# Patient Record
Sex: FEMALE | Race: WHITE | NOT HISPANIC OR LATINO | Employment: FULL TIME | URBAN - METROPOLITAN AREA
[De-identification: names, ages, dates, MRNs, and addresses within clinical notes are randomized per-mention and may not be internally consistent; named-entity substitution may affect disease eponyms.]

---

## 2023-09-09 PROBLEM — R79.89 ABNORMAL TSH: Status: ACTIVE | Noted: 2023-09-09

## 2023-09-09 PROBLEM — Z91.199 NON-COMPLIANCE WITH TREATMENT: Status: ACTIVE | Noted: 2023-09-09

## 2023-09-09 PROBLEM — I44.2 COMPLETE AV BLOCK (MULTI): Status: ACTIVE | Noted: 2023-09-09

## 2023-09-09 PROBLEM — H35.342 LAMELLAR MACULAR HOLE OF LEFT EYE: Status: ACTIVE | Noted: 2023-09-09

## 2023-09-09 PROBLEM — Q14.1 CONGENITAL HYPERTROPHY OF RETINAL PIGMENT EPITHELIUM: Status: ACTIVE | Noted: 2023-09-09

## 2023-09-09 PROBLEM — M79.89 SWELLING OF BOTH LOWER EXTREMITIES: Status: ACTIVE | Noted: 2023-09-09

## 2023-09-09 PROBLEM — I83.90 VARICOSE VEIN OF LEG: Status: ACTIVE | Noted: 2023-09-09

## 2023-09-09 PROBLEM — E66.812 CLASS 2 OBESITY WITH BODY MASS INDEX (BMI) OF 38.0 TO 38.9 IN ADULT: Status: ACTIVE | Noted: 2023-09-09

## 2023-09-09 PROBLEM — E66.9 CLASS 2 OBESITY WITH BODY MASS INDEX (BMI) OF 38.0 TO 38.9 IN ADULT: Status: ACTIVE | Noted: 2023-09-09

## 2023-09-09 PROBLEM — I50.30 DIASTOLIC HEART FAILURE, STAGE C (MULTI): Status: ACTIVE | Noted: 2023-09-09

## 2023-09-09 PROBLEM — R53.83 FATIGUE: Status: ACTIVE | Noted: 2023-09-09

## 2023-09-09 PROBLEM — H25.813 COMBINED FORMS OF AGE-RELATED CATARACT OF BOTH EYES: Status: ACTIVE | Noted: 2023-09-09

## 2023-09-09 PROBLEM — Z95.0 PACEMAKER: Status: ACTIVE | Noted: 2023-09-09

## 2023-09-09 PROBLEM — E78.5 HLD (HYPERLIPIDEMIA): Status: ACTIVE | Noted: 2023-09-09

## 2023-09-09 PROBLEM — I10 HTN (HYPERTENSION), BENIGN: Status: ACTIVE | Noted: 2023-09-09

## 2023-09-09 PROBLEM — H35.61 RETINAL HEMORRHAGE NOTED ON EXAMINATION OF RIGHT EYE: Status: ACTIVE | Noted: 2023-09-09

## 2023-09-09 PROBLEM — D64.9 ANEMIA: Status: ACTIVE | Noted: 2023-09-09

## 2023-09-09 PROBLEM — I42.2 HYPERTROPHIC CARDIOMYOPATHY (MULTI): Status: ACTIVE | Noted: 2023-09-09

## 2023-09-09 RX ORDER — LYSINE HCL 500 MG
1 TABLET ORAL DAILY
COMMUNITY

## 2023-09-09 RX ORDER — FUROSEMIDE 20 MG/1
1 TABLET ORAL DAILY
COMMUNITY

## 2023-09-09 RX ORDER — SIMVASTATIN 20 MG/1
1 TABLET, FILM COATED ORAL DAILY
COMMUNITY
Start: 2022-10-06

## 2023-09-09 RX ORDER — CLONIDINE HYDROCHLORIDE 0.1 MG/1
0.1 TABLET ORAL 3 TIMES DAILY PRN
COMMUNITY
Start: 2022-09-03 | End: 2024-02-20 | Stop reason: ALTCHOICE

## 2023-09-09 RX ORDER — CARVEDILOL 25 MG/1
2 TABLET ORAL 2 TIMES DAILY
COMMUNITY

## 2023-09-09 RX ORDER — LISINOPRIL 20 MG/1
10 TABLET ORAL 2 TIMES DAILY
COMMUNITY
Start: 2022-09-07 | End: 2024-04-18 | Stop reason: ALTCHOICE

## 2023-10-04 RX ORDER — AMLODIPINE BESYLATE 5 MG/1
5 TABLET ORAL DAILY
COMMUNITY

## 2023-10-10 ENCOUNTER — PREP FOR PROCEDURE (OUTPATIENT)
Dept: OPHTHALMOLOGY | Facility: CLINIC | Age: 68
End: 2023-10-10
Payer: COMMERCIAL

## 2023-10-10 RX ORDER — CYCLOPENTOLATE HYDROCHLORIDE 10 MG/ML
1 SOLUTION/ DROPS OPHTHALMIC
Status: CANCELLED | OUTPATIENT
Start: 2023-10-10 | End: 2023-10-10

## 2023-10-10 RX ORDER — PHENYLEPHRINE HYDROCHLORIDE 25 MG/ML
1 SOLUTION/ DROPS OPHTHALMIC
Status: CANCELLED | OUTPATIENT
Start: 2023-10-10 | End: 2023-10-10

## 2023-10-10 RX ORDER — MOXIFLOXACIN 5 MG/ML
1 SOLUTION/ DROPS OPHTHALMIC ONCE
Status: CANCELLED | OUTPATIENT
Start: 2023-10-10 | End: 2023-10-10

## 2023-10-10 RX ORDER — TROPICAMIDE 10 MG/ML
1 SOLUTION/ DROPS OPHTHALMIC
Status: CANCELLED | OUTPATIENT
Start: 2023-10-10 | End: 2023-10-10

## 2023-10-10 RX ORDER — TETRACAINE HYDROCHLORIDE 5 MG/ML
1 SOLUTION OPHTHALMIC ONCE
Status: CANCELLED | OUTPATIENT
Start: 2023-10-10 | End: 2023-10-10

## 2023-10-11 ENCOUNTER — ANESTHESIA EVENT (OUTPATIENT)
Dept: OPERATING ROOM | Facility: CLINIC | Age: 68
End: 2023-10-11
Payer: COMMERCIAL

## 2023-10-11 ENCOUNTER — HOSPITAL ENCOUNTER (OUTPATIENT)
Facility: CLINIC | Age: 68
Setting detail: OUTPATIENT SURGERY
Discharge: HOME | End: 2023-10-11
Attending: OPHTHALMOLOGY | Admitting: OPHTHALMOLOGY
Payer: COMMERCIAL

## 2023-10-11 ENCOUNTER — ANESTHESIA (OUTPATIENT)
Dept: OPERATING ROOM | Facility: CLINIC | Age: 68
End: 2023-10-11
Payer: COMMERCIAL

## 2023-10-11 VITALS
TEMPERATURE: 97.9 F | SYSTOLIC BLOOD PRESSURE: 156 MMHG | RESPIRATION RATE: 16 BRPM | DIASTOLIC BLOOD PRESSURE: 71 MMHG | BODY MASS INDEX: 40.55 KG/M2 | WEIGHT: 243.39 LBS | HEART RATE: 60 BPM | HEIGHT: 65 IN | OXYGEN SATURATION: 98 %

## 2023-10-11 DIAGNOSIS — Z98.41 STATUS POST RIGHT CATARACT EXTRACTION: Primary | ICD-10-CM

## 2023-10-11 PROCEDURE — 2500000005 HC RX 250 GENERAL PHARMACY W/O HCPCS: Performed by: OPHTHALMOLOGY

## 2023-10-11 PROCEDURE — 3700000001 HC GENERAL ANESTHESIA TIME - INITIAL BASE CHARGE: Performed by: OPHTHALMOLOGY

## 2023-10-11 PROCEDURE — 3700000002 HC GENERAL ANESTHESIA TIME - EACH INCREMENTAL 1 MINUTE: Performed by: OPHTHALMOLOGY

## 2023-10-11 PROCEDURE — 2500000004 HC RX 250 GENERAL PHARMACY W/ HCPCS (ALT 636 FOR OP/ED)

## 2023-10-11 PROCEDURE — A66984 PR REMV CATARACT EXTRACAP,INSERT LENS: Performed by: ANESTHESIOLOGY

## 2023-10-11 PROCEDURE — 66984 XCAPSL CTRC RMVL W/O ECP: CPT | Performed by: OPHTHALMOLOGY

## 2023-10-11 PROCEDURE — 3600000008 HC OR TIME - EACH INCREMENTAL 1 MINUTE - PROCEDURE LEVEL THREE: Performed by: OPHTHALMOLOGY

## 2023-10-11 PROCEDURE — 2500000001 HC RX 250 WO HCPCS SELF ADMINISTERED DRUGS (ALT 637 FOR MEDICARE OP): Performed by: OPHTHALMOLOGY

## 2023-10-11 PROCEDURE — 7100000009 HC PHASE TWO TIME - INITIAL BASE CHARGE: Performed by: OPHTHALMOLOGY

## 2023-10-11 PROCEDURE — 2500000001 HC RX 250 WO HCPCS SELF ADMINISTERED DRUGS (ALT 637 FOR MEDICARE OP)

## 2023-10-11 PROCEDURE — 2500000004 HC RX 250 GENERAL PHARMACY W/ HCPCS (ALT 636 FOR OP/ED): Performed by: OPHTHALMOLOGY

## 2023-10-11 PROCEDURE — 2500000001 HC RX 250 WO HCPCS SELF ADMINISTERED DRUGS (ALT 637 FOR MEDICARE OP): Performed by: STUDENT IN AN ORGANIZED HEALTH CARE EDUCATION/TRAINING PROGRAM

## 2023-10-11 PROCEDURE — A66984 PR REMV CATARACT EXTRACAP,INSERT LENS

## 2023-10-11 PROCEDURE — 2760000001 HC OR 276 NO HCPCS: Performed by: OPHTHALMOLOGY

## 2023-10-11 PROCEDURE — 2720000007 HC OR 272 NO HCPCS: Performed by: OPHTHALMOLOGY

## 2023-10-11 PROCEDURE — 3600000003 HC OR TIME - INITIAL BASE CHARGE - PROCEDURE LEVEL THREE: Performed by: OPHTHALMOLOGY

## 2023-10-11 PROCEDURE — 2580000001 HC RX 258 IV SOLUTIONS

## 2023-10-11 PROCEDURE — 7100000010 HC PHASE TWO TIME - EACH INCREMENTAL 1 MINUTE: Performed by: OPHTHALMOLOGY

## 2023-10-11 RX ORDER — MOXIFLOXACIN 5 MG/ML
1 SOLUTION/ DROPS OPHTHALMIC ONCE
Status: COMPLETED | OUTPATIENT
Start: 2023-10-11 | End: 2023-10-11

## 2023-10-11 RX ORDER — TROPICAMIDE 10 MG/ML
1 SOLUTION/ DROPS OPHTHALMIC
Status: COMPLETED | OUTPATIENT
Start: 2023-10-11 | End: 2023-10-11

## 2023-10-11 RX ORDER — PREDNISOLONE ACETATE 10 MG/ML
1 SUSPENSION/ DROPS OPHTHALMIC 4 TIMES DAILY
Qty: 10 ML | Refills: 0 | Status: ON HOLD | OUTPATIENT
Start: 2023-10-11 | End: 2023-11-08 | Stop reason: SDUPTHER

## 2023-10-11 RX ORDER — TRIAMCINOLONE ACETONIDE 40 MG/ML
INJECTION, SUSPENSION INTRA-ARTICULAR; INTRAMUSCULAR AS NEEDED
Status: DISCONTINUED | OUTPATIENT
Start: 2023-10-11 | End: 2023-10-11 | Stop reason: HOSPADM

## 2023-10-11 RX ORDER — TETRACAINE HYDROCHLORIDE 5 MG/ML
1 SOLUTION OPHTHALMIC ONCE
Status: COMPLETED | OUTPATIENT
Start: 2023-10-11 | End: 2023-10-11

## 2023-10-11 RX ORDER — ACETAMINOPHEN 325 MG/1
TABLET ORAL AS NEEDED
Status: DISCONTINUED | OUTPATIENT
Start: 2023-10-11 | End: 2023-10-11

## 2023-10-11 RX ORDER — MOXIFLOXACIN 5 MG/ML
SOLUTION/ DROPS OPHTHALMIC AS NEEDED
Status: DISCONTINUED | OUTPATIENT
Start: 2023-10-11 | End: 2023-10-11 | Stop reason: HOSPADM

## 2023-10-11 RX ORDER — CYCLOPENTOLATE HYDROCHLORIDE 10 MG/ML
1 SOLUTION/ DROPS OPHTHALMIC
Status: COMPLETED | OUTPATIENT
Start: 2023-10-11 | End: 2023-10-11

## 2023-10-11 RX ORDER — MIDAZOLAM HYDROCHLORIDE 1 MG/ML
INJECTION, SOLUTION INTRAMUSCULAR; INTRAVENOUS AS NEEDED
Status: DISCONTINUED | OUTPATIENT
Start: 2023-10-11 | End: 2023-10-11

## 2023-10-11 RX ORDER — PHENYLEPHRINE HYDROCHLORIDE 25 MG/ML
1 SOLUTION/ DROPS OPHTHALMIC
Status: COMPLETED | OUTPATIENT
Start: 2023-10-11 | End: 2023-10-11

## 2023-10-11 RX ORDER — LIDOCAINE HYDROCHLORIDE 10 MG/ML
INJECTION INFILTRATION; PERINEURAL AS NEEDED
Status: DISCONTINUED | OUTPATIENT
Start: 2023-10-11 | End: 2023-10-11 | Stop reason: HOSPADM

## 2023-10-11 RX ADMIN — PHENYLEPHRINE HYDROCHLORIDE 1 DROP: 25 SOLUTION/ DROPS OPHTHALMIC at 13:10

## 2023-10-11 RX ADMIN — TROPICAMIDE 1 DROP: 10 SOLUTION/ DROPS OPHTHALMIC at 13:00

## 2023-10-11 RX ADMIN — PHENYLEPHRINE HYDROCHLORIDE 1 DROP: 25 SOLUTION/ DROPS OPHTHALMIC at 13:00

## 2023-10-11 RX ADMIN — TROPICAMIDE 1 DROP: 10 SOLUTION/ DROPS OPHTHALMIC at 13:05

## 2023-10-11 RX ADMIN — CYCLOPENTOLATE HYDROCHLORIDE 1 DROP: 10 SOLUTION/ DROPS OPHTHALMIC at 13:05

## 2023-10-11 RX ADMIN — MIDAZOLAM 2 MG: 1 INJECTION INTRAMUSCULAR; INTRAVENOUS at 13:54

## 2023-10-11 RX ADMIN — PHENYLEPHRINE HYDROCHLORIDE 1 DROP: 25 SOLUTION/ DROPS OPHTHALMIC at 13:05

## 2023-10-11 RX ADMIN — SODIUM CHLORIDE, SODIUM LACTATE, POTASSIUM CHLORIDE, AND CALCIUM CHLORIDE: .6; .31; .03; .02 INJECTION, SOLUTION INTRAVENOUS at 13:57

## 2023-10-11 RX ADMIN — TROPICAMIDE 1 DROP: 10 SOLUTION/ DROPS OPHTHALMIC at 13:10

## 2023-10-11 RX ADMIN — TETRACAINE HYDROCHLORIDE 1 DROP: 5 SOLUTION/ DROPS OPHTHALMIC at 13:00

## 2023-10-11 RX ADMIN — MOXIFLOXACIN 1 DROP: 5 SOLUTION/ DROPS OPHTHALMIC at 13:00

## 2023-10-11 RX ADMIN — ACETAMINOPHEN 650 MG: 325 TABLET ORAL at 13:48

## 2023-10-11 RX ADMIN — CYCLOPENTOLATE HYDROCHLORIDE 1 DROP: 10 SOLUTION/ DROPS OPHTHALMIC at 13:00

## 2023-10-11 RX ADMIN — CYCLOPENTOLATE HYDROCHLORIDE 1 DROP: 10 SOLUTION/ DROPS OPHTHALMIC at 13:10

## 2023-10-11 ASSESSMENT — PAIN - FUNCTIONAL ASSESSMENT
PAIN_FUNCTIONAL_ASSESSMENT: 0-10

## 2023-10-11 ASSESSMENT — PAIN SCALES - GENERAL
PAINLEVEL_OUTOF10: 0 - NO PAIN
PAIN_LEVEL: 0

## 2023-10-11 NOTE — BRIEF OP NOTE
Date: 10/11/2023  OR Location: [facility] OR    Name: Jacquelyn Buenrostro, : 1955, Age: 67 y.o., MRN: [id number], Sex: female    Diagnosis  Pre-op Diagnosis     * Combined forms of age-related cataract, right eye [H25.811] Post-op Diagnosis     * Combined forms of age-related cataract, right eye [H25.811]     Procedures  PHACO OD  58884 - MT XCAPSL CTRC RMVL INSJ IO LENS PROSTH W/O ECP      Surgeons      * Haily Amaro - Primary    Resident/Fellow/Other Assistant:  No surgical staff documented.    Procedure Summary  Anesthesia: Monitor Anesthesia Care  ASA: III  Anesthesia Staff: Anesthesiologist: Wilfredo Kwon MD  C-AA: ANGELA Youngblood  Estimated Blood Loss: 0mL  Intra-op Medications:   Medication Name Total Dose   balanced salts (BSS) intraocular solution 500 mL   lidocaine (Xylocaine) 10 mg/mL (1 %) injection 1 mL   moxifloxacin (Vigamox) 0.5 % ophthalmic solution 1 drop   chondroitin sulf-sod hyaluron (Viscoat) intraocular injection 1 mL   triamcinolone acetonide (Kenalog-40) injection 5 mg              Anesthesia Record               Intraprocedure I/O Totals          Intake    .00 mL    Total Intake 200 mL          Specimen: No specimens collected     Staff:   Circulator: Belkis Mantilla RN; Kimberlee Gaspar RN  Scrub Person: Emerson Cordova          Findings: Cataract right eye (OD)     Complications:  None; patient tolerated the procedure well.     Disposition: PACU - hemodynamically stable.  Condition: stable  Specimens Collected: No specimens collected  Attending Attestation: I performed the procedure.    Haily Amaro  Phone Number: [phone]

## 2023-10-11 NOTE — OP NOTE
PHACO OD (R) Operative Note     Date: 10/11/2023  OR Location: [facility] OR    Name: Jacquelyn Buenrostro, : 1955, Age: 67 y.o., MRN: [id number], Sex: female    Diagnosis  Pre-op Diagnosis     * Combined forms of age-related cataract, right eye [H25.811] Post-op Diagnosis     * Combined forms of age-related cataract, right eye [H25.811]     Procedures  PHACO OD  72413 - [place] XCAPSL CTRC RMVL INSJ IO LENS PROSTH W/O ECP      Surgeons      * Haily Amaro - Primary    Resident/Fellow/Other Assistant:  No surgical staff documented.    Procedure Summary  Anesthesia: Monitor Anesthesia Care  ASA: III  Anesthesia Staff: Anesthesiologist: Wilfredo Kwon MD  C-AA: ANGELA Youngblood  Estimated Blood Loss: 0mL  Intra-op Medications:   Medication Name Total Dose   balanced salts (BSS) intraocular solution 500 mL   lidocaine (Xylocaine) 10 mg/mL (1 %) injection 1 mL   moxifloxacin (Vigamox) 0.5 % ophthalmic solution 1 drop   chondroitin sulf-sod hyaluron (Viscoat) intraocular injection 1 mL   triamcinolone acetonide (Kenalog-40) injection 5 mg              Anesthesia Record               Intraprocedure I/O Totals          Intake    .00 mL    Total Intake 200 mL          Specimen: No specimens collected     Staff:   Circulator: Belkis Mantilla RN; Kimberlee Gaspar RN  Scrub Person: Emerson Corodva         Drains and/or Catheters: * None in log *    Tourniquet Times:         Implants:  Implants       Type Name Action Serial No.       17.5 DIOPTER, TECNIS EYHANCE 1-PIECE IOL W/ SIMPLICITY DELIVERY SYSTEM, BICONVEX, UV-BLOCKING HYDROPHOBIC ACRYLIC, MODEL DIB00 Implanted [id number]              Findings: Cataract OD    Indications: Jacquelyn Buenrostro is an 67 y.o. female who is having surgery for Combined forms of age-related cataract, right eye [H25.811].     The patient was seen in the preoperative area. The risks, benefits, complications, treatment options, non-operative alternatives, expected recovery and outcomes were  discussed with the patient. The possibilities of reaction to medication, pulmonary aspiration, injury to surrounding structures, bleeding, recurrent infection, the need for additional procedures, failure to diagnose a condition, and creating a complication requiring transfusion or operation were discussed with the patient. The patient concurred with the proposed plan, giving informed consent.  The site of surgery was properly noted/marked if necessary per policy. The patient has been actively warmed in preoperative area. Preoperative antibiotics have been ordered and given within 1 hours of incision. Venous thrombosis prophylaxis are not indicated.    Procedure Details: The patient was placed in the supine position on the operating room table where appropriate blood pressure and cardiac monitoring were initiated. The patient was prepped and draped in the usual sterile fashion for intraocular surgery. This included instillation of Betadine 5% onto the ocular surface followed by irrigation with balance salt solution a minute or two later. A lid speculum was placed and the operating microscope was positioned. One paracentesis stab incision was made to the left of the planned cataract incision with a 15-degree supersharp blade. 1 ml of preservative free lidocaine was injected into the AC. Viscoat was used to replace the aqueous humor. A temporal clear corneal wound was fashioned beginning at the limbus with a 2.2 mm keratome, extending 2 mm into clear cornea before entering the anterior chamber. A continuous tear circular capsulorhexis of approximately 5 mm in diameter was performed. Hydrodissection was performed using a Cross canula. The endothelium was coated with viscoat again. Using the Ozil handpiece on the [employer] O[employer]ia Lens Removal System, the nucleus was emulsified and aspirated using a divide-and-conquer technique. Residual cortex was removed from the eye with the irrigation/aspiration bimanually. ProVisc was used  to inflate the capsular bag. The lens implant was inspected and found to be free of visible defects. The lens used was model DCB00, power 17.5 diopter intraocular lens. The lens was inserted into the capsular bag. The lens was centered with a Rogel hook. Residual viscoelastic was removed from the eye with the irrigation/aspiration instrument. Preservative free moxifloxacin was injected  intracameral. The wounds were checked and found to be watertight. 0.3ml of Diluted (1:3) triamcinolone acetonide was injected subonj inferiorly. The lid speculum was removed and the eye was dressed with Maxitrol ointment, eye pad, tape, and shield. The patient tolerated the procedure well, and there were no complications.   Complications:  None; patient tolerated the procedure well.    Disposition: PACU - hemodynamically stable.  Condition: stable         Additional Details: None    Attending Attestation:     Haily Amaro  Phone Number: [phone]

## 2023-10-11 NOTE — DISCHARGE INSTRUCTIONS
Start the following eye drops in the operative eye:     Prednisolone acetate (pink or white cap) - 4 times a day       No heavy lifting over 10-15 lbs, no bending over, do not get eye wet, no eye rubbing. Wear eye shield until your next appointment; only remove temporarily to apply drops. Please call immediately if you develop any redness, pain, decreased vision, flashes, floaters.   Office phone (after hours): [phone]       Hospital : [phone] (call this number if unable to reach someone on the phone above) then ask for ophthalmologist on call.     Patient to resume home medications.

## 2023-10-11 NOTE — H&P
History Of Present Illness  Jacquelyn Buenrostro is a 67 y.o. female presenting with visually significant cataract of the right eye.     Past Medical History  Past Medical History:   Diagnosis Date    Arthritis     Encounter for follow-up examination after completed treatment for conditions other than malignant neoplasm 09/07/2022    Hospital discharge follow-up    Hyperlipidemia     Hypertension     Morbid (severe) obesity due to excess calories (CMS/[facility]) 08/30/2022    Morbid obesity with BMI of 40.0-44.9, adult    Other conditions influencing health status 08/30/2022    History of cough    Pacemaker     Personal history of other specified conditions 08/30/2022    History of paroxysmal nocturnal dyspnea    Unspecified open wound, unspecified ankle, initial encounter 08/30/2022    Wound of ankle       Surgical History  Past Surgical History:   Procedure Laterality Date    CARDIAC CATHETERIZATION      INSERT / REPLACE / REMOVE PACEMAKER      OTHER SURGICAL HISTORY  09/07/2022    Pacemaker insertion        Social History  She reports that she has never smoked. She has never been exposed to tobacco smoke. She has never used smokeless tobacco. She reports that she does not drink alcohol and does not use drugs.    Family History  Family History   Problem Relation Name Age of Onset    Cancer Mother      Cancer Other Multiple Family Member         Allergies  Aspirin    Review of Systems   All other systems reviewed and are negative.       Physical Exam  Vitals reviewed.   Constitutional:       Appearance: Normal appearance.   HENT:      Head: Normocephalic and atraumatic.      Mouth/Throat:      Pharynx: Oropharynx is clear.   Eyes:      Conjunctiva/sclera: Conjunctivae normal.   Abdominal:      General: Abdomen is flat.   Musculoskeletal:         General: Normal range of motion.      Cervical back: Normal range of motion.   Skin:     General: Skin is warm.   Neurological:      General: No focal deficit present.      Mental  "Status: She is alert and oriented to person, place, and time.   Psychiatric:         Mood and Affect: Mood normal.         Behavior: Behavior normal.          Last Recorded Vitals  Blood pressure 178/90, pulse 86, temperature 36 °C (96.8 °F), temperature source Temporal, resp. rate 16, height 1.651 m (5' 5\"), weight 110 kg (243 lb 6.2 oz), SpO2 97 %.    Relevant Results             Assessment/Plan   Active Problems:  There are no active Hospital Problems.      #Visually significant cataract of the right eye   -Plan for cataract extraction and intraocular lens (IOL) implantation of right eye             Dennis Ahmadi MD    "

## 2023-10-11 NOTE — ANESTHESIA PREPROCEDURE EVALUATION
Patient: Jacquelyn Buenrostro    Procedure Information       Anesthesia Start Date/Time: 10/11/23 1349    Procedure: PHACO OD (Right: Eye)    Location: [facility] 02 / [facility]    Surgeons: Haily Amaro MD            Relevant Problems   Cardiovascular   (+) Complete AV block (CMS/HCC)   (+) Diastolic heart failure, stage C (CMS/HCC)   (+) HLD (hyperlipidemia)   (+) HTN (hypertension), benign   (+) Pacemaker      Endocrine   (+) Class 2 obesity with body mass index (BMI) of 38.0 to 38.9 in adult      Hematology   (+) Anemia       Clinical information reviewed:   Tobacco  Allergies  Meds   Med Hx  Surg Hx  OB Status  Fam Hx  Soc   Hx        NPO Detail:  NPO/Void Status  Date of Last Liquid: 10/10/23  Time of Last Liquid: 2100  Date of Last Solid: 10/10/23  Time of Last Solid: 2100  Time of Last Void: 1100         Physical Exam    Airway  Mallampati: III     Cardiovascular   Rhythm: regular  Comments: Hx of pacemaker, AV block   Dental    Pulmonary - normal exam     Abdominal        Anesthesia Plan    ASA 3     MAC     Anesthetic plan and risks discussed with patient.

## 2023-10-11 NOTE — ANESTHESIA POSTPROCEDURE EVALUATION
Patient: Jacquelyn Buenrostro    Procedure Summary       Date: 10/11/23 Room / Location: [facility] OR 02 / [facility]    Anesthesia Start: 1349 Anesthesia Stop: 1417    Procedure: PHACO OD (Right: Eye) Diagnosis:       Combined forms of age-related cataract, right eye      (Combined forms of age-related cataract, right eye [H25.811])    Surgeons: Haily Amaro MD Responsible Provider: Wilfredo Kwno MD    Anesthesia Type: MAC ASA Status: 3            Anesthesia Type: MAC    Vitals Value Taken Time   /72 10/11/23 1417   Temp 36.6 °C (97.9 °F) 10/11/23 1417   Pulse 60 10/11/23 1417   Resp 16 10/11/23 1417   SpO2 97 % 10/11/23 1417       Anesthesia Post Evaluation    Patient location during evaluation: bedside  Patient participation: complete - patient participated  Level of consciousness: awake and alert  Pain score: 0  Pain management: adequate  Airway patency: patent  Cardiovascular status: acceptable  Respiratory status: acceptable  Hydration status: acceptable    No notable events documented.

## 2023-10-12 ENCOUNTER — OFFICE VISIT (OUTPATIENT)
Dept: OPHTHALMOLOGY | Facility: CLINIC | Age: 68
End: 2023-10-12
Payer: COMMERCIAL

## 2023-10-12 DIAGNOSIS — Z96.1 PSEUDOPHAKIA, RIGHT EYE: Primary | ICD-10-CM

## 2023-10-12 PROCEDURE — 99024 POSTOP FOLLOW-UP VISIT: CPT | Performed by: OPHTHALMOLOGY

## 2023-10-12 ASSESSMENT — ENCOUNTER SYMPTOMS
ALLERGIC/IMMUNOLOGIC NEGATIVE: 0
CARDIOVASCULAR NEGATIVE: 0
PSYCHIATRIC NEGATIVE: 0
RESPIRATORY NEGATIVE: 0
HEMATOLOGIC/LYMPHATIC NEGATIVE: 0
ENDOCRINE NEGATIVE: 0
NEUROLOGICAL NEGATIVE: 0
GASTROINTESTINAL NEGATIVE: 0
EYES NEGATIVE: 1
MUSCULOSKELETAL NEGATIVE: 0
CONSTITUTIONAL NEGATIVE: 0

## 2023-10-12 ASSESSMENT — PAIN SCALES - GENERAL: PAINLEVEL_OUTOF10: 0 - NO PAIN

## 2023-10-12 ASSESSMENT — EXTERNAL EXAM - LEFT EYE: OS_EXAM: NORMAL

## 2023-10-12 ASSESSMENT — TONOMETRY
OD_IOP_MMHG: 21
IOP_METHOD: GOLDMANN APPLANATION

## 2023-10-12 ASSESSMENT — EXTERNAL EXAM - RIGHT EYE: OD_EXAM: NORMAL

## 2023-10-12 ASSESSMENT — SLIT LAMP EXAM - LIDS
COMMENTS: NORMAL
COMMENTS: NORMAL

## 2023-10-12 ASSESSMENT — VISUAL ACUITY
METHOD: SNELLEN - LINEAR
OD_SC: 20/30

## 2023-10-12 NOTE — PATIENT INSTRUCTIONS
Postop instructions   Prednisolone acetate drops:  4 times/day for 1 week  3 times/day for 1 week  2 times/day for 1 week  Once/day for 1 week      Sleep with shield at night for 7 days  No eye rubbing  May shower and wash her face but no water inside surgery eye  No lifting any weight above 10lbs

## 2023-10-13 ENCOUNTER — OFFICE VISIT (OUTPATIENT)
Dept: CARDIOLOGY | Facility: CLINIC | Age: 68
End: 2023-10-13
Payer: COMMERCIAL

## 2023-10-13 VITALS
BODY MASS INDEX: 40.32 KG/M2 | HEIGHT: 65 IN | HEART RATE: 85 BPM | DIASTOLIC BLOOD PRESSURE: 78 MMHG | WEIGHT: 242 LBS | SYSTOLIC BLOOD PRESSURE: 123 MMHG

## 2023-10-13 DIAGNOSIS — I50.30 DIASTOLIC HEART FAILURE, STAGE C (MULTI): Primary | ICD-10-CM

## 2023-10-13 PROCEDURE — 99213 OFFICE O/P EST LOW 20 MIN: CPT | Performed by: NURSE PRACTITIONER

## 2023-10-13 PROCEDURE — 3078F DIAST BP <80 MM HG: CPT | Performed by: NURSE PRACTITIONER

## 2023-10-13 PROCEDURE — 3074F SYST BP LT 130 MM HG: CPT | Performed by: NURSE PRACTITIONER

## 2023-10-13 PROCEDURE — 1036F TOBACCO NON-USER: CPT | Performed by: NURSE PRACTITIONER

## 2023-10-13 PROCEDURE — 1160F RVW MEDS BY RX/DR IN RCRD: CPT | Performed by: NURSE PRACTITIONER

## 2023-10-13 PROCEDURE — 1126F AMNT PAIN NOTED NONE PRSNT: CPT | Performed by: NURSE PRACTITIONER

## 2023-10-13 PROCEDURE — 1159F MED LIST DOCD IN RCRD: CPT | Performed by: NURSE PRACTITIONER

## 2023-10-13 ASSESSMENT — ENCOUNTER SYMPTOMS
DIZZINESS: 0
OCCASIONAL FEELINGS OF UNSTEADINESS: 0
SYNCOPE: 0
DECREASED APPETITE: 0
VOMITING: 0
WEIGHT GAIN: 0
LOSS OF SENSATION IN FEET: 0
DYSPNEA ON EXERTION: 0
PALPITATIONS: 0
IRREGULAR HEARTBEAT: 0
NEAR-SYNCOPE: 0
WEIGHT LOSS: 0
NAUSEA: 0
LIGHT-HEADEDNESS: 0
DIARRHEA: 0
PND: 0
SHORTNESS OF BREATH: 0
SLEEP DISTURBANCES DUE TO BREATHING: 0
DEPRESSION: 1
ORTHOPNEA: 0

## 2023-10-13 NOTE — PROGRESS NOTES
"Chief Complaint:  66yo patient of Dr. Geiger here for 3 month follow up for heart failure.     HPI  Most recent follow up 23, referred for genetic counseling and PET scan. Patient declined during visit. Start Amlodipine 5mg every day.     Interval Hx: FUV with Dr. Mccann, no changes. Recommended PET scan. Recent right eye cataract surgery.     PMHx: HTN, HFpEF, High Grade AV Block s/p PPM (22)  PSHx: Denies.   Social Hx: Never smoker. Denies EtOH/illicit drug use.   Family Hx: Mother- CHF,  @ 92.     Denies chest pain. Denies palpitations. Denies SOB on exertion. Able to climb 14 stairs w/o difficulty. Denies orthopnea/PND. Denies lightheadedness, dizziness, and syncope. Denies edema. Medication adherent. Eating/drinking well. Denies N/V/D. Home BP monitoring 140-150/80-90s. HR 80s. Only takes Clonidine once daily.     Review of Systems   Constitutional: Negative for decreased appetite, weight gain and weight loss.   Cardiovascular:  Negative for chest pain, dyspnea on exertion, irregular heartbeat, leg swelling, near-syncope, orthopnea, palpitations, paroxysmal nocturnal dyspnea and syncope.   Respiratory:  Negative for shortness of breath and sleep disturbances due to breathing.    Gastrointestinal:  Negative for diarrhea, nausea and vomiting.   Neurological:  Negative for dizziness and light-headedness.     Visit Vitals  /78 (BP Location: Right arm, Patient Position: Sitting)   Pulse 85   Ht 1.651 m (5' 5\")   Wt 110 kg (242 lb)   BMI 40.27 kg/m²   OB Status Postmenopausal   Smoking Status Never   BSA 2.25 m²     Objective   Vitals reviewed.   Constitutional:       Appearance: Healthy appearance.   Neck:      Vascular: No hepatojugular reflux or JVD. JVD normal.   Pulmonary:      Effort: Pulmonary effort is normal.      Breath sounds: Normal breath sounds.   Cardiovascular:      Normal rate. Regular rhythm.      Murmurs: There is no murmur.      No gallop.  No click. No rub.      Comments: " Bilateral LE w/varicosities   Edema:     Peripheral edema absent.   Abdominal:      General: There is no distension.   Skin:     General: Skin is warm and dry.   Neurological:      Mental Status: Alert and oriented to person, place and time.       Lab Review:   Lab Results   Component Value Date     01/10/2023    K 4.1 01/10/2023     01/10/2023    CO2 25 01/10/2023    BUN 24 (H) 01/10/2023    CREATININE 1.14 (H) 01/10/2023    GLUCOSE 145 (H) 01/10/2023    CALCIUM 9.2 01/10/2023       Current Outpatient Medications:     amLODIPine (Norvasc) 5 mg tablet, Take 1 tablet (5 mg) by mouth once daily., Disp: , Rfl:     carvedilol (Coreg) 25 mg tablet, Take 2 tablets (50 mg) by mouth 2 times a day., Disp: , Rfl:     cloNIDine (Catapres) 0.1 mg tablet, Take 1 tablet (0.1 mg) by mouth 3 times a day as needed (systolic blood pressure greater than 170)., Disp: , Rfl:     furosemide (Lasix) 20 mg tablet, Take 1 tablet (20 mg) by mouth once daily., Disp: , Rfl:     lisinopril 20 mg tablet, Take 0.5 tablets (10 mg) by mouth 2 times a day., Disp: , Rfl:     multivitamin-min-FA-ginkgo (One Daily Women 50 Plus) 400-120 mcg-mg tablet, Take 1 tablet by mouth once daily., Disp: , Rfl:     prednisoLONE acetate (Pred-Forte) 1 % ophthalmic suspension, Administer 1 drop into the right eye 4 times a day., Disp: 10 mL, Rfl: 0    simvastatin (Zocor) 20 mg tablet, Take 1 tablet (20 mg) by mouth once daily., Disp: , Rfl:     vit C/E/zinc/lutein/zeaxanthin (OCUVITE EYE HEALTH ORAL), Take 1 tablet by mouth once daily., Disp: , Rfl:     vitamins A,C,E-zinc-copper 4,296 mcg-226 mg-90 mg capsule, Take 1 capsule by mouth once daily., Disp: , Rfl:     Assessment/Plan   Chronic Diastolic Heart Failure  Asymptomatic from a cardiovascular standpoint. Appears euvolemic and normotensive on exam. Most recent TTE 6/22/23 LVEF 65-70% w/impaired relaxation pattern of LV diastolic filling. Mild MR. cMRI 2/14/23 LVEF 43% w/diffuse hyperenhancement of  basal anterior septum. Focal confluent hyperenhancement of the mid anterior ventricular junction 1.3cm. Findings c/w HCM. Asymmetric septal LVH (1.9cm). BNP 8/30/22 502. NYHA Class I Stage C HFpEF. Continue Furosemide 20mg QD. Agreeable to referral for PET scan to evaluated for sarcoidosis.     HTN  Well controlled today. Continue Lisinopril 10mg BID, Amlodipine 5mg every day, Carvedilol 50mg BID, and Clonidine 0.1mg TID prn.

## 2023-10-13 NOTE — PATIENT INSTRUCTIONS
Call [phone] to schedule PET scan. We will call you with any abnormal results.   Continue all current medications as prescribed.   Call [phone] to schedule 3 month follow up with Dr. Geiger.

## 2023-10-19 ENCOUNTER — OFFICE VISIT (OUTPATIENT)
Dept: OPHTHALMOLOGY | Facility: CLINIC | Age: 68
End: 2023-10-19
Payer: COMMERCIAL

## 2023-10-19 DIAGNOSIS — H25.813 COMBINED FORMS OF AGE-RELATED CATARACT OF BOTH EYES: Primary | ICD-10-CM

## 2023-10-19 PROCEDURE — 99024 POSTOP FOLLOW-UP VISIT: CPT | Performed by: OPHTHALMOLOGY

## 2023-10-19 ASSESSMENT — TONOMETRY
OD_IOP_MMHG: 18
IOP_METHOD: GOLDMANN APPLANATION

## 2023-10-19 ASSESSMENT — ENCOUNTER SYMPTOMS
PSYCHIATRIC NEGATIVE: 0
HEMATOLOGIC/LYMPHATIC NEGATIVE: 0
CONSTITUTIONAL NEGATIVE: 0
MUSCULOSKELETAL NEGATIVE: 0
NEUROLOGICAL NEGATIVE: 0
RESPIRATORY NEGATIVE: 0
EYES NEGATIVE: 0
GASTROINTESTINAL NEGATIVE: 0
CARDIOVASCULAR NEGATIVE: 0
ALLERGIC/IMMUNOLOGIC NEGATIVE: 0
ENDOCRINE NEGATIVE: 0

## 2023-10-19 ASSESSMENT — EXTERNAL EXAM - LEFT EYE: OS_EXAM: NORMAL

## 2023-10-19 ASSESSMENT — VISUAL ACUITY
OD_SC: 20/20
METHOD: SNELLEN - LINEAR

## 2023-10-19 ASSESSMENT — SLIT LAMP EXAM - LIDS
COMMENTS: NORMAL
COMMENTS: NORMAL

## 2023-10-19 ASSESSMENT — EXTERNAL EXAM - RIGHT EYE: OD_EXAM: NORMAL

## 2023-10-19 NOTE — PROGRESS NOTES
POW 1 s/p phaco OD  PO gtts taper as instructed  ER precautions   PO instructions  F/u 1 month for autoref and mrx

## 2023-10-24 ENCOUNTER — CLINICAL SUPPORT (OUTPATIENT)
Dept: CARDIOLOGY | Facility: CLINIC | Age: 68
End: 2023-10-24
Payer: COMMERCIAL

## 2023-10-24 ENCOUNTER — OFFICE VISIT (OUTPATIENT)
Dept: CARDIOLOGY | Facility: CLINIC | Age: 68
End: 2023-10-24
Payer: COMMERCIAL

## 2023-10-24 VITALS
HEIGHT: 65 IN | SYSTOLIC BLOOD PRESSURE: 140 MMHG | DIASTOLIC BLOOD PRESSURE: 82 MMHG | WEIGHT: 241 LBS | HEART RATE: 71 BPM | BODY MASS INDEX: 40.15 KG/M2

## 2023-10-24 DIAGNOSIS — Z95.0 PACEMAKER: ICD-10-CM

## 2023-10-24 DIAGNOSIS — I44.2 ATRIOVENTRICULAR BLOCK, COMPLETE (MULTI): ICD-10-CM

## 2023-10-24 DIAGNOSIS — Z95.0 CARDIAC PACEMAKER IN SITU: ICD-10-CM

## 2023-10-24 DIAGNOSIS — I44.2 COMPLETE AV BLOCK (MULTI): Primary | ICD-10-CM

## 2023-10-24 PROCEDURE — 3077F SYST BP >= 140 MM HG: CPT | Performed by: INTERNAL MEDICINE

## 2023-10-24 PROCEDURE — 93280 PM DEVICE PROGR EVAL DUAL: CPT | Performed by: INTERNAL MEDICINE

## 2023-10-24 PROCEDURE — 99214 OFFICE O/P EST MOD 30 MIN: CPT | Performed by: INTERNAL MEDICINE

## 2023-10-24 PROCEDURE — 1126F AMNT PAIN NOTED NONE PRSNT: CPT | Performed by: INTERNAL MEDICINE

## 2023-10-24 PROCEDURE — 1160F RVW MEDS BY RX/DR IN RCRD: CPT | Performed by: INTERNAL MEDICINE

## 2023-10-24 PROCEDURE — 1036F TOBACCO NON-USER: CPT | Performed by: INTERNAL MEDICINE

## 2023-10-24 PROCEDURE — 1159F MED LIST DOCD IN RCRD: CPT | Performed by: INTERNAL MEDICINE

## 2023-10-24 PROCEDURE — 3079F DIAST BP 80-89 MM HG: CPT | Performed by: INTERNAL MEDICINE

## 2023-10-24 PROCEDURE — 93290 INTERROG DEV EVAL ICPMS IP: CPT | Performed by: INTERNAL MEDICINE

## 2023-10-24 ASSESSMENT — PATIENT HEALTH QUESTIONNAIRE - PHQ9
2. FEELING DOWN, DEPRESSED OR HOPELESS: NOT AT ALL
1. LITTLE INTEREST OR PLEASURE IN DOING THINGS: NOT AT ALL
SUM OF ALL RESPONSES TO PHQ9 QUESTIONS 1 AND 2: 0

## 2023-10-24 NOTE — PROGRESS NOTES
Subjective:  The patient is a 67-year-old white female who presents today for pacemaker follow-up.  She was found in August 2022 to have complete heart block of uncertain etiology.  She had normal LV function at that time.  She underwent Medtronic DDDR pacemaker placement on 9/1/2022, and has had normal pacemaker function since then.    Her left ventricular ejection fraction has been widely variable, as low as 35-37% but as high as 65%.  She underwent cardiac catheterization in January 2023, showing minimal coronary disease.  She is scheduled for a cardiac PET scan to look for evidence of cardiac sarcoidosis, I believe.    The patient is retired and lives at home with her  of 42 years.  She is being treated for hypertension with hypertensive heart disease and for chronic obesity.    Current Outpatient Medications   Medication Sig    amLODIPine (Norvasc) 5 mg tablet Take 1 tablet (5 mg) by mouth once daily.    carvedilol (Coreg) 25 mg tablet Take 2 tablets (50 mg) by mouth 2 times a day.    cloNIDine (Catapres) 0.1 mg tablet Take 1 tablet (0.1 mg) by mouth 3 times a day as needed (systolic blood pressure greater than 170).    furosemide (Lasix) 20 mg tablet Take 1 tablet (20 mg) by mouth once daily.    lisinopril 20 mg tablet Take 0.5 tablets (10 mg) by mouth 2 times a day.    multivitamin-min-FA-ginkgo (One Daily Women 50 Plus) 400-120 mcg-mg tablet Take 1 tablet by mouth once daily.    prednisoLONE acetate (Pred-Forte) 1 % ophthalmic suspension Administer 1 drop into the right eye 4 times a day.    simvastatin (Zocor) 20 mg tablet Take 1 tablet (20 mg) by mouth once daily.    vit C/E/zinc/lutein/zeaxanthin (OCUVITE EYE HEALTH ORAL) Take 1 tablet by mouth once daily.    vitamins A,C,E-zinc-copper 4,296 mcg-226 mg-90 mg capsule Take 1 capsule by mouth once daily.     Allergies:  Aspirin     Patient Active Problem List   Diagnosis    Abnormal TSH    Anemia    Complete AV block (CMS/HCC)    Fatigue    HLD  "(hyperlipidemia)    HTN (hypertension), benign    Non-compliance with treatment    Pacemaker    Varicose vein of leg    Swelling of both lower extremities    Class 2 obesity with body mass index (BMI) of 38.0 to 38.9 in adult    Combined forms of age-related cataract of both eyes    Congenital hypertrophy of retinal pigment epithelium    Diastolic heart failure, stage C (CMS/HCC)    Hypertrophic cardiomyopathy (CMS/HCC)    Lamellar macular hole of left eye    Retinal hemorrhage noted on examination of right eye     Past Surgical History:   Procedure Laterality Date    CARDIAC CATHETERIZATION      CATARACT EXTRACTION W/  INTRAOCULAR LENS IMPLANT Right 10/11/2023    Dr Amaro    INSERT / REPLACE / REMOVE PACEMAKER      OTHER SURGICAL HISTORY  09/07/2022    Pacemaker insertion     Objective:  Vitals:    10/24/23 1601   BP: 140/82   Pulse: 71   Height:     1.651 m (5' 5\")  Weight: 109 kg (241 lb)     Exam:  Gen: Pleasant woman in no distress, with a slightly unusual affect.  HEENT: No scleral icterus.  Neck: No jugular venous distention or thyromegaly.  Left subclavian pacemaker pocket: Normal in appearance.  Lungs: Clear to auscultation, with no wheezes or rales.  Heart: Regular rhythm without murmurs or gallops.  Abdomen: Benign, with no organomegaly or masses.  Extremities: Intact distal pulses; trace bilateral ankle edema.  Neuro: No focal neurologic abnormalities.  Skin: No cutaneous lesions.    Device Check:  A Medtronic pacemaker check showed normal device function, with 10.6 years of remaining battery longevity.  She has roughly 91% atrial pacing and 99% ventricular pacing.    Impressions:  1.  Longstanding hypertension, under fair control.  2.  Hypertensive heart disease with prior diastolic heart failure.  3.  Complete heart block, of uncertain etiology.  4.  Status post Medtronic DDDR pacemaker placement in September 2022, with normal device function.  5.  Intermittent left ventricular dysfunction, of uncertain " etiology.  Chronic right ventricular pacing may worsen a cardiomyopathy.  If this is ever felt to be the primary cause of left ventricular dysfunction, an upgrade to a biventricular pacing system would be reasonable.  The patient is currently undergoing further evaluation for possible cardiac sarcoidosis.  6.  Chronic obesity.    Recommendations:  1.  The patient's device will be followed remotely using her cell phone apryl every 3 months.  2.  She will see me on an annual basis for pacemaker checks.      Brian Quijano MD

## 2023-11-02 ENCOUNTER — APPOINTMENT (OUTPATIENT)
Dept: RADIOLOGY | Facility: CLINIC | Age: 68
End: 2023-11-02
Payer: COMMERCIAL

## 2023-11-07 ENCOUNTER — PREP FOR PROCEDURE (OUTPATIENT)
Dept: OPERATING ROOM | Facility: CLINIC | Age: 68
End: 2023-11-07
Payer: COMMERCIAL

## 2023-11-07 RX ORDER — MOXIFLOXACIN 5 MG/ML
1 SOLUTION/ DROPS OPHTHALMIC ONCE
Status: CANCELLED | OUTPATIENT
Start: 2023-11-07 | End: 2023-11-07

## 2023-11-07 RX ORDER — PHENYLEPHRINE HYDROCHLORIDE 25 MG/ML
1 SOLUTION/ DROPS OPHTHALMIC
Status: CANCELLED | OUTPATIENT
Start: 2023-11-07 | End: 2023-11-07

## 2023-11-07 RX ORDER — TETRACAINE HYDROCHLORIDE 5 MG/ML
1 SOLUTION OPHTHALMIC ONCE
Status: CANCELLED | OUTPATIENT
Start: 2023-11-07 | End: 2023-11-07

## 2023-11-07 RX ORDER — CYCLOPENTOLATE HYDROCHLORIDE 10 MG/ML
1 SOLUTION/ DROPS OPHTHALMIC
Status: CANCELLED | OUTPATIENT
Start: 2023-11-07 | End: 2023-11-07

## 2023-11-07 RX ORDER — TROPICAMIDE 10 MG/ML
1 SOLUTION/ DROPS OPHTHALMIC
Status: CANCELLED | OUTPATIENT
Start: 2023-11-07 | End: 2023-11-07

## 2023-11-08 ENCOUNTER — HOSPITAL ENCOUNTER (OUTPATIENT)
Facility: CLINIC | Age: 68
Setting detail: OUTPATIENT SURGERY
Discharge: HOME | End: 2023-11-08
Attending: OPHTHALMOLOGY | Admitting: OPHTHALMOLOGY
Payer: COMMERCIAL

## 2023-11-08 ENCOUNTER — ANESTHESIA EVENT (OUTPATIENT)
Dept: OPERATING ROOM | Facility: CLINIC | Age: 68
End: 2023-11-08
Payer: COMMERCIAL

## 2023-11-08 ENCOUNTER — ANESTHESIA (OUTPATIENT)
Dept: OPERATING ROOM | Facility: CLINIC | Age: 68
End: 2023-11-08
Payer: COMMERCIAL

## 2023-11-08 VITALS
HEIGHT: 65 IN | WEIGHT: 243.61 LBS | TEMPERATURE: 97 F | DIASTOLIC BLOOD PRESSURE: 56 MMHG | BODY MASS INDEX: 40.59 KG/M2 | SYSTOLIC BLOOD PRESSURE: 127 MMHG | OXYGEN SATURATION: 99 % | RESPIRATION RATE: 18 BRPM | HEART RATE: 60 BPM

## 2023-11-08 DIAGNOSIS — Z98.41 STATUS POST RIGHT CATARACT EXTRACTION: ICD-10-CM

## 2023-11-08 DIAGNOSIS — H25.812 COMBINED FORMS OF AGE-RELATED CATARACT, LEFT EYE: Primary | ICD-10-CM

## 2023-11-08 PROCEDURE — A66984 PR REMV CATARACT EXTRACAP,INSERT LENS: Performed by: ANESTHESIOLOGY

## 2023-11-08 PROCEDURE — 3600000008 HC OR TIME - EACH INCREMENTAL 1 MINUTE - PROCEDURE LEVEL THREE: Performed by: OPHTHALMOLOGY

## 2023-11-08 PROCEDURE — 2760000001 HC OR 276 NO HCPCS: Performed by: OPHTHALMOLOGY

## 2023-11-08 PROCEDURE — 2500000001 HC RX 250 WO HCPCS SELF ADMINISTERED DRUGS (ALT 637 FOR MEDICARE OP): Performed by: STUDENT IN AN ORGANIZED HEALTH CARE EDUCATION/TRAINING PROGRAM

## 2023-11-08 PROCEDURE — 7100000010 HC PHASE TWO TIME - EACH INCREMENTAL 1 MINUTE: Performed by: OPHTHALMOLOGY

## 2023-11-08 PROCEDURE — 3600000003 HC OR TIME - INITIAL BASE CHARGE - PROCEDURE LEVEL THREE: Performed by: OPHTHALMOLOGY

## 2023-11-08 PROCEDURE — 2500000004 HC RX 250 GENERAL PHARMACY W/ HCPCS (ALT 636 FOR OP/ED): Performed by: OPHTHALMOLOGY

## 2023-11-08 PROCEDURE — 2500000005 HC RX 250 GENERAL PHARMACY W/O HCPCS: Performed by: OPHTHALMOLOGY

## 2023-11-08 PROCEDURE — 2720000007 HC OR 272 NO HCPCS: Performed by: OPHTHALMOLOGY

## 2023-11-08 PROCEDURE — 66984 XCAPSL CTRC RMVL W/O ECP: CPT | Performed by: OPHTHALMOLOGY

## 2023-11-08 PROCEDURE — 3700000001 HC GENERAL ANESTHESIA TIME - INITIAL BASE CHARGE: Performed by: OPHTHALMOLOGY

## 2023-11-08 PROCEDURE — 3700000002 HC GENERAL ANESTHESIA TIME - EACH INCREMENTAL 1 MINUTE: Performed by: OPHTHALMOLOGY

## 2023-11-08 PROCEDURE — 2500000001 HC RX 250 WO HCPCS SELF ADMINISTERED DRUGS (ALT 637 FOR MEDICARE OP): Performed by: OPHTHALMOLOGY

## 2023-11-08 PROCEDURE — 94760 N-INVAS EAR/PLS OXIMETRY 1: CPT

## 2023-11-08 PROCEDURE — A66984 PR REMV CATARACT EXTRACAP,INSERT LENS

## 2023-11-08 PROCEDURE — 2500000004 HC RX 250 GENERAL PHARMACY W/ HCPCS (ALT 636 FOR OP/ED)

## 2023-11-08 PROCEDURE — 7100000009 HC PHASE TWO TIME - INITIAL BASE CHARGE: Performed by: OPHTHALMOLOGY

## 2023-11-08 RX ORDER — CYCLOPENTOLATE HYDROCHLORIDE 10 MG/ML
1 SOLUTION/ DROPS OPHTHALMIC
Status: COMPLETED | OUTPATIENT
Start: 2023-11-08 | End: 2023-11-08

## 2023-11-08 RX ORDER — TRIAMCINOLONE ACETONIDE 40 MG/ML
INJECTION, SUSPENSION INTRA-ARTICULAR; INTRAMUSCULAR AS NEEDED
Status: DISCONTINUED | OUTPATIENT
Start: 2023-11-08 | End: 2023-11-08 | Stop reason: HOSPADM

## 2023-11-08 RX ORDER — LIDOCAINE HYDROCHLORIDE 10 MG/ML
INJECTION INFILTRATION; PERINEURAL AS NEEDED
Status: DISCONTINUED | OUTPATIENT
Start: 2023-11-08 | End: 2023-11-08 | Stop reason: HOSPADM

## 2023-11-08 RX ORDER — MIDAZOLAM HYDROCHLORIDE 1 MG/ML
INJECTION, SOLUTION INTRAMUSCULAR; INTRAVENOUS AS NEEDED
Status: DISCONTINUED | OUTPATIENT
Start: 2023-11-08 | End: 2023-11-08

## 2023-11-08 RX ORDER — PHENYLEPHRINE HYDROCHLORIDE 25 MG/ML
1 SOLUTION/ DROPS OPHTHALMIC
Status: COMPLETED | OUTPATIENT
Start: 2023-11-08 | End: 2023-11-08

## 2023-11-08 RX ORDER — MOXIFLOXACIN 5 MG/ML
1 SOLUTION/ DROPS OPHTHALMIC ONCE
Status: COMPLETED | OUTPATIENT
Start: 2023-11-08 | End: 2023-11-08

## 2023-11-08 RX ORDER — ACETAMINOPHEN 325 MG/1
TABLET ORAL AS NEEDED
Status: DISCONTINUED | OUTPATIENT
Start: 2023-11-08 | End: 2023-11-08

## 2023-11-08 RX ORDER — TROPICAMIDE 10 MG/ML
1 SOLUTION/ DROPS OPHTHALMIC
Status: COMPLETED | OUTPATIENT
Start: 2023-11-08 | End: 2023-11-08

## 2023-11-08 RX ORDER — PREDNISOLONE ACETATE 10 MG/ML
1 SUSPENSION/ DROPS OPHTHALMIC 4 TIMES DAILY
Qty: 5 ML | Refills: 1 | Status: SHIPPED | OUTPATIENT
Start: 2023-11-08 | End: 2024-01-30 | Stop reason: WASHOUT

## 2023-11-08 RX ORDER — TETRACAINE HYDROCHLORIDE 5 MG/ML
1 SOLUTION OPHTHALMIC ONCE
Status: COMPLETED | OUTPATIENT
Start: 2023-11-08 | End: 2023-11-08

## 2023-11-08 RX ORDER — MOXIFLOXACIN 5 MG/ML
SOLUTION/ DROPS OPHTHALMIC AS NEEDED
Status: DISCONTINUED | OUTPATIENT
Start: 2023-11-08 | End: 2023-11-08 | Stop reason: HOSPADM

## 2023-11-08 RX ADMIN — MOXIFLOXACIN 1 DROP: 5 SOLUTION/ DROPS OPHTHALMIC at 12:30

## 2023-11-08 RX ADMIN — TROPICAMIDE 1 DROP: 10 SOLUTION/ DROPS OPHTHALMIC at 12:30

## 2023-11-08 RX ADMIN — PHENYLEPHRINE HYDROCHLORIDE 1 DROP: 25 SOLUTION/ DROPS OPHTHALMIC at 12:35

## 2023-11-08 RX ADMIN — PHENYLEPHRINE HYDROCHLORIDE 1 DROP: 25 SOLUTION/ DROPS OPHTHALMIC at 12:30

## 2023-11-08 RX ADMIN — TETRACAINE HYDROCHLORIDE 1 DROP: 5 SOLUTION/ DROPS OPHTHALMIC at 12:30

## 2023-11-08 RX ADMIN — CYCLOPENTOLATE HYDROCHLORIDE 1 DROP: 10 SOLUTION/ DROPS OPHTHALMIC at 12:35

## 2023-11-08 RX ADMIN — TROPICAMIDE 1 DROP: 10 SOLUTION/ DROPS OPHTHALMIC at 12:40

## 2023-11-08 RX ADMIN — SODIUM CHLORIDE, SODIUM LACTATE, POTASSIUM CHLORIDE, AND CALCIUM CHLORIDE: .6; .31; .03; .02 INJECTION, SOLUTION INTRAVENOUS at 13:22

## 2023-11-08 RX ADMIN — CYCLOPENTOLATE HYDROCHLORIDE 1 DROP: 10 SOLUTION/ DROPS OPHTHALMIC at 12:40

## 2023-11-08 RX ADMIN — CYCLOPENTOLATE HYDROCHLORIDE 1 DROP: 10 SOLUTION/ DROPS OPHTHALMIC at 12:30

## 2023-11-08 RX ADMIN — TROPICAMIDE 1 DROP: 10 SOLUTION/ DROPS OPHTHALMIC at 12:35

## 2023-11-08 RX ADMIN — ACETAMINOPHEN 650 MG: 325 TABLET ORAL at 13:10

## 2023-11-08 RX ADMIN — MIDAZOLAM 2 MG: 1 INJECTION INTRAMUSCULAR; INTRAVENOUS at 13:22

## 2023-11-08 RX ADMIN — PHENYLEPHRINE HYDROCHLORIDE 1 DROP: 25 SOLUTION/ DROPS OPHTHALMIC at 12:40

## 2023-11-08 SDOH — HEALTH STABILITY: MENTAL HEALTH: CURRENT SMOKER: 0

## 2023-11-08 ASSESSMENT — PAIN - FUNCTIONAL ASSESSMENT
PAIN_FUNCTIONAL_ASSESSMENT: 0-10

## 2023-11-08 ASSESSMENT — PAIN SCALES - GENERAL
PAINLEVEL_OUTOF10: 0 - NO PAIN

## 2023-11-08 NOTE — BRIEF OP NOTE
Date: 2023  OR Location: [facility] [place] [facility]    Name: Jacquelyn Buenrostro, : 1955, Age: 67 y.o., MRN: [id number], Sex: female    Diagnosis  Pre-op Diagnosis     * Combined forms of age-related cataract, left eye [H25.812] Post-op Diagnosis     * Combined forms of age-related cataract, left eye [H25.812]     Procedures  PHACO OS  41704 - VT REMOVAL LENS MATERIAL EXTRACAPSULAR      Surgeons      * Haily Amaro - Primary    Resident/Fellow/Other Assistant:  Surgeon(s) and Role:    Procedure Summary  Anesthesia: Monitor Anesthesia Care  ASA: ASA status not filed in the log.  Anesthesia Staff: Anesthesiologist: Chicho Marquez MD  C-AA: ANGELA Baker  Estimated Blood Loss: 0mL  Intra-op Medications:   Medication Name Total Dose   balanced salts (BSS) intraocular solution 500 mL   lidocaine (Xylocaine) 10 mg/mL (1 %) injection 2 mL   moxifloxacin (Vigamox) 0.5 % ophthalmic solution 1 drop   chondroitin sulf-sod hyaluron (Viscoat) intraocular injection 0.5 mL   triamcinolone acetonide (Kenalog-40) injection 40 mg              Anesthesia Record               Intraprocedure I/O Totals          Intake    .00 mL    Total Intake 100 mL          Specimen: No specimens collected     Staff:   Circulator: Bayron Clifton RN; Kimberlee Gaspar RN  Scrub Person: Gillian Odell; Annabella Benedict          Findings: Cataract left eye (OS)     Complications:  None; patient tolerated the procedure well.     Disposition: PACU - hemodynamically stable.  Condition: stable  Specimens Collected: No specimens collected  Attending Attestation: I performed the procedure.    Haily Amaro  Phone Number: [phone]

## 2023-11-08 NOTE — ANESTHESIA POSTPROCEDURE EVALUATION
Patient: Jacquelyn Buenrostro    Procedure Summary       Date: 11/08/23 Room / Location: [facility] OR 02 / [facility] [place]    Anesthesia Start: 1322 Anesthesia Stop: 1352    Procedure: PHACO OS (Left: Eye) Diagnosis:       Combined forms of age-related cataract, left eye      (Combined forms of age-related cataract, left eye [H25.812])    Surgeons: Haily Amaro MD Responsible Provider: Chihco Marquez MD    Anesthesia Type: MAC ASA Status: 2            Anesthesia Type: MAC    Vitals Value Taken Time   /54 11/08/23 1402   Temp 36.5 °C (97.7 °F) 11/08/23 1351   Pulse 61 11/08/23 1402   Resp 18 11/08/23 1402   SpO2 98 % 11/08/23 1402       Anesthesia Post Evaluation    Patient location during evaluation: PACU  Patient participation: complete - patient participated  Level of consciousness: awake and alert  Pain management: satisfactory to patient  There was medical reason for not using a multimodal analgesia pain management approach.Airway patency: patent  Cardiovascular status: blood pressure returned to baseline  Respiratory status: spontaneous ventilation  Hydration status: acceptable        No notable events documented.

## 2023-11-08 NOTE — OP NOTE
PHACO OS (L) Operative Note     Date: 2023  OR Location: [facility] OR    Name: Jacquelyn Buenrostro, : 1955, Age: 67 y.o., MRN: [id number], Sex: female    Diagnosis  Pre-op Diagnosis     * Combined forms of age-related cataract, left eye [H25.812] Post-op Diagnosis     * Combined forms of age-related cataract, left eye [H25.812]     Procedures  PHACO OS  25870 - LA REMOVAL LENS MATERIAL EXTRACAPSULAR      Surgeons      * Haily Amaro - Primary    Resident/Fellow/Other Assistant:  Surgeon(s) and Role:    Procedure Summary  Anesthesia: Monitor Anesthesia Care  ASA: ASA status not filed in the log.  Anesthesia Staff: Anesthesiologist: Chicho Marquez MD  C-AA: ANGELA Baker  Estimated Blood Loss: 0mL  Intra-op Medications:   Medication Name Total Dose   balanced salts (BSS) intraocular solution 500 mL   lidocaine (Xylocaine) 10 mg/mL (1 %) injection 2 mL   moxifloxacin (Vigamox) 0.5 % ophthalmic solution 1 drop   chondroitin sulf-sod hyaluron (Viscoat) intraocular injection 0.5 mL   triamcinolone acetonide (Kenalog-40) injection 40 mg              Anesthesia Record               Intraprocedure I/O Totals       None           Specimen: No specimens collected     Staff:   Circulator: Bayron Clifton RN; Kimberlee Gaspar RN  Scrub Person: Gillian Odell; Annabella Benedict         Drains and/or Catheters: * None in log *    Tourniquet Times:         Implants:  Implants       Type Name Action Serial No.       17.0 DIOPTER, TECNIS EYHANCE 1-PIECE IOL W/ SIMPLICITY DELIVERY SYSTEM, BICONVEX, UV-BLOCKING HYDROPHOBIC ACRYLIC, MODEL DIB00 Implanted [id number]              Findings: Cataract OS    Indications: Jacquelyn Buenrostro is an 67 y.o. female who is having surgery for Combined forms of age-related cataract, left eye [H25.812].     The patient was seen in the preoperative area. The risks, benefits, complications, treatment options, non-operative alternatives, expected recovery and outcomes were discussed with the  patient. The possibilities of reaction to medication, pulmonary aspiration, injury to surrounding structures, bleeding, recurrent infection, the need for additional procedures, failure to diagnose a condition, and creating a complication requiring transfusion or operation were discussed with the patient. The patient concurred with the proposed plan, giving informed consent.  The site of surgery was properly noted/marked if necessary per policy. The patient has been actively warmed in preoperative area. Preoperative antibiotics have been ordered and given within 1 hours of incision. Venous thrombosis prophylaxis are not indicated.    Procedure Details: The patient was placed in the supine position on the operating room table where appropriate blood pressure and cardiac monitoring were initiated. The patient was prepped and draped in the usual sterile fashion for intraocular surgery. This included instillation of Betadine 5% onto the ocular surface followed by irrigation with balance salt solution a minute or two later. A lid speculum was placed and the operating microscope was positioned. One paracentesis stab incision was made to the left of the planned cataract incision with a 15-degree supersharp blade. 1 ml of preservative free lidocaine was injected into the AC. Viscoat was used to replace the aqueous humor. A temporal clear corneal wound was fashioned beginning at the limbus with a 2.2 mm keratome, extending 2 mm into clear cornea before entering the anterior chamber. A continuous tear circular capsulorhexis of approximately 5 mm in diameter was performed. Hydrodissection was performed using a Cross canula. The endothelium was coated with viscoat again. Using the Ozil handpiece on the RECESS. Lens Removal System, the nucleus was emulsified and aspirated using a divide-and-conquer technique. Residual cortex was removed from the eye with the irrigation/aspiration bimanually. ProVisc was used to inflate the  capsular bag. The lens implant was inspected and found to be free of visible defects. The lens used was model DIB00, power 17.0 diopter intraocular lens. The lens was inserted into the capsular bag. The lens was centered with a Rogel hook. Residual viscoelastic was removed from the eye with the irrigation/aspiration instrument. Preservative free moxifloxacin was injected  intracameral. The wounds were checked and found to be watertight. 0.3ml of Diluted (1:3) triamcinolone acetonide was injected subonj inferiorly. The lid speculum was removed and the eye was dressed with Maxitrol ointment, eye pad, tape, and shield. The patient tolerated the procedure well, and there were no complications.   Complications:  None; patient tolerated the procedure well.    Disposition: PACU - hemodynamically stable.  Condition: stable         Additional Details: None    Attending Attestation: I performed the procedure.    Haily Amaro  Phone Number: [phone]

## 2023-11-08 NOTE — ANESTHESIA PREPROCEDURE EVALUATION
Patient: Jacquelyn Buenrostro    Procedure Information       Anesthesia Start Date/Time: 11/08/23 1322    Procedure: PHACO OS (Left: Eye)    [facility]    Surgeons: Haily Amaro MD            Relevant Problems   Cardiovascular   (+) Complete AV block (CMS/HCC)   (+) Diastolic heart failure, stage C (CMS/HCC)   (+) HLD (hyperlipidemia)   (+) HTN (hypertension), benign   (+) Pacemaker      Endocrine   (+) Class 2 obesity with body mass index (BMI) of 38.0 to 38.9 in adult      Hematology   (+) Anemia       Clinical information reviewed:   Tobacco  Allergies  Meds  Problems  Med Hx  Surg Hx  OB Status    Fam Hx  Soc Hx        NPO Detail:  NPO/Void Status  Date of Last Liquid: 11/08/23  Time of Last Liquid: 0600  Date of Last Solid: 11/07/23  Time of Last Solid: 2100  Time of Last Void: 1223         Physical Exam    Airway  Mallampati: II     Cardiovascular   Rhythm: regular  Rate: normal     Dental    Pulmonary   Breath sounds clear to auscultation     Abdominal            Anesthesia Plan    ASA 2     MAC     The patient is not a current smoker.    intravenous induction

## 2023-11-08 NOTE — H&P
History Of Present Illness  Jacquelyn Buenrostro is a 67 y.o. female presenting for planned CEIOL OS.     Past Medical History  Past Medical History:   Diagnosis Date    Arthritis     Encounter for follow-up examination after completed treatment for conditions other than malignant neoplasm 09/07/2022    Hospital discharge follow-up    Hyperlipidemia     Hypertension     Morbid (severe) obesity due to excess calories (CMS/[facility]) 08/30/2022    Morbid obesity with BMI of 40.0-44.9, adult    Other conditions influencing health status 08/30/2022    History of cough    Pacemaker     Personal history of other specified conditions 08/30/2022    History of paroxysmal nocturnal dyspnea    Unspecified open wound, unspecified ankle, initial encounter 08/30/2022    Wound of ankle       Surgical History  Past Surgical History:   Procedure Laterality Date    CARDIAC CATHETERIZATION      CATARACT EXTRACTION W/  INTRAOCULAR LENS IMPLANT Right 10/11/2023    Dr Amaro    INSERT / REPLACE / REMOVE PACEMAKER      OTHER SURGICAL HISTORY  09/07/2022    Pacemaker insertion        Social History  She reports that she has never smoked. She has never been exposed to tobacco smoke. She has never used smokeless tobacco. She reports that she does not currently use alcohol. She reports that she does not use drugs.    Family History  Family History   Problem Relation Name Age of Onset    Cancer Mother      Macular degeneration Mother      Cancer Other Multiple Family Member         Allergies  Aspirin    Review of Systems   All other systems reviewed and are negative.         Physical Exam  Constitutional: No acute distress   HEENT: mucus membranes moist, atraumatic   Respiratory: no respiratory distress   Cardiovascular: no peripheral edema  Abdomen: non distended   MSK/neuro: moves all extremities spontaneously   Skin: no rashes   Psych: alert and oriented        Last Recorded Vitals  There were no vitals taken for this visit.    Relevant Results          Assessment/Plan   Active Problems:  There are no active Hospital Problems.  Jacquelyn Buenrostro is a 67 y.o. female presenting for planned CEIOL OS.           Majo Linda MD

## 2023-11-08 NOTE — DISCHARGE INSTRUCTIONS
Start the following eye drops in the operative eye:     Postop instructions:  Prednisolone acetate drops:  4 times/day for 2 weeks  3 times/day for 2 weeks  2 times/day for 2 weeks  Once/day for 2 weeks     Sleep with shield at night for 7 days  No eye rubbing  May shower and wash face but no water inside surgery eye  No lifting any weight above 10lbs for 2 weeks  Patient to resume home medications.   Please call immediately if you develop any redness, pain, decreased vision, flashes, floaters.   Office phone (after hours): [phone]   Hospital : [phone] (call this number if unable to reach someone on the phone above) then ask for ophthalmologist on call.        Had tylenol at 1:10pm, you can have tylenol again at 7:10pm if needed.

## 2023-11-09 ENCOUNTER — OFFICE VISIT (OUTPATIENT)
Dept: OPHTHALMOLOGY | Facility: CLINIC | Age: 68
End: 2023-11-09
Payer: COMMERCIAL

## 2023-11-09 DIAGNOSIS — Z96.1 PSEUDOPHAKIA: Primary | ICD-10-CM

## 2023-11-09 PROCEDURE — 99024 POSTOP FOLLOW-UP VISIT: CPT | Performed by: OPHTHALMOLOGY

## 2023-11-09 ASSESSMENT — ENCOUNTER SYMPTOMS
ENDOCRINE NEGATIVE: 0
CARDIOVASCULAR NEGATIVE: 0
HEMATOLOGIC/LYMPHATIC NEGATIVE: 0
EYES NEGATIVE: 0
CONSTITUTIONAL NEGATIVE: 0
RESPIRATORY NEGATIVE: 0
NEUROLOGICAL NEGATIVE: 0
ALLERGIC/IMMUNOLOGIC NEGATIVE: 0
MUSCULOSKELETAL NEGATIVE: 0
PSYCHIATRIC NEGATIVE: 0
GASTROINTESTINAL NEGATIVE: 0

## 2023-11-09 ASSESSMENT — EXTERNAL EXAM - LEFT EYE: OS_EXAM: NORMAL

## 2023-11-09 ASSESSMENT — EXTERNAL EXAM - RIGHT EYE: OD_EXAM: NORMAL

## 2023-11-09 ASSESSMENT — VISUAL ACUITY
OS_SC: 20/20
METHOD: SNELLEN - LINEAR

## 2023-11-09 ASSESSMENT — PAIN SCALES - GENERAL: PAINLEVEL_OUTOF10: 0 - NO PAIN

## 2023-11-09 ASSESSMENT — TONOMETRY
IOP_METHOD: GOLDMANN APPLANATION
OS_IOP_MMHG: 17

## 2023-11-09 ASSESSMENT — SLIT LAMP EXAM - LIDS
COMMENTS: NORMAL
COMMENTS: NORMAL

## 2023-11-09 NOTE — PATIENT INSTRUCTIONS
Postop instructions   Prednisolone acetate drops:  4 times/day for 1 week  3 times/day for 1 week  2 times/day for 1 week  Once/day for 1 week      Sleep with shield at night for 7 days  No eye rubbing  May shower and wash your face but no water inside surgery eye  No lifting any weight above 10lbs

## 2023-11-30 ENCOUNTER — OFFICE VISIT (OUTPATIENT)
Dept: OPHTHALMOLOGY | Facility: CLINIC | Age: 68
End: 2023-11-30
Payer: COMMERCIAL

## 2023-11-30 DIAGNOSIS — H25.813 COMBINED FORMS OF AGE-RELATED CATARACT OF BOTH EYES: Primary | ICD-10-CM

## 2023-11-30 PROCEDURE — 99024 POSTOP FOLLOW-UP VISIT: CPT | Performed by: OPHTHALMOLOGY

## 2023-11-30 ASSESSMENT — VISUAL ACUITY
OS_SC: J3
OD_SC: 20/20-2
OS_SC: 20/20
METHOD: SNELLEN - LINEAR
OD_SC: J3

## 2023-11-30 ASSESSMENT — SLIT LAMP EXAM - LIDS
COMMENTS: NORMAL
COMMENTS: NORMAL

## 2023-11-30 ASSESSMENT — CONF VISUAL FIELD
OS_INFERIOR_TEMPORAL_RESTRICTION: 0
OS_NORMAL: 1
OD_INFERIOR_NASAL_RESTRICTION: 0
OD_INFERIOR_TEMPORAL_RESTRICTION: 0
OS_SUPERIOR_TEMPORAL_RESTRICTION: 0
OD_SUPERIOR_NASAL_RESTRICTION: 0
OD_NORMAL: 1
OS_SUPERIOR_NASAL_RESTRICTION: 0
OS_INFERIOR_NASAL_RESTRICTION: 0
OD_SUPERIOR_TEMPORAL_RESTRICTION: 0
METHOD: COUNTING FINGERS

## 2023-11-30 ASSESSMENT — REFRACTION_MANIFEST
OS_SPHERE: -0.25
OD_CYLINDER: -0.50
OD_ADD: +2.50
OS_CYLINDER: -0.25
OD_AXIS: 025
METHOD_AUTOREFRACTION: 1
OD_SPHERE: -0.25
OS_AXIS: 060
OS_ADD: +2.50

## 2023-11-30 ASSESSMENT — ENCOUNTER SYMPTOMS: EYES NEGATIVE: 1

## 2023-11-30 ASSESSMENT — EXTERNAL EXAM - LEFT EYE: OS_EXAM: NORMAL

## 2023-11-30 ASSESSMENT — TONOMETRY
IOP_METHOD: TONOPEN
OS_IOP_MMHG: 18
OD_IOP_MMHG: 14

## 2023-11-30 ASSESSMENT — EXTERNAL EXAM - RIGHT EYE: OD_EXAM: NORMAL

## 2023-11-30 NOTE — PROGRESS NOTES
POW 3 s/p phaco OS  PO gtts taper as instructed  ER precautions   PO instructions  F/u with SK as scheduled

## 2023-12-05 ENCOUNTER — OFFICE VISIT (OUTPATIENT)
Dept: PRIMARY CARE | Facility: CLINIC | Age: 68
End: 2023-12-05
Payer: COMMERCIAL

## 2023-12-05 VITALS
TEMPERATURE: 97.2 F | BODY MASS INDEX: 40.42 KG/M2 | DIASTOLIC BLOOD PRESSURE: 69 MMHG | HEIGHT: 65 IN | WEIGHT: 242.6 LBS | HEART RATE: 82 BPM | SYSTOLIC BLOOD PRESSURE: 102 MMHG

## 2023-12-05 DIAGNOSIS — Z13.89 SCREENING FOR OBESITY: ICD-10-CM

## 2023-12-05 DIAGNOSIS — Z00.00 ROUTINE GENERAL MEDICAL EXAMINATION AT HEALTH CARE FACILITY: Primary | ICD-10-CM

## 2023-12-05 DIAGNOSIS — Z13.820 ENCOUNTER FOR OSTEOPOROSIS SCREENING IN ASYMPTOMATIC POSTMENOPAUSAL PATIENT: ICD-10-CM

## 2023-12-05 DIAGNOSIS — Z00.00 MEDICARE ANNUAL WELLNESS VISIT, SUBSEQUENT: ICD-10-CM

## 2023-12-05 DIAGNOSIS — Z13.1 DIABETES MELLITUS SCREENING: ICD-10-CM

## 2023-12-05 DIAGNOSIS — E66.01 OBESITY, CLASS III, BMI 40-49.9 (MORBID OBESITY) (MULTI): ICD-10-CM

## 2023-12-05 DIAGNOSIS — Z12.31 BREAST CANCER SCREENING BY MAMMOGRAM: ICD-10-CM

## 2023-12-05 DIAGNOSIS — Z13.31 DEPRESSION SCREENING: ICD-10-CM

## 2023-12-05 DIAGNOSIS — Z23 NEED FOR PNEUMOCOCCAL VACCINE: ICD-10-CM

## 2023-12-05 DIAGNOSIS — E78.5 HYPERLIPIDEMIA, UNSPECIFIED HYPERLIPIDEMIA TYPE: ICD-10-CM

## 2023-12-05 DIAGNOSIS — Z78.0 ENCOUNTER FOR OSTEOPOROSIS SCREENING IN ASYMPTOMATIC POSTMENOPAUSAL PATIENT: ICD-10-CM

## 2023-12-05 DIAGNOSIS — Z78.9 NON-SMOKER: ICD-10-CM

## 2023-12-05 DIAGNOSIS — I83.12 VARICOSE VEINS OF BOTH LOWER EXTREMITIES WITH INFLAMMATION: ICD-10-CM

## 2023-12-05 DIAGNOSIS — I83.11 VARICOSE VEINS OF BOTH LOWER EXTREMITIES WITH INFLAMMATION: ICD-10-CM

## 2023-12-05 DIAGNOSIS — Z53.20 COLON CANCER SCREENING DECLINED: ICD-10-CM

## 2023-12-05 DIAGNOSIS — Z11.59 NEED FOR HEPATITIS C SCREENING TEST: ICD-10-CM

## 2023-12-05 DIAGNOSIS — Z78.9 PATIENT HAD NO FALLS IN PAST YEAR: ICD-10-CM

## 2023-12-05 DIAGNOSIS — Z71.85 VACCINE COUNSELING: ICD-10-CM

## 2023-12-05 DIAGNOSIS — R94.6 ABNORMAL THYROID FUNCTION TEST: ICD-10-CM

## 2023-12-05 PROBLEM — E66.813 OBESITY, CLASS III, BMI 40-49.9 (MORBID OBESITY): Status: ACTIVE | Noted: 2023-12-05

## 2023-12-05 PROCEDURE — 1170F FXNL STATUS ASSESSED: CPT | Performed by: INTERNAL MEDICINE

## 2023-12-05 PROCEDURE — 99212 OFFICE O/P EST SF 10 MIN: CPT | Performed by: INTERNAL MEDICINE

## 2023-12-05 PROCEDURE — 1126F AMNT PAIN NOTED NONE PRSNT: CPT | Performed by: INTERNAL MEDICINE

## 2023-12-05 PROCEDURE — 1159F MED LIST DOCD IN RCRD: CPT | Performed by: INTERNAL MEDICINE

## 2023-12-05 PROCEDURE — 1160F RVW MEDS BY RX/DR IN RCRD: CPT | Performed by: INTERNAL MEDICINE

## 2023-12-05 PROCEDURE — 3078F DIAST BP <80 MM HG: CPT | Performed by: INTERNAL MEDICINE

## 2023-12-05 PROCEDURE — 1036F TOBACCO NON-USER: CPT | Performed by: INTERNAL MEDICINE

## 2023-12-05 PROCEDURE — 3074F SYST BP LT 130 MM HG: CPT | Performed by: INTERNAL MEDICINE

## 2023-12-05 PROCEDURE — G0439 PPPS, SUBSEQ VISIT: HCPCS | Performed by: INTERNAL MEDICINE

## 2023-12-05 PROCEDURE — 90677 PCV20 VACCINE IM: CPT | Performed by: INTERNAL MEDICINE

## 2023-12-05 PROCEDURE — G0009 ADMIN PNEUMOCOCCAL VACCINE: HCPCS | Performed by: INTERNAL MEDICINE

## 2023-12-05 ASSESSMENT — ACTIVITIES OF DAILY LIVING (ADL)
BATHING: INDEPENDENT
TAKING_MEDICATION: INDEPENDENT
DOING_HOUSEWORK: INDEPENDENT
DRESSING: INDEPENDENT
MANAGING_FINANCES: INDEPENDENT
GROCERY_SHOPPING: INDEPENDENT

## 2023-12-05 ASSESSMENT — PATIENT HEALTH QUESTIONNAIRE - PHQ9
1. LITTLE INTEREST OR PLEASURE IN DOING THINGS: NOT AT ALL
SUM OF ALL RESPONSES TO PHQ9 QUESTIONS 1 AND 2: 0
2. FEELING DOWN, DEPRESSED OR HOPELESS: NOT AT ALL

## 2023-12-05 NOTE — PROGRESS NOTES
"Subjective   Patient ID: Jacquelyn Buenrostro is a 68 y.o. female who presents for Medicare Annual Wellness Visit Subsequent (Medicare AWV).    HPI Pt is a pleasant 68 y.o. CF with the extensive PMHX who was seen and evaluated during the AWV medicare, subsequent. Pt states that she has no active issues or concerns other than varicose vein issue. Pt states that she had a flu vaccine before thanksgiving a the local pharmacy. Pt was not sure if she ever had a pneumonia vaccine. During the clinical encounter pt denies fever, chills, no SOB, no chest pain/tightness, no abdominal pain, no N/V/D reported, no change of urination reported. Pt denies any other health concerns during this visit.  Sohx: reviewed  PMHx and Fhx: reviewed    Review of Systems  Constitutional: no fever, no chills  Eyes: no eyesight problems, no eye redness, no eye pain, no blurred vision  ENT: no ear pain or sore throat, no nasal discharge or congestion, no sinus pressure, no hearing loss  Cardiovascular: no chest pain or tightness, no SOB, the heart rate was not fast or slow  Respiratory: no cough, no dyspnea with exertion or with rest, no wheezing  Gastrointestinal: no abdominal pain, no constipation or diarrhea, no heartburn, no nausea or vomiting  Genitourinary: no urine frequency, no dysuria, no urinary urgency, no urinary incontinence or retention  Musculoskeletal: no back pain, no arthralgia, no joint swelling or stiffness, no muscle weakness  Integumentary: no skin lesions or rash  Neurological: no headaches, no dizziness or fainting, no limb weakness  Psychiatric: no mood changes, no anxiety or depression, no suicidal thoughts  Endocrine: no weight change, no temperature intolerance, no change of appetite  Hematologic/Lymphatic: no easy bruising or bleeding  Objective   /69 (BP Location: Left arm, Patient Position: Sitting, BP Cuff Size: Large adult)   Pulse 82   Temp 36.2 °C (97.2 °F)   Ht 1.651 m (5' 5\")   Wt 110 kg (242 lb 9.6 oz)  "  BMI 40.37 kg/m²     Physical Exam  Constitutional: well hydrated, well nourished, no acute distress. Vital signs reviewed  Head and face: normocephalic, atraumatic  Eyes: no erythema, edema or visible abnormalities of conjunctiva or lids. Pupils are equal, round and reactive to light. Extraocular movement normal  ENT: external ears without deformities  Neck: full ROM, no adenopathy, neck was supple, thyroid gland not enlarged, no palpable nodules  Pulmonary: normal respiratory rate and effort. Lungs are clear to auscultation, no rales,no rhonchi or wheezing  Cardiovascular: regular rate and rhythm, normal S1 and S2, no murmur, no gallop, posterior tib. pulse normal 2+ bilaterally, trace bilateral ankle edema. Varicose veins of the both lower extremities noticed  Abdomen: soft, non tender on palpation, not distended, normal BS in all 4 quadrants, no CVA tenderness  Musculoskeletal: no joint swelling, normal movements of all 4 extremities. Gait and station: normal, Digits and nails: normal without clubbing  Skin: normal color, no rash  Neurologic: Cranial nerves: CN II: visual acuity intact. Source: acuity reported by patient. Pupils reactive to light with normal accommodation. Right and left pupil normal CN III, IV and VI: the EO movements were intact. CN VIII: no hearing loss. Deep tendon reflexes: were 2+ and symmetric: R and L patella.  Sensory exam: normal light to touch  Psychiatric: Mood and affect: normal, Alert and Oriented x 3  Lymphatic: no cervical lymphadenopathy    Assessment/Plan   AWV medicare visit  Hx and PE as mentioned  Obesity screening: BMI of 40 and pt will be beneficial form the lifestyle modifications  MDD screening was negative, PHQ2 score is o  Fall screening: no fall during the last calendar year  Hep C screening: will order HCV RNA blood test  Will screening the pt luong DM, HGBA1C test ordered  Breast ca screening: screening b/l mammogram was ordered  Osteoporosis screening: DEXA scan was  ordered  Colon CA screening was declined  Pt pt was counseled about the age appropriate vaccination: Pneumonia vaccine 1 dose PCV20 was done today  AWV in 1 year    HTN: well controlled on the current medications regimen  HLD: on simvastatin 20 mg PO daily. Lipid panel ordered  Pt has the pacemaker in place: pt FU with the cardiology team on the regular basis  Varicose vein, b/l: will provide the referral for the vascular team evaluation  Pt has the hx of abnormal TSH level  Will order CBC, CMP, TSH/T4 level  Plan was d/c with the pt in details, verbalized understanding, agreed  Please follow up with PCP as planned or sooner if needed

## 2023-12-14 ENCOUNTER — APPOINTMENT (OUTPATIENT)
Dept: RADIOLOGY | Facility: HOSPITAL | Age: 68
End: 2023-12-14
Payer: COMMERCIAL

## 2024-01-11 ENCOUNTER — HOSPITAL ENCOUNTER (OUTPATIENT)
Dept: RADIOLOGY | Facility: HOSPITAL | Age: 69
Discharge: HOME | End: 2024-01-11
Payer: COMMERCIAL

## 2024-01-11 ENCOUNTER — TELEPHONE (OUTPATIENT)
Dept: PRIMARY CARE | Facility: CLINIC | Age: 69
End: 2024-01-11
Payer: COMMERCIAL

## 2024-01-11 ENCOUNTER — APPOINTMENT (OUTPATIENT)
Dept: RADIOLOGY | Facility: HOSPITAL | Age: 69
End: 2024-01-11
Payer: COMMERCIAL

## 2024-01-11 DIAGNOSIS — Z13.820 ENCOUNTER FOR OSTEOPOROSIS SCREENING IN ASYMPTOMATIC POSTMENOPAUSAL PATIENT: ICD-10-CM

## 2024-01-11 DIAGNOSIS — Z23 NEED FOR PNEUMOCOCCAL VACCINE: ICD-10-CM

## 2024-01-11 DIAGNOSIS — Z71.85 VACCINE COUNSELING: ICD-10-CM

## 2024-01-11 DIAGNOSIS — Z78.0 ENCOUNTER FOR OSTEOPOROSIS SCREENING IN ASYMPTOMATIC POSTMENOPAUSAL PATIENT: ICD-10-CM

## 2024-01-11 PROCEDURE — 77080 DXA BONE DENSITY AXIAL: CPT | Performed by: RADIOLOGY

## 2024-01-11 PROCEDURE — 77080 DXA BONE DENSITY AXIAL: CPT

## 2024-01-11 NOTE — TELEPHONE ENCOUNTER
Telephone call to patient, spoke with patient informed patient of normal DEXA scan. Patient understands and has no questions at this time.

## 2024-01-11 NOTE — TELEPHONE ENCOUNTER
----- Message from Kaye Grey MD sent at 1/11/2024  3:42 PM EST -----  Please, inform the pt that the recent DEXA showed normal bones density,  Thank you

## 2024-01-12 ENCOUNTER — OFFICE VISIT (OUTPATIENT)
Dept: CARDIOLOGY | Facility: CLINIC | Age: 69
End: 2024-01-12
Payer: COMMERCIAL

## 2024-01-12 VITALS
DIASTOLIC BLOOD PRESSURE: 81 MMHG | OXYGEN SATURATION: 95 % | HEART RATE: 85 BPM | SYSTOLIC BLOOD PRESSURE: 148 MMHG | HEIGHT: 65 IN | WEIGHT: 246 LBS | BODY MASS INDEX: 40.98 KG/M2

## 2024-01-12 DIAGNOSIS — I50.30 DIASTOLIC HEART FAILURE, STAGE C (MULTI): Primary | ICD-10-CM

## 2024-01-12 PROCEDURE — 1160F RVW MEDS BY RX/DR IN RCRD: CPT | Performed by: NURSE PRACTITIONER

## 2024-01-12 PROCEDURE — 1159F MED LIST DOCD IN RCRD: CPT | Performed by: NURSE PRACTITIONER

## 2024-01-12 PROCEDURE — 99213 OFFICE O/P EST LOW 20 MIN: CPT | Performed by: NURSE PRACTITIONER

## 2024-01-12 PROCEDURE — 1036F TOBACCO NON-USER: CPT | Performed by: NURSE PRACTITIONER

## 2024-01-12 PROCEDURE — 1126F AMNT PAIN NOTED NONE PRSNT: CPT | Performed by: NURSE PRACTITIONER

## 2024-01-12 PROCEDURE — 3077F SYST BP >= 140 MM HG: CPT | Performed by: NURSE PRACTITIONER

## 2024-01-12 PROCEDURE — 3079F DIAST BP 80-89 MM HG: CPT | Performed by: NURSE PRACTITIONER

## 2024-01-12 ASSESSMENT — ENCOUNTER SYMPTOMS
DYSPNEA ON EXERTION: 0
DECREASED APPETITE: 0
WEIGHT GAIN: 0
WEIGHT LOSS: 0
NEAR-SYNCOPE: 0
SLEEP DISTURBANCES DUE TO BREATHING: 0
NAUSEA: 0
PALPITATIONS: 0
LIGHT-HEADEDNESS: 0
PND: 0
DIZZINESS: 0
SHORTNESS OF BREATH: 0
VOMITING: 0
IRREGULAR HEARTBEAT: 0
SYNCOPE: 0
ORTHOPNEA: 0
DIARRHEA: 0

## 2024-01-12 NOTE — PROGRESS NOTES
"Subjective     Chief Complaint:  67yo patient of Dr. Linares/Dr. Geiger here for 3 month follow up for heart failure.     HPI  Most recent visit 10/13/23. Order for PET placed, not performed.     Interval Hx: FUV with EP, Dr. Quijano. No changes. Cataract surgery completed w/o complication 23. FUV w/PCP, referred to vascular for varicose vein evaluation. Labs ordered.    PMHx: HTN, HFpEF, High Grade AV Block s/p PPM (22)  PSHx: Denies.   Social Hx: Never smoker. Denies EtOH/illicit drug use.   Family Hx: Mother- CHF,  @ 92.      Denies chest pain. Denies palpitations. Denies SOB on exertion. Able to climb 14 stairs w/o difficulty. Denies orthopnea/PND. Denies lightheadedness, dizziness, and syncope. Denies edema. Medication adherent- takes Clonidine 0.1mg QD. Eating/drinking well. Denies N/V/D. Home BP monitoring SBP 140s prior to medications 130s after medications.      Review of Systems   Constitutional: Negative for decreased appetite, weight gain and weight loss.   Cardiovascular:  Negative for chest pain, dyspnea on exertion, irregular heartbeat, leg swelling, near-syncope, orthopnea, palpitations, paroxysmal nocturnal dyspnea and syncope.   Respiratory:  Negative for shortness of breath and sleep disturbances due to breathing.    Gastrointestinal:  Negative for diarrhea, nausea and vomiting.   Neurological:  Negative for dizziness and light-headedness.     Objective   Visit Vitals  /81 (BP Location: Left arm, Patient Position: Sitting)   Pulse 85   Ht 1.651 m (5' 5\")   Wt 112 kg (246 lb)   SpO2 95%   BMI 40.94 kg/m²   OB Status Postmenopausal   Smoking Status Never   BSA 2.27 m²       Vitals reviewed.   Constitutional:       Appearance: Healthy appearance.   Neck:      Vascular: No hepatojugular reflux or JVD. JVD normal.   Pulmonary:      Effort: Pulmonary effort is normal.      Breath sounds: Normal breath sounds.   Cardiovascular:      Normal rate. Regular rhythm.      Murmurs: " There is no murmur.      No gallop.  No click. No rub.      Comments: BLE w/visible varicosities   Edema:     Peripheral edema absent.   Abdominal:      General: There is no distension.   Skin:     General: Skin is warm and dry.   Neurological:      Mental Status: Alert and oriented to person, place and time.       Lab Review:   Lab Results   Component Value Date     01/10/2023    K 4.1 01/10/2023     01/10/2023    CO2 25 01/10/2023    BUN 24 (H) 01/10/2023    CREATININE 1.14 (H) 01/10/2023    GLUCOSE 145 (H) 01/10/2023    CALCIUM 9.2 01/10/2023     Lab Results   Component Value Date    WBC 6.4 01/10/2023    HGB 11.4 (L) 01/10/2023    HCT 36.0 01/10/2023    MCV 90 01/10/2023     01/10/2023       Current Outpatient Medications:     amLODIPine (Norvasc) 5 mg tablet, Take 1 tablet (5 mg) by mouth once daily., Disp: , Rfl:     carvedilol (Coreg) 25 mg tablet, Take 2 tablets (50 mg) by mouth 2 times a day., Disp: , Rfl:     cloNIDine (Catapres) 0.1 mg tablet, Take 1 tablet (0.1 mg) by mouth 3 times a day as needed (systolic blood pressure greater than 170)., Disp: , Rfl:     furosemide (Lasix) 20 mg tablet, Take 1 tablet (20 mg) by mouth once daily., Disp: , Rfl:     lisinopril 20 mg tablet, Take 0.5 tablets (10 mg) by mouth 2 times a day., Disp: , Rfl:     multivitamin-min-FA-ginkgo (One Daily Women 50 Plus) 400-120 mcg-mg tablet, Take 1 tablet by mouth once daily., Disp: , Rfl:     prednisoLONE acetate (Pred-Forte) 1 % ophthalmic suspension, Administer 1 drop into the left eye 4 times a day. (Patient taking differently: Administer 1 drop into the left eye 3 times a day.), Disp: 5 mL, Rfl: 1    simvastatin (Zocor) 20 mg tablet, Take 1 tablet (20 mg) by mouth once daily., Disp: , Rfl:     vit C/E/zinc/lutein/zeaxanthin (OCUVITE EYE HEALTH ORAL), Take 1 tablet by mouth once daily., Disp: , Rfl:     Assessment/Plan   Chronic Diastolic Heart Failure  Asymptomatic from a cardiovascular standpoint. Appears  euvolemic and normotensive on exam. Most recent TTE 6/22/23 LVEF 65-70% w/impaired relaxation pattern of LV diastolic filling. Mild MR. cMRI 2/14/23 LVEF 43% w/diffuse hyperenhancement of basal anterior septum. Focal confluent hyperenhancement of the mid anterior ventricular junction 1.3cm. Findings c/w HCM. Asymmetric septal LVH (1.9cm). BNP 8/30/22 502. NYHA Class I Stage C HFpEF. Continue Furosemide 20mg QD.  PET scan to evaluate for sarcoidosis on 1/18/23.      HTN  Continue Lisinopril 10mg BID, Amlodipine 5mg every day, Carvedilol 50mg BID, and Clonidine 0.1mg TID prn.

## 2024-01-12 NOTE — PATIENT INSTRUCTIONS
Continue current medications as ordered.   We will call you with results after PET scan on 1/18/23.   Call [phone] to schedule 6 month follow up with Dr. Geiger.

## 2024-01-18 ENCOUNTER — HOSPITAL ENCOUNTER (OUTPATIENT)
Dept: RADIOLOGY | Facility: HOSPITAL | Age: 69
Discharge: HOME | End: 2024-01-18
Payer: COMMERCIAL

## 2024-01-18 DIAGNOSIS — I42.2 OTHER HYPERTROPHIC CARDIOMYOPATHY (MULTI): ICD-10-CM

## 2024-01-18 DIAGNOSIS — I50.30 DIASTOLIC HEART FAILURE, STAGE C (MULTI): ICD-10-CM

## 2024-01-18 LAB — GLUCOSE BLD MANUAL STRIP-MCNC: 89 MG/DL (ref 74–99)

## 2024-01-18 PROCEDURE — 3430000001 HC RX 343 DIAGNOSTIC RADIOPHARMACEUTICALS: Mod: MUE | Performed by: NURSE PRACTITIONER

## 2024-01-18 PROCEDURE — A9552 F18 FDG: HCPCS | Mod: MUE | Performed by: NURSE PRACTITIONER

## 2024-01-18 PROCEDURE — 82947 ASSAY GLUCOSE BLOOD QUANT: CPT

## 2024-01-18 PROCEDURE — 78432 MYOCRD IMG PET 2RTRACER: CPT

## 2024-01-18 PROCEDURE — 3430000001 HC RX 343 DIAGNOSTIC RADIOPHARMACEUTICALS: Performed by: NURSE PRACTITIONER

## 2024-01-18 PROCEDURE — A9526 NITROGEN N-13 AMMONIA: HCPCS | Performed by: NURSE PRACTITIONER

## 2024-01-18 PROCEDURE — 78431 MYOCRD IMG PET RST&STRS CT: CPT | Performed by: NUCLEAR MEDICINE

## 2024-01-18 RX ORDER — FLUDEOXYGLUCOSE F 18 200 MCI/ML
12.04 INJECTION, SOLUTION INTRAVENOUS
Status: COMPLETED | OUTPATIENT
Start: 2024-01-18 | End: 2024-01-18

## 2024-01-18 RX ORDER — AMMONIA N-13 37.5 MCI/ML
11 INJECTION INTRAVENOUS
Status: COMPLETED | OUTPATIENT
Start: 2024-01-18 | End: 2024-01-18

## 2024-01-18 RX ADMIN — FLUDEOXYGLUCOSE F 18 12.04 MILLICURIE: 200 INJECTION, SOLUTION INTRAVENOUS at 11:56

## 2024-01-18 RX ADMIN — AMMONIA N-13 11 MILLICURIE: 37.5 INJECTION INTRAVENOUS at 11:25

## 2024-01-30 ENCOUNTER — OFFICE VISIT (OUTPATIENT)
Dept: PRIMARY CARE | Facility: CLINIC | Age: 69
End: 2024-01-30
Payer: COMMERCIAL

## 2024-01-30 VITALS
HEART RATE: 66 BPM | SYSTOLIC BLOOD PRESSURE: 119 MMHG | WEIGHT: 247 LBS | HEIGHT: 65 IN | BODY MASS INDEX: 41.15 KG/M2 | DIASTOLIC BLOOD PRESSURE: 73 MMHG

## 2024-01-30 DIAGNOSIS — I83.218 VARICOSE VEINS OF RIGHT LOWER EXTREMITY WITH BOTH ULCER OF OTHER PART OF LOWER EXTREMITY AND INFLAMMATION (CODE) (MULTI): Primary | ICD-10-CM

## 2024-01-30 DIAGNOSIS — I87.322 IDIOPATHIC CHRONIC VENOUS HYPERTENSION OF LEFT LEG WITH INFLAMMATION: ICD-10-CM

## 2024-01-30 DIAGNOSIS — I83.12 VARICOSE VEINS OF BOTH LOWER EXTREMITIES WITH INFLAMMATION: ICD-10-CM

## 2024-01-30 DIAGNOSIS — I42.2 HYPERTROPHIC CARDIOMYOPATHY (MULTI): ICD-10-CM

## 2024-01-30 DIAGNOSIS — D71: ICD-10-CM

## 2024-01-30 DIAGNOSIS — I89.0 LYMPHEDEMA: ICD-10-CM

## 2024-01-30 DIAGNOSIS — R29.818 SUSPECTED SLEEP APNEA: ICD-10-CM

## 2024-01-30 DIAGNOSIS — E66.01 OBESITY, CLASS III, BMI 40-49.9 (MORBID OBESITY) (MULTI): ICD-10-CM

## 2024-01-30 DIAGNOSIS — I83.11 VARICOSE VEINS OF BOTH LOWER EXTREMITIES WITH INFLAMMATION: ICD-10-CM

## 2024-01-30 PROBLEM — I44.2 COMPLETE AV BLOCK (MULTI): Status: RESOLVED | Noted: 2023-09-09 | Resolved: 2024-01-30

## 2024-01-30 PROCEDURE — 1160F RVW MEDS BY RX/DR IN RCRD: CPT | Performed by: INTERNAL MEDICINE

## 2024-01-30 PROCEDURE — 3074F SYST BP LT 130 MM HG: CPT | Performed by: INTERNAL MEDICINE

## 2024-01-30 PROCEDURE — 99215 OFFICE O/P EST HI 40 MIN: CPT | Performed by: INTERNAL MEDICINE

## 2024-01-30 PROCEDURE — 1159F MED LIST DOCD IN RCRD: CPT | Performed by: INTERNAL MEDICINE

## 2024-01-30 PROCEDURE — 3078F DIAST BP <80 MM HG: CPT | Performed by: INTERNAL MEDICINE

## 2024-01-30 PROCEDURE — 1036F TOBACCO NON-USER: CPT | Performed by: INTERNAL MEDICINE

## 2024-01-30 PROCEDURE — 1126F AMNT PAIN NOTED NONE PRSNT: CPT | Performed by: INTERNAL MEDICINE

## 2024-01-30 NOTE — PROGRESS NOTES
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  Also during this time patient has been becoming very tired and patient about the year ago found to have complete heart block and patient has had pacemaker..  Since then she is little bit feeling better But continues to have some generalized symptoms and also increasing symptoms in the legs.    Patient recently had a PET scan and I have reviewed the records in detail.  Patient says she has not seen any cardiologist or PCP since then.    Patient has been wearing knee-high graduated compression stockings but very difficult to use them since they were not measured.    Patient is overall tired and also gaining weight.    She is having cramps and leg pain.  She is also has swelling at the end of the day which increases.      ROS:  Respiratory:  Has shortness of breath.  Denies cough, sinus congestion    Cardiovascular:    Denies chest pain.  Denies claudication but activities are limited.  No cyanosis.  At this time palpitations, denies.    Neurologic:    Occasional tingling/Numbness.      Social History:  No tobacco Use:    Current Outpatient Medications on File Prior to Visit   Medication Sig Dispense Refill    amLODIPine (Norvasc) 5 mg tablet Take 1 tablet (5 mg) by mouth once daily.      carvedilol (Coreg) 25 mg tablet Take 2 tablets (50 mg) by mouth 2 times a day.      cloNIDine (Catapres) 0.1 mg tablet Take 1 tablet (0.1 mg) by mouth 3 times a day as needed (systolic blood pressure greater than 170).      furosemide (Lasix) 20 mg tablet Take 1 tablet (20 mg) by mouth once daily.      lisinopril 20 mg tablet Take 0.5 tablets (10 mg) by mouth 2 times a day.       "multivitamin-min-FA-ginkgo (One Daily Women 50 Plus) 400-120 mcg-mg tablet Take 1 tablet by mouth once daily.      simvastatin (Zocor) 20 mg tablet Take 1 tablet (20 mg) by mouth once daily.      vit C/E/zinc/lutein/zeaxanthin (OCUVITE EYE HEALTH ORAL) Take 1 tablet by mouth once daily.      prednisoLONE acetate (Pred-Forte) 1 % ophthalmic suspension Administer 1 drop into the left eye 4 times a day. (Patient taking differently: Administer 1 drop into the left eye 3 times a day.) 5 mL 1     No current facility-administered medications on file prior to visit.        Allergies   Allergen Reactions    Aspirin Unknown        Examination:    Visit Vitals  /73   Pulse 66   Ht 1.651 m (5' 5\")   Wt 112 kg (247 lb)   BMI 41.10 kg/m²   OB Status Postmenopausal   Smoking Status Never   BSA 2.27 m²            Morbid obese, with no apparent distress. Alert oriented  Skin:  Normal turgor.  No rash.  Has moon face and some puffiness in the face  Head:  Normocephalic, atraumatic.  Eyes:  Pupils are equal, round,.  No pallor of conjunctivae.  Mucous membranes are moist  Neck:  Supple.  No JVD.  No carotid bruit.  No thyromegaly. No cervical lymphadenopathy.  No clubbing  Chest:  Vesicular breathing. Bilaterally moderate air entry.  No wheezing.  No crackles.  Heart:  Regular rate and rhythm.  S1, S2 positive.  No murmur.  Abdomen:  Soft and nontender.  Obese, has umbilical hernia noted bowel sounds are positive.  No organomegaly.  Extremities: Chronic lymphedema noted, detailed venous leg exam below.  Bilaterally 2+ dorsalis pedis pulses.  No calf tenderness. Homans sign is negative.  Neuro Exam: No focal signs. Gait is normal.    Venous Exam:  Varicose veins in left calf absent  Varicose veins in left thigh absent  Varicose veins in right calf present  Varicose veins in right thigh absent  Reticular veins in left calf present  Reticular veins in left thigh present  Reticular veins in right calf present  Reticular veins in " right thigh present  Telangiectasias in left calf present  Telangiectasias in left thigh present  Telangiectasias in right calf present  Telangiectasias in right thigh present  Right leg 2+ edema present  Left leg 2+ edema present  Hyperpigmentation in left leg absent  Hyperpigmentation in right leg absent  Lipodermatosclerosis in right leg absent  Lipodermatosclerosis in left leg absent  Dermatitis in left leg absent  Dermatitis in right leg absent  Corona phlebectatica in left leg present).  Corona phlebectatica in right leg present  Atrophe iván in left leg absent  Atrophe iván in right leg absent  Ulcer(s) in left leg absent  Ulcer(s) in right leg absent    CEAP Classification  Right leg CEAP C 4cS Ep  Left leg CEAP C 4cS Ep    Assessment:  Problem List Items Addressed This Visit       Varicose vein of leg    Relevant Orders    Compression Stockings 30-40 mmHg    Vascular US Lower Extremity Vein Mapping Bilateral    Hypertrophic cardiomyopathy (CMS/HCC)    Obesity, Class III, BMI 40-49.9 (morbid obesity) (CMS/HCC)    Relevant Orders    Compression Stockings 30-40 mmHg    Vascular US Lower Extremity Vein Mapping Bilateral    Varicose veins of right lower extremity with both ulcer of other part of lower extremity and inflammation (CODE) (CMS/HCC) - Primary    Relevant Orders    Compression Stockings 30-40 mmHg    Vascular US Lower Extremity Vein Mapping Bilateral    Suspected sleep apnea    Granulomatous disease, chronic (CMS/HCC)    Idiopathic chronic venous hypertension of left leg with inflammation    Relevant Orders    Compression Stockings 30-40 mmHg    Vascular US Lower Extremity Vein Mapping Bilateral    Lymphedema          Plan       1. Varicose veins of bilateral lower extremities with other complications  Start graduated thigh high compression stockings 30 - 40 mm Hg during the wakeup/ day time. Rx GCS today  -Begin 6 weeks trial of conservative therapy with GCS. Trial begins today  -Schedule for  B/L DUS mapping and treatment planning as discussed. Patient Educated with: Varicose Veins and hand out was given to the patient.    #2.  Patient was advised to go back to the PCP and other consults to follow the had the investigations for her possible granulomatous disease which seems to be generalized including lungs and the heart.  This could be sarcoidosis and needs to be have further evaluation.    3.  Patient may be high risk for any procedures of chronic venous insufficiency and also the procedures may be not beneficial if the other underlying problems are not taking care of.    4.  Clinical exam there is lymphedema contributing to the overall patient's leg symptoms.  This will be further reviewed and discussed and treatment options will be planned depending on the patient's venous mapping and also the other investigations regarding the granulomatous disease.    5.  Patient's recent weight gain and morbid obesity including sleep apnea may need to be further addressed to have further improvement in patient's leg symptoms and the leg swellings.    Discussions    1. Varicose veins of leg with pain  Patient has symptoms of chronic venous insufficiency which collaborates with patient's examination as noted above. During this visit, we had a detailed discussion about the pathophysiology of the venous disease and the conservative managements.  We discussed the genetic factors involved in the chronic venous insufficiency and also emphasize the environmental factors such as obesity, lifestyle which could contributes to the worsening of the chronic venous insufficiency. The patient will continue to have the regular activities including walking. The patient also will do all the other conservative management including graduated compression stockings 30 to 40 mmHg thigh-high during the daytime as we prescribed.   We also discussed in details about the importance of complete treatment of the pathophysiology starting from  the venous reflux if it is progressing from the deep junction by endovenous ablation of large truncal superficial veins and then treating the large refluxing tributaries with ultrasound-guided foam sclerotherapy to improve the symptoms and venous return. Handouts were given and patient verbalizes the understanding of our discussions.  The patient will follow up with us in about five week´s time for a detailed ultrasound evaluation which will give us the information about the abnormally refluxing superficial veins. After the study the abnormal venous system will be illustrated in the map form.  The patient will return in about  six weeks for re- evaluation (after insurance required conservative treatment trial) and discussions of the ultrasound evaluation results to decide about any further interventions, which are appropriate to relieve symptoms and prevent future complications from chronic vein disease.    The patient's bilateral venous congestion and leg swellings seem to be significant, but the etiology is multifactorial, which includes the patient's underlying other comorbid conditions including possible granuloma disease and also several other contributory factors, including the patient's obesity, sedentary lifestyle, sleep apnea with probably pulmonary hypertension. I had a lengthy discussion with the patient regarding the conservative management, which includes trying to use the calf pump more frequently and effectively, trying to lose the weight so that decrease the intraabdominal pressure to improve the venous return.     LYMPHEDEMA as per the clinical exam including the presence of stemmer's sign. Discussed the diagnosis in detail. Advised patient to continue to wear compression stockings mainly when she is standing or sitting. Other options which could help discussed are weight loss, regular aerobic exercise, keeping the legs elevated, local hygiene and some OTC as Horse Chest nut seed extract.  Lymph  edema Physical therapy and a specialized treatment by lymphedema therapist is recommended and the referral was given to the patient.    Patient has history of snoring.  As per today's clinical evaluation patient has high risk of having sleep apnea.  We discussed the sleep apnea and the risk of exacerbating her venous insufficiency. Patient will talk to the primary care physician regarding this symptoms and whether needs a sleep study. If there is sleep study identified she understands importance of correcting it to improve and also prevent recurrence of venous insufficiency.    We discussed the recent documentation that the if the BMI is more than 40 the success of venous procedures are limited. I encouraged the patient that he should the try to improve his health by loosing weight if possible prior to the venous procedures. Given the patient's significant obesity with a BMI of 41.10I advised the patient that she should work with the primary care physician and other appropriate consults to lose weight.    Patient Education  RECOMMENDATIONS FOR BETTER LEG CARE  REGULAR EXERCISE  Walking, running, aerobics, swimming, elliptical machine, or biking for 30 minutes, 5 days per week will help reduce aching, pain and tiredness of your legs.  ELEVATE YOUR LEGS  Elevating your legs, heels slightly above chest level for at least 10 minutes, once or twice daily may diminish aching and swelling.  MOVE YOUR LEGS FREQUENTLY  Flexing your ankles will pump blood out of your legs like walking does.  Repeat this every 10 minutes while standing or sitting and try to walk for at least 2 minutes every 1/2 hour.  AVOID WEARING HEELS  Wearing high heels interferes with the normal pumping action that occurs when you walk and may lead to aching and cramping of the legs.  MAINTAIN PROPER WEIGHT  Even moderate weight loss may reduce aching in the legs due to varicose veins and diminish the rate at which spider veins develop.  WEAR SUPPORT  "HOSE  Available at pharmacies and medical supply stores.  There are many brands to choose from.  Lighter support hose are available at department stores.  However, it is best to wear stockings that are \"graduated\".  This will most efficiently improve your vein function.  Moderate Strength: 20-30 mm Hg   Heavy Strength: 30-40 mm Hg (prescription required)  Very Heavy Strength: 40-50 mm Hg (prescription required)  SYMPTOM CONTROL WITH MEDICATIONS  Non-steroidal analgesics (NSAIDS) have been useful in controlling symptoms and reducing inflammation.  If you do not have a contraindication, allergy or intolerance to these medications, small doses may be used to alleviate symptoms.  Advice your practitioner if you have experienced prior adverse effects to these medications as alternative analgesics may be used.  For additional information - go to  [email or web address] and open the patient information tab    Counseling.  The patient was counseled regarding instructions for management, risk factor reductions, prognosis, patient and family education, impressions, risks and benefits of treatment options and importance of compliance with treatment. total time of encounter was 65 minutes and 49 minutes was spent counseling.       "

## 2024-01-30 NOTE — Clinical Note
1. Varicose veins of bilateral lower extremities with other complications Start graduated thigh high compression stockings 30 - 40 mm Hg during the wakeup/ day time. Rx GCS today -Schedule for B/L DUS mapping . #2.  Patient was advised to go back to the PCP and other consults to follow the had the investigations for her possible granulomatous disease which seems to be generalized including lungs and the heart.  This could be sarcoidosis and needs to be have further evaluation. 3.  Patient may be high risk for any procedures of chronic venous insufficiency and also the procedures may be not beneficial if the other underlying problems are not taking care of.  4.  Clinical exam there is lymphedema contributing to the overall patient's leg symptoms.  This will be further reviewed and discussed and treatment options will be planned depending on the patient's venous mapping and also the other investigations regarding the granulomatous disease.

## 2024-02-01 ENCOUNTER — HOSPITAL ENCOUNTER (OUTPATIENT)
Dept: RADIOLOGY | Facility: HOSPITAL | Age: 69
Discharge: HOME | End: 2024-02-01
Payer: COMMERCIAL

## 2024-02-01 DIAGNOSIS — Z00.00 MEDICARE ANNUAL WELLNESS VISIT, SUBSEQUENT: ICD-10-CM

## 2024-02-01 DIAGNOSIS — Z12.31 BREAST CANCER SCREENING BY MAMMOGRAM: ICD-10-CM

## 2024-02-01 PROCEDURE — 77067 SCR MAMMO BI INCL CAD: CPT | Performed by: RADIOLOGY

## 2024-02-01 PROCEDURE — 77063 BREAST TOMOSYNTHESIS BI: CPT | Performed by: RADIOLOGY

## 2024-02-01 PROCEDURE — 77067 SCR MAMMO BI INCL CAD: CPT

## 2024-02-06 ENCOUNTER — OFFICE VISIT (OUTPATIENT)
Dept: PRIMARY CARE | Facility: CLINIC | Age: 69
End: 2024-02-06
Payer: COMMERCIAL

## 2024-02-06 VITALS
BODY MASS INDEX: 41.09 KG/M2 | HEART RATE: 87 BPM | SYSTOLIC BLOOD PRESSURE: 127 MMHG | TEMPERATURE: 97.2 F | WEIGHT: 246.6 LBS | DIASTOLIC BLOOD PRESSURE: 82 MMHG | HEIGHT: 65 IN

## 2024-02-06 DIAGNOSIS — J84.10 GRANULOMATOUS LUNG DISEASE (MULTI): ICD-10-CM

## 2024-02-06 DIAGNOSIS — E66.01 OBESITY, CLASS III, BMI 40-49.9 (MORBID OBESITY) (MULTI): ICD-10-CM

## 2024-02-06 DIAGNOSIS — Z71.2 ENCOUNTER TO DISCUSS TEST RESULTS: Primary | ICD-10-CM

## 2024-02-06 PROCEDURE — 3074F SYST BP LT 130 MM HG: CPT | Performed by: INTERNAL MEDICINE

## 2024-02-06 PROCEDURE — 3079F DIAST BP 80-89 MM HG: CPT | Performed by: INTERNAL MEDICINE

## 2024-02-06 PROCEDURE — 99214 OFFICE O/P EST MOD 30 MIN: CPT | Performed by: INTERNAL MEDICINE

## 2024-02-06 PROCEDURE — 1159F MED LIST DOCD IN RCRD: CPT | Performed by: INTERNAL MEDICINE

## 2024-02-06 PROCEDURE — 1036F TOBACCO NON-USER: CPT | Performed by: INTERNAL MEDICINE

## 2024-02-06 PROCEDURE — 1126F AMNT PAIN NOTED NONE PRSNT: CPT | Performed by: INTERNAL MEDICINE

## 2024-02-06 PROCEDURE — 1160F RVW MEDS BY RX/DR IN RCRD: CPT | Performed by: INTERNAL MEDICINE

## 2024-02-06 ASSESSMENT — PATIENT HEALTH QUESTIONNAIRE - PHQ9
SUM OF ALL RESPONSES TO PHQ9 QUESTIONS 1 AND 2: 0
2. FEELING DOWN, DEPRESSED OR HOPELESS: NOT AT ALL
1. LITTLE INTEREST OR PLEASURE IN DOING THINGS: NOT AT ALL

## 2024-02-06 NOTE — PROGRESS NOTES
"Subjective   Patient ID: Jacquelyn Buenrostro is a 68 y.o. female who presents for Results.    HPI Pt is a pleasant 68 y.o. CF who was seen and evaluated during the FU OV. Pt states that overall she feels fine Pt was recently seen by the vascular team the varicose vein Tx is planning. Pt also had a NM PET CT cardiac sarcoid test done which was suspicious for sarcoidosis of the heart and possible chronic granulomatous disease of the lungs. During the clinical encounter pt denies fever, chills, no SOB, no chest pain/tightness, no abdominal pain, no N/V/D reported, no change of urination reported. Pt denies any other health concerns during this visit.  Sohx: reviewed  PMHx and Fhx: reviewed    Review of Systems  Constitutional: no fever, no chills  Eyes: no eyesight problems, no eye redness, no eye pain, no blurred vision  ENT: no ear pain or sore throat, no nasal discharge or congestion, no sinus pressure, no hearing loss  Cardiovascular: no chest pain or tightness, no SOB, the heart rate was not fast or slow  Respiratory: no cough, no dyspnea with exertion or with rest, no wheezing  Gastrointestinal: no abdominal pain, no constipation or diarrhea, no heartburn, no nausea or vomiting  Genitourinary: no urine frequency, no dysuria, no urinary urgency, no urinary incontinence or retention  Musculoskeletal: no back pain, no arthralgia, no joint swelling or stiffness, no muscle weakness  Integumentary: no skin lesions or rash  Neurological: no headaches, no dizziness or fainting, no limb weakness  Psychiatric: no mood changes, no anxiety or depression, no suicidal thoughts  Endocrine: no weight change, no temperature intolerance, no change of appetite  Hematologic/Lymphatic: no easy bruising or bleeding  Objective   /82 (BP Location: Right arm, Patient Position: Sitting)   Pulse 87   Temp 36.2 °C (97.2 °F)   Ht 1.651 m (5' 5\")   Wt 112 kg (246 lb 9.6 oz)   BMI 41.04 kg/m²     Physical Exam  Constitutional: well " hydrated, well nourished, no acute distress. Vital signs reviewed  Head and face: normocephalic, atraumatic  Eyes: no erythema, edema or visible abnormalities of conjunctiva or lids. Pupils are equal, round and reactive to light. Extraocular movement normal  ENT: external ears without deformities  Neck: full ROM, no adenopathy, neck was supple, thyroid gland not enlarged, no palpable nodules  Pulmonary: normal respiratory rate and effort. Breathing sounds decreased to auscultation due to the body habit  Cardiovascular: RRR, normal S1 and S2, no murmur, no gallop, posterior tib. pulse normal 2+ bilaterally, b/l trace ankle edema  Abdomen: soft, non tender on palpation, not distended, normal BS in all 4 quadrants, no CVA tenderness  Musculoskeletal: no joint swelling, normal movements of all 4 extremities. Gait and station: normal, Digits and nails: normal without clubbing  Skin: normal color, no rash  Neurologic: Cranial nerves: CN II: visual acuity intact. Source: acuity reported by patient. Pupils reactive to light with normal accommodation. Right and left pupil normal CN III, IV and VI: the EO movements were intact. CN VIII: no hearing loss. Deep tendon reflexes: were 2+ and symmetric: R and L triceps, R and L brachioradialis, R and L patella.  Sensory exam: normal light to touch, pain and temperature  Psychiatric: Mood and affect: normal, Alert and Oriented x 3  Lymphatic: no cervical lymphadenopathy  Assessment/Plan   Encounter to discuss the recent test results:  I shared and discussed the recent test results with the pt in details.   Pt states that she has the upcoming visit with the cardiology team to discuss the Pet CT scan abnormalities. Pt verbalized understanding of her possible heart condition and potential risks and even death.  Possible granulomatous lung disease: will provide the referral for the pulmonology team  Pt was also advised to have the BW test as ordered  BMI of 41, consistent with WHO  morbid obesity: please continue the lifestyle modifications  Plan was d/c with the pt in details, verbalized understanding, agreed  Please follow up with PCP as planned or sooner if needed

## 2024-02-08 ENCOUNTER — LAB (OUTPATIENT)
Dept: LAB | Facility: LAB | Age: 69
End: 2024-02-08
Payer: COMMERCIAL

## 2024-02-08 DIAGNOSIS — E78.5 HYPERLIPIDEMIA, UNSPECIFIED HYPERLIPIDEMIA TYPE: ICD-10-CM

## 2024-02-08 DIAGNOSIS — E66.01 OBESITY, CLASS III, BMI 40-49.9 (MORBID OBESITY) (MULTI): ICD-10-CM

## 2024-02-08 DIAGNOSIS — I83.11 VARICOSE VEINS OF BOTH LOWER EXTREMITIES WITH INFLAMMATION: ICD-10-CM

## 2024-02-08 DIAGNOSIS — Z13.1 DIABETES MELLITUS SCREENING: ICD-10-CM

## 2024-02-08 DIAGNOSIS — Z11.59 NEED FOR HEPATITIS C SCREENING TEST: ICD-10-CM

## 2024-02-08 DIAGNOSIS — R94.6 ABNORMAL THYROID FUNCTION TEST: ICD-10-CM

## 2024-02-08 DIAGNOSIS — I83.12 VARICOSE VEINS OF BOTH LOWER EXTREMITIES WITH INFLAMMATION: ICD-10-CM

## 2024-02-08 LAB
ALBUMIN SERPL BCP-MCNC: 4 G/DL (ref 3.4–5)
ALP SERPL-CCNC: 66 U/L (ref 33–136)
ALT SERPL W P-5'-P-CCNC: 10 U/L (ref 7–45)
ANION GAP SERPL CALC-SCNC: 14 MMOL/L (ref 10–20)
AST SERPL W P-5'-P-CCNC: 15 U/L (ref 9–39)
BASOPHILS # BLD AUTO: 0.05 X10*3/UL (ref 0–0.1)
BASOPHILS NFR BLD AUTO: 0.6 %
BILIRUB SERPL-MCNC: 0.5 MG/DL (ref 0–1.2)
BUN SERPL-MCNC: 43 MG/DL (ref 6–23)
CALCIUM SERPL-MCNC: 8.9 MG/DL (ref 8.6–10.3)
CHLORIDE SERPL-SCNC: 104 MMOL/L (ref 98–107)
CHOLEST SERPL-MCNC: 138 MG/DL (ref 0–199)
CHOLESTEROL/HDL RATIO: 2.2
CO2 SERPL-SCNC: 25 MMOL/L (ref 21–32)
CREAT SERPL-MCNC: 1.47 MG/DL (ref 0.5–1.05)
EGFRCR SERPLBLD CKD-EPI 2021: 39 ML/MIN/1.73M*2
EOSINOPHIL # BLD AUTO: 0.23 X10*3/UL (ref 0–0.7)
EOSINOPHIL NFR BLD AUTO: 2.7 %
ERYTHROCYTE [DISTWIDTH] IN BLOOD BY AUTOMATED COUNT: 14.3 % (ref 11.5–14.5)
EST. AVERAGE GLUCOSE BLD GHB EST-MCNC: 123 MG/DL
GLUCOSE SERPL-MCNC: 94 MG/DL (ref 74–99)
HBA1C MFR BLD: 5.9 %
HCT VFR BLD AUTO: 35.1 % (ref 36–46)
HDLC SERPL-MCNC: 61.4 MG/DL
HGB BLD-MCNC: 11.4 G/DL (ref 12–16)
IMM GRANULOCYTES # BLD AUTO: 0.03 X10*3/UL (ref 0–0.7)
IMM GRANULOCYTES NFR BLD AUTO: 0.4 % (ref 0–0.9)
LDLC SERPL CALC-MCNC: 63 MG/DL
LYMPHOCYTES # BLD AUTO: 2.07 X10*3/UL (ref 1.2–4.8)
LYMPHOCYTES NFR BLD AUTO: 24.4 %
MCH RBC QN AUTO: 30.3 PG (ref 26–34)
MCHC RBC AUTO-ENTMCNC: 32.5 G/DL (ref 32–36)
MCV RBC AUTO: 93 FL (ref 80–100)
MONOCYTES # BLD AUTO: 0.65 X10*3/UL (ref 0.1–1)
MONOCYTES NFR BLD AUTO: 7.7 %
NEUTROPHILS # BLD AUTO: 5.44 X10*3/UL (ref 1.2–7.7)
NEUTROPHILS NFR BLD AUTO: 64.2 %
NON HDL CHOLESTEROL: 77 MG/DL (ref 0–149)
NRBC BLD-RTO: 0 /100 WBCS (ref 0–0)
PLATELET # BLD AUTO: 199 X10*3/UL (ref 150–450)
POTASSIUM SERPL-SCNC: 4.6 MMOL/L (ref 3.5–5.3)
PROT SERPL-MCNC: 6.8 G/DL (ref 6.4–8.2)
RBC # BLD AUTO: 3.76 X10*6/UL (ref 4–5.2)
SODIUM SERPL-SCNC: 138 MMOL/L (ref 136–145)
T4 FREE SERPL-MCNC: 0.71 NG/DL (ref 0.61–1.12)
TRIGL SERPL-MCNC: 70 MG/DL (ref 0–149)
TSH SERPL-ACNC: 4.86 MIU/L (ref 0.44–3.98)
VLDL: 14 MG/DL (ref 0–40)
WBC # BLD AUTO: 8.5 X10*3/UL (ref 4.4–11.3)

## 2024-02-08 PROCEDURE — 87522 HEPATITIS C REVRS TRNSCRPJ: CPT

## 2024-02-08 PROCEDURE — 80061 LIPID PANEL: CPT

## 2024-02-08 PROCEDURE — 84443 ASSAY THYROID STIM HORMONE: CPT

## 2024-02-08 PROCEDURE — 85025 COMPLETE CBC W/AUTO DIFF WBC: CPT

## 2024-02-08 PROCEDURE — 83036 HEMOGLOBIN GLYCOSYLATED A1C: CPT

## 2024-02-08 PROCEDURE — 80053 COMPREHEN METABOLIC PANEL: CPT

## 2024-02-08 PROCEDURE — 36415 COLL VENOUS BLD VENIPUNCTURE: CPT

## 2024-02-08 PROCEDURE — 84439 ASSAY OF FREE THYROXINE: CPT

## 2024-02-09 ENCOUNTER — TELEPHONE (OUTPATIENT)
Dept: PRIMARY CARE | Facility: CLINIC | Age: 69
End: 2024-02-09
Payer: COMMERCIAL

## 2024-02-09 LAB
HCV RNA SERPL NAA+PROBE-ACNC: NOT DETECTED K[IU]/ML
HCV RNA SERPL NAA+PROBE-LOG IU: NORMAL {LOG_IU}/ML

## 2024-02-09 NOTE — TELEPHONE ENCOUNTER
T/c to pt, spoke with pt, informed pt of recent lab results. Sent call to  to scheduled follow up appointment within 1-2 weeks

## 2024-02-09 NOTE — TELEPHONE ENCOUNTER
----- Message from Kaye Grey MD sent at 2/9/2024 11:38 AM EST -----  Please, inform the pt that the recent BW showed impaired kidney function. Pt still anemic, but that condition is stable. Thyroid gland function is decreased, but stable. The BG level consistent with pre diabetes. Pt should be seen in the office to discuss the BW within the next 1-2 weeks.  Thank you

## 2024-02-13 ENCOUNTER — OFFICE VISIT (OUTPATIENT)
Dept: PRIMARY CARE | Facility: CLINIC | Age: 69
End: 2024-02-13
Payer: COMMERCIAL

## 2024-02-13 VITALS
BODY MASS INDEX: 40.82 KG/M2 | DIASTOLIC BLOOD PRESSURE: 87 MMHG | WEIGHT: 245 LBS | HEART RATE: 75 BPM | TEMPERATURE: 97.3 F | SYSTOLIC BLOOD PRESSURE: 140 MMHG | HEIGHT: 65 IN

## 2024-02-13 DIAGNOSIS — E78.5 HYPERLIPIDEMIA, UNSPECIFIED HYPERLIPIDEMIA TYPE: ICD-10-CM

## 2024-02-13 DIAGNOSIS — R73.03 PRE-DIABETES: Primary | ICD-10-CM

## 2024-02-13 DIAGNOSIS — D64.9 ANEMIA, UNSPECIFIED TYPE: ICD-10-CM

## 2024-02-13 DIAGNOSIS — I10 HTN (HYPERTENSION), BENIGN: ICD-10-CM

## 2024-02-13 DIAGNOSIS — R79.89 ABNORMAL TSH: ICD-10-CM

## 2024-02-13 DIAGNOSIS — N18.32 CKD STAGE G3B/A2, GFR 30-44 AND ALBUMIN CREATININE RATIO 30-299 MG/G (MULTI): ICD-10-CM

## 2024-02-13 PROBLEM — Z98.890 HISTORY OF SPINAL SURGERY: Status: ACTIVE | Noted: 2024-02-13

## 2024-02-13 PROCEDURE — 1159F MED LIST DOCD IN RCRD: CPT | Performed by: INTERNAL MEDICINE

## 2024-02-13 PROCEDURE — 1036F TOBACCO NON-USER: CPT | Performed by: INTERNAL MEDICINE

## 2024-02-13 PROCEDURE — 1126F AMNT PAIN NOTED NONE PRSNT: CPT | Performed by: INTERNAL MEDICINE

## 2024-02-13 PROCEDURE — 3077F SYST BP >= 140 MM HG: CPT | Performed by: INTERNAL MEDICINE

## 2024-02-13 PROCEDURE — 3079F DIAST BP 80-89 MM HG: CPT | Performed by: INTERNAL MEDICINE

## 2024-02-13 PROCEDURE — 1160F RVW MEDS BY RX/DR IN RCRD: CPT | Performed by: INTERNAL MEDICINE

## 2024-02-13 PROCEDURE — 99215 OFFICE O/P EST HI 40 MIN: CPT | Performed by: INTERNAL MEDICINE

## 2024-02-13 NOTE — PROGRESS NOTES
"Subjective   Patient ID: Jacquelyn Buenrostro is a 68 y.o. female who presents for Results (Lab review).    HPI Pt is a pleasant 68 y.o. CF with extensive PMHX who was seen and evaluated during the FU OV to discuss the recent BW test results in details. Pt did not verbalized the active health concerns during this OV, but she states that she is concerned about \"the recent test results a lot\". During the clinical encounter pt denies fever, chills, no SOB, no chest pain/tightness, no abdominal pain, no N/V/D reported, no change of urination reported. Pt denies any other health concerns during this visit.  Sohx: reviewed  PMHx and Fhx: reviewed    Review of Systems  Constitutional: no fever, no chills  Eyes: no eyesight problems, no eye redness, no eye pain, no blurred vision  ENT: no ear pain or sore throat, no nasal discharge or congestion, no sinus pressure, no hearing loss  Cardiovascular: no chest pain or tightness, no SOB, the heart rate was not fast or slow  Respiratory: no cough, no dyspnea with exertion or with rest, no wheezing  Gastrointestinal: no abdominal pain, no constipation or diarrhea, no heartburn, no nausea or vomiting  Genitourinary: no urine frequency, no dysuria, no urinary urgency, no urinary incontinence or retention  Musculoskeletal: no back pain, no arthralgia, no joint swelling or stiffness, no muscle weakness  Integumentary: no skin lesions or rash  Neurological: no headaches, no dizziness or fainting, no limb weakness  Psychiatric: no mood changes, no anxiety or depression, no suicidal thoughts  Endocrine: no weight change, no temperature intolerance, no change of appetite  Hematologic/Lymphatic: no easy bruising or bleeding    Objective   /87 (BP Location: Right arm, Patient Position: Sitting, BP Cuff Size: Large adult)   Pulse 75   Temp 36.3 °C (97.3 °F)   Ht 1.651 m (5' 5\")   Wt 111 kg (245 lb)   BMI 40.77 kg/m²     Physical Exam  Constitutional: well hydrated, well nourished, no " acute distress. Vital signs reviewed  Head and face: normocephalic, atraumatic  Eyes: no erythema, edema or visible abnormalities of conjunctiva or lids. Pupils are equal, round and reactive to light. Extraocular movement normal  ENT: external ears without deformities  Neck: full ROM, no adenopathy, neck was supple, thyroid gland not enlarged, no palpable nodules  Pulmonary: normal respiratory rate and effort. Lungs are clear to auscultation, no rales,no rhonchi or wheezing  Cardiovascular: RRR, normal S1 and S2, no murmur, no gallop, posterior tib. pulse normal 2+ bilaterally, pt has the compression stocking  Abdomen: soft, non tender on palpation, not distended, normal BS in all 4 quadrants  Musculoskeletal: no joint swelling, normal movements of all 4 extremities. Gait and station: normal, Digits and nails: normal without clubbing  Skin: normal color, no rash  Neurologic: Cranial nerves: CN II: visual acuity intact. Source: acuity reported by patient. Pupils reactive to light with normal accommodation. Right and left pupil normal CN III, IV and VI: the EO movements were intact. CN VIII: no hearing loss.  Sensory exam: normal light to touch  Psychiatric: Mood and affect: normal, Alert and Oriented x 3  Lymphatic: no cervical lymphadenopathy  Assessment/Plan   Pre diabetes: The last HGBa1C level result was discussed with the pt in details. Pt was advised about low carb diet. The referral for the nutritionist team evaluation was provided  Anemia: unspecified. The HGB level remains mildly decreased, but stable. Will recheck CBC, will also check iron panel +Ferritin, Vitamin B12 and Folate level  Abnormal TSH: the level of TSH remains stable. Subclinical hypothyroidism, not required the treatment. Will repeat TSH/T4 in 3 months  CKD stage 3B: will order US of the bladder and kidney: referral for the nephrology team was provided   HLD: well controlled. Continue om simvastatin 20 mg PO daily  HTN: the BP readings  elevated during today visit. Please continue on the BP meds a prescribed. Pt was advised to check BP at home and contact PCP is the BP readings will be above the normal range  Pt was also advised to schedule the visit with the cardiology team to discuss the recent abnormal PET CT cardiac test. Pt will call the cardiology team and schedule the visit  Plan was d/c with the pt in details, verbalized understanding, agreed  Please follow up with PCP as planned or sooner if needed

## 2024-02-15 ENCOUNTER — HOSPITAL ENCOUNTER (OUTPATIENT)
Dept: RADIOLOGY | Facility: HOSPITAL | Age: 69
Discharge: HOME | End: 2024-02-15
Payer: COMMERCIAL

## 2024-02-15 DIAGNOSIS — N18.32 CKD STAGE G3B/A2, GFR 30-44 AND ALBUMIN CREATININE RATIO 30-299 MG/G (MULTI): ICD-10-CM

## 2024-02-15 PROCEDURE — 76770 US EXAM ABDO BACK WALL COMP: CPT | Performed by: RADIOLOGY

## 2024-02-15 PROCEDURE — 76770 US EXAM ABDO BACK WALL COMP: CPT

## 2024-02-19 ENCOUNTER — TELEPHONE (OUTPATIENT)
Dept: PRIMARY CARE | Facility: CLINIC | Age: 69
End: 2024-02-19
Payer: COMMERCIAL

## 2024-02-19 NOTE — TELEPHONE ENCOUNTER
Attempted to reach patient. Left voice mail to call back.   Patient has appt with Dr Castillo 2/20/2024

## 2024-02-19 NOTE — TELEPHONE ENCOUNTER
----- Message from Kaye Grey MD sent at 2/16/2024  4:10 PM EST -----  Please, inform the pt that the recent renal US showed changes which may indicate medical renal disease.  Pt should be seen by the kidney doctor as we discussed during the last OV.  Thank you

## 2024-02-20 ENCOUNTER — OFFICE VISIT (OUTPATIENT)
Dept: NEPHROLOGY | Facility: CLINIC | Age: 69
End: 2024-02-20
Payer: COMMERCIAL

## 2024-02-20 ENCOUNTER — LAB (OUTPATIENT)
Dept: LAB | Facility: LAB | Age: 69
End: 2024-02-20
Payer: COMMERCIAL

## 2024-02-20 VITALS
SYSTOLIC BLOOD PRESSURE: 127 MMHG | HEIGHT: 65 IN | DIASTOLIC BLOOD PRESSURE: 75 MMHG | WEIGHT: 246 LBS | BODY MASS INDEX: 40.98 KG/M2 | HEART RATE: 86 BPM

## 2024-02-20 DIAGNOSIS — N18.32 CKD STAGE G3B/A2, GFR 30-44 AND ALBUMIN CREATININE RATIO 30-299 MG/G (MULTI): ICD-10-CM

## 2024-02-20 DIAGNOSIS — I10 ESSENTIAL HYPERTENSION: ICD-10-CM

## 2024-02-20 DIAGNOSIS — I10 ESSENTIAL HYPERTENSION: Primary | ICD-10-CM

## 2024-02-20 LAB
25(OH)D3 SERPL-MCNC: 38 NG/ML (ref 30–100)
ANION GAP SERPL CALC-SCNC: 14 MMOL/L (ref 10–20)
BUN SERPL-MCNC: 44 MG/DL (ref 6–23)
CALCIUM SERPL-MCNC: 9.9 MG/DL (ref 8.6–10.3)
CHLORIDE SERPL-SCNC: 102 MMOL/L (ref 98–107)
CO2 SERPL-SCNC: 26 MMOL/L (ref 21–32)
CREAT SERPL-MCNC: 1.59 MG/DL (ref 0.5–1.05)
CREAT UR-MCNC: 32.2 MG/DL (ref 20–320)
EGFRCR SERPLBLD CKD-EPI 2021: 35 ML/MIN/1.73M*2
GLUCOSE SERPL-MCNC: 105 MG/DL (ref 74–99)
POTASSIUM SERPL-SCNC: 5 MMOL/L (ref 3.5–5.3)
PROT UR-ACNC: 4 MG/DL (ref 5–24)
PROT/CREAT UR: 0.12 MG/MG CREAT (ref 0–0.17)
SODIUM SERPL-SCNC: 137 MMOL/L (ref 136–145)

## 2024-02-20 PROCEDURE — 83970 ASSAY OF PARATHORMONE: CPT

## 2024-02-20 PROCEDURE — 36415 COLL VENOUS BLD VENIPUNCTURE: CPT

## 2024-02-20 PROCEDURE — 80048 BASIC METABOLIC PNL TOTAL CA: CPT

## 2024-02-20 PROCEDURE — 1126F AMNT PAIN NOTED NONE PRSNT: CPT | Performed by: INTERNAL MEDICINE

## 2024-02-20 PROCEDURE — 3078F DIAST BP <80 MM HG: CPT | Performed by: INTERNAL MEDICINE

## 2024-02-20 PROCEDURE — 1160F RVW MEDS BY RX/DR IN RCRD: CPT | Performed by: INTERNAL MEDICINE

## 2024-02-20 PROCEDURE — 1036F TOBACCO NON-USER: CPT | Performed by: INTERNAL MEDICINE

## 2024-02-20 PROCEDURE — 82570 ASSAY OF URINE CREATININE: CPT

## 2024-02-20 PROCEDURE — 1159F MED LIST DOCD IN RCRD: CPT | Performed by: INTERNAL MEDICINE

## 2024-02-20 PROCEDURE — 82306 VITAMIN D 25 HYDROXY: CPT

## 2024-02-20 PROCEDURE — 99203 OFFICE O/P NEW LOW 30 MIN: CPT | Performed by: INTERNAL MEDICINE

## 2024-02-20 PROCEDURE — 3074F SYST BP LT 130 MM HG: CPT | Performed by: INTERNAL MEDICINE

## 2024-02-20 PROCEDURE — 84156 ASSAY OF PROTEIN URINE: CPT

## 2024-02-20 NOTE — PROGRESS NOTES
Jacquelyn Buenrostro   68 y.o.      Vitals:    02/20/24 1254   Weight: 112 kg (246 lb)      MRN/Room: [id number]/Room/bed info not found      History Of Present Illness  Jacquelyn Buenrostro is a 68 y.o. female presenting with high Cr level.     Past Medical History  She has a past medical history of Arthritis, Encounter for follow-up examination after completed treatment for conditions other than malignant neoplasm (09/07/2022), Hyperlipidemia, Hypertension, Morbid (severe) obesity due to excess calories (CMS/HCC) (08/30/2022), Other conditions influencing health status (08/30/2022), Pacemaker, Personal history of other specified conditions (08/30/2022), and Unspecified open wound, unspecified ankle, initial encounter (08/30/2022).    Surgical History  She has a past surgical history that includes Other surgical history (09/07/2022); Insert / replace / remove pacemaker; Cardiac catheterization; and Cataract extraction w/  intraocular lens implant (Bilateral, 10/11/2023).     Social History  She reports that she has never smoked. She has never been exposed to tobacco smoke. She has never used smokeless tobacco. She reports that she does not currently use alcohol. She reports that she does not use drugs.    Family History  Family History   Problem Relation Name Age of Onset    Cancer Mother      Macular degeneration Mother      Breast cancer Sister  36    Cancer Other Multiple Family Member         Allergies  Aspirin    Meds:     Current Outpatient Medications   Medication Sig Dispense Refill    amLODIPine (Norvasc) 5 mg tablet Take 1 tablet (5 mg) by mouth once daily.      carvedilol (Coreg) 25 mg tablet Take 2 tablets (50 mg) by mouth 2 times a day.      cloNIDine (Catapres) 0.1 mg tablet Take 1 tablet (0.1 mg) by mouth 3 times a day as needed (systolic blood pressure greater than 170).      furosemide (Lasix) 20 mg tablet Take 1 tablet (20 mg) by mouth once daily.      lisinopril 20 mg tablet Take 0.5 tablets (10 mg) by mouth 2  times a day.      multivitamin-min-FA-ginkgo (One Daily Women 50 Plus) 400-120 mcg-mg tablet Take 1 tablet by mouth once daily.      simvastatin (Zocor) 20 mg tablet Take 1 tablet (20 mg) by mouth once daily.      vit C/E/zinc/lutein/zeaxanthin (OCUVITE EYE HEALTH ORAL) Take 1 tablet by mouth once daily.       No current facility-administered medications for this visit.         ROS:  The patient is awake and oriented. No dizziness or lightheadedness. No chills and no fever. No headaches. No nausea and no vomiting. No shortness of breath. No cough. No sputum. No chest pain. No chest tightness. No abdominal pain. No diarrhea and no constipation. No hematemesis or hemoptysis. No hematuria. No rectal bleeding. No melena. No epistaxis. No urinary symptoms. No flank pain. No leg edema. No leg pain. No weakness. No itching. Overall, the rest of the review of systems is also negative.  12 point review of systems otherwise negative as stated in HPI.        Physical Exam:        Vitals:    02/20/24 1254   BP: 127/75   Pulse: 86     General: The patient is awake, oriented, and is not in any distress.  Head and Neck: Normocephalic. No periorbital edema.  Respiratory: Symmetric air entry. Symmetric chest expansion.No respiratory distress.  Cardiovascular: Symmetric peripheral pulses.  Skin: No maculopapular rash.  Musculoskeletal: No peripheral edema in both left and right upper extremities.  No edema in either left or right lower extremities.  Neuro Exam: Speech is fluent. Moves extremities.        Blood Labs:  No results found for this or any previous visit (from the past 24 hour(s)).   Lab Results   Component Value Date    GLUCOSE 94 02/08/2024    CALCIUM 8.9 02/08/2024     02/08/2024    K 4.6 02/08/2024    CO2 25 02/08/2024     02/08/2024    BUN 43 (H) 02/08/2024    CREATININE 1.47 (H) 02/08/2024       Imaging:  === 02/15/24 ===    US RENAL COMPLETE    - Impression -  Mildly increased bilateral parenchymal  echogenicity of the kidneys  which may indicate medical renal disease.    No significant hydronephrosis.    Signed by: Nicolas Ferreira 2/16/2024 4:02 PM  Dictation workstation:   [id number]      Assessment and Plan:  1- CKD III: Last Cr level is 1.4. She has history of HTN. BP is under control. She had a recent kidney US which did not show any major problem. I asked PTH, phosph ,Vit D, UA and spot urine protein to creatinine ratio.    2.  Hypertension.  Blood pressure is under control.  The patient takes clonidine 0.1 mg once a day.  I advised her against taking it.    I will see her in about 2 to 3 weeks for follow-up.    Benito Castillo MD

## 2024-02-21 LAB — PTH-INTACT SERPL-MCNC: 62.5 PG/ML (ref 18.5–88)

## 2024-02-23 ENCOUNTER — TELEPHONE (OUTPATIENT)
Dept: PRIMARY CARE | Facility: CLINIC | Age: 69
End: 2024-02-23
Payer: COMMERCIAL

## 2024-02-23 NOTE — TELEPHONE ENCOUNTER
----- Message from Benito Castillo MD sent at 2/21/2024 12:31 PM EST -----  Kidney function is slightly worse.  I put order to repeat blood work.

## 2024-02-28 NOTE — PROGRESS NOTES
Patient: Jacquelyn Buenrostro    [id number]  : 1955 -- AGE 68 y.o.    Provider: Annabella HULL CNP     Location [facility]   Service Date: 3/1/24            [facility]  New Visit Note      HISTORY OF PRESENT ILLNESS     The patient's referring provider is: Kaye Grey, *    HISTORY OF PRESENT ILLNESS   Jacquelyn Buenrostro is a 68 y.o. female with a history of HTN, HFpEF, High Grade AV Block s/p PPM  who is a never smoker, who presents to a [facility] for an initial  evaluation for granulomatous disease.     I have independently interviewed and examined the patient in the office and reviewed available records.    Current History    On today's visit, the patient reports occasional dry cough. Occasional mild chest discomfort, follows with cardiology. Reports having dizziness after started her BP medications but reports it has been better. She denies vision issues, had cataract surgery in October and November, no more vision issues since. No skin rashes, papules, nodules or plaques. No muscle weakness. Denies wheezing, fevers, chills, WARE, SOB at rest, and ER visits for breathing issues.       # Sarcoidosis Multi-organ involvement evaluation:  1) General: no fever, chills, asthenia, fatigue or weight loss.  2) Neuro: No sign of neurosarcoidosis.  3) Eyes: no hx of uveitis, retinal vascular change or conjunctival nodules. Yearly ophthalmology appointment.  4) ADP: no peripheral adp noted on exam today  5) skin: no hx of erythema nodosum, lupus pernio, papules, nodules, plaques or scar  6) Liver: no transaminitis. Year LFTs.  7) Heart: yearly ECG, and echo  8) Kidneys: Will check Creat, Ca blood and 24hr urine and 1.25OH vitamin D  9) MSK: no muscle weakness  10) GI/: very uncommon involvement, no clinic signs     Previous pulmonary history: He has no history of recurrent infections, or lung disease as a child.  He had no  previous lung hx, never on oxygen or inhaler therapy.     Inhalers/nebulized medications: none    Hospitalization History: He has not been hospitalized over the last year for breathing related problem.    Sleep history: Denies snoring, apnea, feeling tired during the day or taking naps during the day.     ALLERGIES AND MEDICATIONS     ALLERGIES  Allergies   Allergen Reactions    Aspirin Unknown       MEDICATIONS  Current Outpatient Medications   Medication Sig Dispense Refill    amLODIPine (Norvasc) 5 mg tablet Take 1 tablet (5 mg) by mouth once daily.      carvedilol (Coreg) 25 mg tablet Take 2 tablets (50 mg) by mouth 2 times a day.      furosemide (Lasix) 20 mg tablet Take 1 tablet (20 mg) by mouth once daily.      lisinopril 20 mg tablet Take 0.5 tablets (10 mg) by mouth 2 times a day.      multivitamin-min-FA-ginkgo (One Daily Women 50 Plus) 400-120 mcg-mg tablet Take 1 tablet by mouth once daily.      simvastatin (Zocor) 20 mg tablet Take 1 tablet (20 mg) by mouth once daily.      vit C/E/zinc/lutein/zeaxanthin (OCUVITE EYE HEALTH ORAL) Take 1 tablet by mouth once daily.       No current facility-administered medications for this visit.         PAST HISTORY     PAST MEDICAL HISTORY  HTN  HFpEF  High-grade AV block s/p pacemaker    PAST SURGICAL HISTORY  Past Surgical History:   Procedure Laterality Date    CARDIAC CATHETERIZATION      CATARACT EXTRACTION W/  INTRAOCULAR LENS IMPLANT Bilateral 10/11/2023    Dr Amaro    INSERT / REPLACE / REMOVE PACEMAKER      OTHER SURGICAL HISTORY  09/07/2022    Pacemaker insertion       IMMUNIZATION HISTORY  Immunization History   Administered Date(s) Administered    Flu vaccine (IIV4), preservative free *Check age/dose* 10/09/2017, 10/10/2018, 10/12/2020    Flu vaccine, quadrivalent, high-dose, preservative free, age 65y+ (FLUZONE) 11/10/2023    Flu vaccine, quadrivalent, no egg protein, age 6 month or greater (FLUCELVAX) 11/15/2021    Influenza, Seasonal, Quadrivalent,  Adjuvanted 09/30/2022    Influenza, injectable, MDCK, quadrivalent 10/21/2019    Influenza, seasonal, injectable 10/24/2016    Pfizer COVID-19 vaccine, bivalent, age 12 years and older (30 mcg/0.3 mL) 10/20/2022    Pfizer Purple Cap SARS-CoV-2 04/05/2021, 04/30/2021, 12/01/2021    Pneumococcal conjugate vaccine, 20-valent (PREVNAR 20) 12/05/2023    Tdap vaccine, age 7 year and older (BOOSTRIX, ADACEL) 11/15/2022       SOCIAL HISTORY  Tobacco Smoking: never  Illicit drugs: none  Alcohol consumption: none   Pets: 2 cockatiels    OCCUPATIONAL/ENVIRONMENTAL HISTORY  Occupation: Senior Caregiver  No known exposure to asbestos, silica, beryllium or inhaled metals.   Has 2 cockatiels      FAMILY HISTORY  Family History   Problem Relation Name Age of Onset    Cancer Mother      Macular degeneration Mother      Breast cancer Sister  36    Cancer Other Multiple Family Member      No family history of pulmonary disease.  Sister - Breast cancer   Mother- breast cancer  Father - leukemia  Sister - unknown autoimmune disorders.    REVIEW OF SYSTEMS     REVIEW OF SYSTEMS  Review of Systems    Constitutional: No fever, no chills, no night sweats.    Eyes: No double vision, no floaters, no dry eyes.   ENT: See HPI.   Neck: No neck stiffness.  Cardiovascular: No sharp chest pain, no heart racing, no leg swelling.  Respiratory: as noted in HPI.   Gastrointestinal: No nausea, no vomiting, no diarrhea.   Musculoskeletal: No joint pain, no back pain.   Integumentary: No rashes or sores.  Neurological: No dizziness, no headaches. Sleeping well.  Psychiatric: No mood changes.   Endocrine: No hot flashes, no cold intolerance, weight is stable.  Hematologic: No easy bruising or bleeding.    PHYSICAL EXAM     VITAL SIGNS:   Vitals:    03/01/24 1404   BP: 116/76   Pulse: 69   Resp: 18   Temp: 36.8 °C (98.2 °F)   SpO2: 95%            CURRENT WEIGHT: Body mass index is 41.27 kg/m².    PREVIOUS WEIGHTS:  Wt Readings from Last 3 Encounters:    02/20/24 112 kg (246 lb)   02/13/24 111 kg (245 lb)   02/06/24 112 kg (246 lb 9.6 oz)       Physical Exam    Constitutional: General appearance: Alert and oriented.  No acute distress. Well developed, well nourished.  Head and face: Symmetric  ENT: external inspection of ear and nose normal. No intranasal polyps. No oropharyngeal exudates.    Oropharynx: normal   Neck: supple, no lymphadenopathy  Pulmonary: Chest is normal. No increased work of breathing or signs of respiratory distress. Clear to auscultation bilaterally - no crackles, wheezing, or rhonchi.   Cardiovascular: Heart rate and rhythm normal. Normal S1, S2 - no murmurs, gallops, or pericardial rub.   Abdomen: Soft, non tender, +BS  Extremities: No edema. No clubbing or cyanosis of the fingernails.    Neurologic: Moves all four extremities   MSK: Normal movements of extremities. Gait normal   Psychiatric: Intact judgement and insight.    RESULTS/DATA     Pulmonary Function Test Results     None on record    Chest Radiograph     XR chest 2 view 09/02/2022    Narrative  MRN: [id number]  Patient Name: MALKA COKER    STUDY:   CHEST 2 VIEW PA AND LAT;    INDICATION:  S/P pacer .    COMPARISON:  09/01/2022 and 08/30/2022    ACCESSION NUMBER(S):  [id number]    ORDERING CLINICIAN:  KEI VILLANUEVA    FINDINGS:  Left subclavian AICD/pacemaker device noted.  The cardiac silhouette size is within normal limits.  Persistent infiltrates in the right upper and bilateral lower lung  zones.  No pneumothorax.  No acute osseous abnormality.    Impression  Persistent bilateral lung infiltrates with pneumonia not excluded.  Continued imaging follow-up is suggested.      Chest CT Scan     No results found for this or any previous visit from the past 365 days.      Echocardiogram     Echocardiogram     Narrative  [facility] [place]  [address], [place] [address]  Tel [phone] Fax [phone]    TRANSTHORACIC ECHOCARDIOGRAM REPORT      Patient Name:      MALKA Kuo Physician:  [id number] Talat COKER  Study Date:       6/20/2023     Referring           TERENCE OLVERA  Physician:  MRN/PID:          [id number]      PCP:                [id number] Kaye Grey MD  Accession/Order#: [id number]  Department          [facility]  Location:  YOB: 1955    Fellow:  Gender:           F             Nurse:  Admit Date:                     Sonographer:        Lesly Nye [facility]  Admission Status: Outpatient    Additional Staff:  Height:           165.10 cm     CC Report to:  Weight:           105.69 kg     Study Type:         Echocardiogram  BSA:              2.11 m2  Blood Pressure: 137 /83 mmHg    Diagnosis/ICD: I42.2-Other hypertrophic cardiomyopathy  Indication:    HOCM  Procedure/CPT: Echo Complete w Full [id number]    Patient History:  BMI:               Obese >30  Pacer/Defib:       AICD  Pertinent History: HTN and Hyperlipidemia. HOCM; Previous echocardiogram  08-31-22.    Study Detail: The following Echo studies were performed: 2D, M-Mode, Doppler and  color flow.      PHYSICIAN INTERPRETATION:  Left Ventricle: Left ventricular systolic function is normal, with an estimated ejection fraction of 65-70%. There are no regional wall motion abnormalities. The left ventricular cavity size is mildly dilated. The left ventricular septal wall thickness is mildly increased. There is mildly increased left ventricular posterior wall thickness. There is mild concentric left ventricular hypertrophy. Spectral Doppler shows an impaired relaxation pattern of left ventricular diastolic filling.  Left Atrium: The left atrium is mildly dilated. There is no evidence of a patent foramen ovale.  Right Ventricle: The right ventricle is normal in size. There is normal right ventricular global systolic function.  Right Atrium: The right atrium is normal in size.  Aortic Valve: The aortic valve is trileaflet. There is trivial aortic valve regurgitation.  The peak instantaneous gradient of the aortic valve is 13.8 mmHg. The mean gradient of the aortic valve is 8.0 mmHg.  Mitral Valve: The mitral valve is normal in structure. There is mild mitral valve regurgitation.  Tricuspid Valve: The tricuspid valve is structurally normal. There is trace tricuspid regurgitation. The Doppler estimated RVSP is within normal limits at 23.1 mmHg.  Pulmonic Valve: The pulmonic valve is structurally normal. There is no indication of pulmonic valve regurgitation.  Pericardium: There is no pericardial effusion noted.  Aorta: The aortic root is normal.      CONCLUSIONS:  1. Left ventricular systolic function is normal with a 65-70% estimated ejection fraction.  2. Spectral Doppler shows an impaired relaxation pattern of left ventricular diastolic filling.  3. Mild mitral valve regurgitation.  4. RVSP within normal limits.  5. There is no evidence of a patent foramen ovale.    QUANTITATIVE DATA SUMMARY:  2D MEASUREMENTS:  Normal Ranges:  LAs:           4.60 cm    (2.7-4.0cm)  IVSd:          1.34 cm    (0.6-1.1cm)  LVPWd:         1.27 cm    (0.6-1.1cm)  LVIDd:         5.82 cm    (3.9-5.9cm)  LVIDs:         3.10 cm  LV Mass Index: 158.7 g/m2  LV % FS        46.7 %    LA VOLUME:  Normal Ranges:  LA Vol A4C:        63.4 ml    (22+/-6mL/m2)  LA Vol A2C:        74.3 ml  LA Vol BP:         69.7 ml  LA Vol Index A4C:  30.0ml/m2  LA Vol Index A2C:  35.2 ml/m2  LA Vol Index BP:   33.0 ml/m2  LA Area A4C:       21.5 cm2  LA Area A2C:       22.9 cm2  LA Major Axis A4C: 6.2 cm  LA Major Axis A2C: 6.0 cm  LA Volume Index:   34.1 ml/m2  LA Vol A4C:        58.0 ml  LA Vol A2C:        72.0 ml    M-MODE MEASUREMENTS:  Normal Ranges:  Ao Root: 3.40 cm (2.0-3.7cm)    AORTA MEASUREMENTS:  Normal Ranges:  Ao Sinus, d: 3.00 cm (2.1-3.5cm)  Ao STJ, d:   2.90 cm (1.7-3.4cm)  Asc Ao, d:   3.50 cm (2.1-3.4cm)    LV SYSTOLIC FUNCTION BY 2D PLANIMETRY (MOD):  Normal Ranges:  EF-A4C View: 63.0 % (>=55%)  EF-A2C View:  58.2 %  EF-Biplane:  60.0 %    LV DIASTOLIC FUNCTION:  Normal Ranges:  MV Peak E:        0.51 m/s    (0.7-1.2 m/s)  MV Peak A:        0.98 m/s    (0.42-0.7 m/s)  E/A Ratio:        0.52        (1.0-2.2)  MV lateral e'     0.06 m/s  MV medial e'      0.05 m/s  E/e' Ratio:       8.50        (<8.0)  PulmV Sys Rusty:    63.20 cm/s  PulmV Ervin Rusty:   40.30 cm/s  PulmV A Revs Rusty: 25.10 cm/s  PulmV A Revs Dur: 129.00 msec    MITRAL VALVE:  Normal Ranges:  MV Vmax:    0.98 m/s (<=1.3m/s)  MV peak PG: 3.8 mmHg (<5mmHg)  MV mean P.0 mmHg (<48mmHg)  MV DT:      362 msec (150-240msec)    AORTIC VALVE:  Normal Ranges:  AoV Vmax:                1.86 m/s  (<=1.7m/s)  AoV Peak P.8 mmHg (<20mmHg)  AoV Mean P.0 mmHg  (1.7-11.5mmHg)  LVOT Max Rusty:            0.95 m/s  (<=1.1m/s)  AoV VTI:                 42.60 cm  (18-25cm)  LVOT VTI:                21.60 cm  LVOT Diameter:           2.00 cm   (1.8-2.4cm)  AoV Area, VTI:           1.59 cm2  (2.5-5.5cm2)  AoV Area,Vmax:           1.60 cm2  (2.5-4.5cm2)  AoV Dimensionless Index: 0.51      RIGHT VENTRICLE:  RV 1   3.3 cm  RV 2   3.4 cm  RV 3   7.7 cm  TAPSE: 20.0 mm  RV s'  0.14 m/s    TRICUSPID VALVE/RVSP:  Normal Ranges:  Peak TR Velocity: 2.24 m/s  RV Syst Pressure: 23.1 mmHg (< 30mmHg)    PULMONIC VALVE:  Normal Ranges:  PV Accel Time: 82 msec  (>120ms)  PV Max Rusty:    1.1 m/s  (0.6-0.9m/s)  PV Max P.9 mmHg    Pulmonary Veins:  PulmV A Revs Dur: 129.00 msec  PulmV A Revs Rusty: 25.10 cm/s  PulmV Ervin Rusty:   40.30 cm/s  PulmV Sys Rusty:    63.20 cm/s      [id number] Talat Tavares MD  Electronically signed on 2023 at 4:39:58 PM         Labwork   Complete Blood Count  Lab Results   Component Value Date    WBC 8.5 2024    HGB 11.4 (L) 2024    HCT 35.1 (L) 2024    MCV 93 2024     2024       Peripheral Eosinophil Count/Percentage:   Eosinophils Absolute (x10*3/uL)   Date Value   2024 0.23     Eosinophils % (%)  "  Date Value   02/08/2024 2.7       Serum Immunoglobulin E:    No results found for: \"IGE\"     ASSESSMENT/PLAN   Ms. Buenrostro is a 68 y.o. female, with a history of HTN, HFpEF, High Grade AV Block s/p PPM  who is a never smoker, who presents to a [facility] for an initial  evaluation for granulomatous disease.     Will work up for systemic sarcoid   Cardiology will need to workup for myocarditis      Problem List and Orders  Problem List Items Addressed This Visit       Granulomatous lung disease (CMS/HCC) - Primary    Relevant Orders    CT chest wo IV contrast    Complete Pulmonary Function Test Pre/Post Bronchodialator (Spirometry Pre/Post/DLCO/Lung Volumes)    Histoplasma Antibodies    Comprehensive Metabolic Panel       Assessment and Plan / Recommendations:  Problem List Items Addressed This Visit    None     Granulomatous disease vs myocarditis -work up for systemic sarcoid  - Will get CMP and histo antibodies  - Will get CT chest  - Will get PFTs  - Get ophthalmology exam      Follow up in 4-6 weeks or sooner if needed.    If you have any questions please call the office [phone]    Thank you for visiting the Pulmonary clinic today!   Annabella Sullivan CNP  [phone]    "

## 2024-02-29 ENCOUNTER — APPOINTMENT (OUTPATIENT)
Dept: NUTRITION | Facility: CLINIC | Age: 69
End: 2024-02-29
Payer: COMMERCIAL

## 2024-03-01 ENCOUNTER — LAB (OUTPATIENT)
Dept: LAB | Facility: LAB | Age: 69
End: 2024-03-01
Payer: COMMERCIAL

## 2024-03-01 ENCOUNTER — OFFICE VISIT (OUTPATIENT)
Dept: PULMONOLOGY | Facility: CLINIC | Age: 69
End: 2024-03-01
Payer: COMMERCIAL

## 2024-03-01 VITALS
DIASTOLIC BLOOD PRESSURE: 76 MMHG | SYSTOLIC BLOOD PRESSURE: 116 MMHG | RESPIRATION RATE: 18 BRPM | OXYGEN SATURATION: 95 % | HEART RATE: 69 BPM | HEIGHT: 65 IN | WEIGHT: 248 LBS | BODY MASS INDEX: 41.32 KG/M2 | TEMPERATURE: 98.2 F

## 2024-03-01 DIAGNOSIS — J84.10 GRANULOMATOUS LUNG DISEASE (MULTI): ICD-10-CM

## 2024-03-01 DIAGNOSIS — J84.10 GRANULOMATOUS LUNG DISEASE (MULTI): Primary | ICD-10-CM

## 2024-03-01 LAB
ALBUMIN SERPL BCP-MCNC: 4.2 G/DL (ref 3.4–5)
ALP SERPL-CCNC: 71 U/L (ref 33–136)
ALT SERPL W P-5'-P-CCNC: 13 U/L (ref 7–45)
ANION GAP SERPL CALC-SCNC: 12 MMOL/L (ref 10–20)
AST SERPL W P-5'-P-CCNC: 17 U/L (ref 9–39)
BILIRUB SERPL-MCNC: 0.5 MG/DL (ref 0–1.2)
BUN SERPL-MCNC: 29 MG/DL (ref 6–23)
CALCIUM SERPL-MCNC: 9.7 MG/DL (ref 8.6–10.3)
CHLORIDE SERPL-SCNC: 102 MMOL/L (ref 98–107)
CO2 SERPL-SCNC: 28 MMOL/L (ref 21–32)
CREAT SERPL-MCNC: 1.29 MG/DL (ref 0.5–1.05)
EGFRCR SERPLBLD CKD-EPI 2021: 46 ML/MIN/1.73M*2
GLUCOSE SERPL-MCNC: 88 MG/DL (ref 74–99)
POTASSIUM SERPL-SCNC: 5.2 MMOL/L (ref 3.5–5.3)
PROT SERPL-MCNC: 7.3 G/DL (ref 6.4–8.2)
SODIUM SERPL-SCNC: 137 MMOL/L (ref 136–145)

## 2024-03-01 PROCEDURE — 80053 COMPREHEN METABOLIC PANEL: CPT

## 2024-03-01 PROCEDURE — 1159F MED LIST DOCD IN RCRD: CPT

## 2024-03-01 PROCEDURE — 3074F SYST BP LT 130 MM HG: CPT

## 2024-03-01 PROCEDURE — 1036F TOBACCO NON-USER: CPT

## 2024-03-01 PROCEDURE — 36415 COLL VENOUS BLD VENIPUNCTURE: CPT

## 2024-03-01 PROCEDURE — 99214 OFFICE O/P EST MOD 30 MIN: CPT

## 2024-03-01 PROCEDURE — 3078F DIAST BP <80 MM HG: CPT

## 2024-03-01 PROCEDURE — 86698 HISTOPLASMA ANTIBODY: CPT

## 2024-03-01 PROCEDURE — 1160F RVW MEDS BY RX/DR IN RCRD: CPT

## 2024-03-01 PROCEDURE — 1126F AMNT PAIN NOTED NONE PRSNT: CPT

## 2024-03-01 ASSESSMENT — PATIENT HEALTH QUESTIONNAIRE - PHQ9
SUM OF ALL RESPONSES TO PHQ9 QUESTIONS 1 AND 2: 0
1. LITTLE INTEREST OR PLEASURE IN DOING THINGS: NOT AT ALL
2. FEELING DOWN, DEPRESSED OR HOPELESS: NOT AT ALL

## 2024-03-01 ASSESSMENT — COLUMBIA-SUICIDE SEVERITY RATING SCALE - C-SSRS
2. HAVE YOU ACTUALLY HAD ANY THOUGHTS OF KILLING YOURSELF?: NO
1. IN THE PAST MONTH, HAVE YOU WISHED YOU WERE DEAD OR WISHED YOU COULD GO TO SLEEP AND NOT WAKE UP?: NO
6. HAVE YOU EVER DONE ANYTHING, STARTED TO DO ANYTHING, OR PREPARED TO DO ANYTHING TO END YOUR LIFE?: NO

## 2024-03-01 ASSESSMENT — ENCOUNTER SYMPTOMS
OCCASIONAL FEELINGS OF UNSTEADINESS: 0
DEPRESSION: 0
LOSS OF SENSATION IN FEET: 0

## 2024-03-01 NOTE — PATIENT INSTRUCTIONS
Rule out Pulmonary Sarcoid/granulomatous disease  - get blood work  - get CT Chest scan  - get breathing tests (PFTs)  - get an eye exam    Follow up with me in 4-6 weeks    If you have any questions please call the office [phone]    Thank you for visiting the Pulmonary clinic today!   Annabella Sullivan CNP  [phone]

## 2024-03-06 ENCOUNTER — NUTRITION (OUTPATIENT)
Dept: PRIMARY CARE | Facility: CLINIC | Age: 69
End: 2024-03-06
Payer: COMMERCIAL

## 2024-03-06 VITALS — WEIGHT: 248 LBS | HEIGHT: 65 IN | BODY MASS INDEX: 41.32 KG/M2

## 2024-03-06 DIAGNOSIS — N18.32 CKD STAGE G3B/A2, GFR 30-44 AND ALBUMIN CREATININE RATIO 30-299 MG/G (MULTI): ICD-10-CM

## 2024-03-06 DIAGNOSIS — R73.03 PRE-DIABETES: Primary | ICD-10-CM

## 2024-03-06 LAB
H CAPSUL AB SER QL ID: NOT DETECTED
H CAPSUL MYC AB TITR SER CF: NORMAL {TITER}
H CAPSUL YST AB TITR SER CF: NORMAL {TITER}

## 2024-03-06 PROCEDURE — 97802 MEDICAL NUTRITION INDIV IN: CPT | Performed by: DIETITIAN, REGISTERED

## 2024-03-06 NOTE — PROGRESS NOTES
"Nutrition Assessment     Reason for Visit:  Jacquelyn Buenrostro is a 68 y.o. female who presents for Nutrition Counseling, Prediabetes, and Chronic Kidney Disease    Lives with her .   She works as a senior caregiver.     Anthropometrics:  Anthropometrics  Height: 165.1 cm (5' 5\")  Weight: 112 kg (248 lb) (3/1/24)  BMI (Calculated): 41.27  Weight Change  Weight History / % Weight Change: 233-236# in 2022, 239-243# in 2023    Food And Nutrient Intake:  Food and Nutrient History  Food and Nutrient History: She is here to discuss diet for prevention of diabetes. She also is concerned about her kidney health.  Fluid Intake: 8 ounces OJ with medications in the morning, 2 mugs coffee with sweetened cream. Used to drink diet Coke in the past - hasn't had it for years. Drinks only about 16 ounces of water per day. Uses 2% milk, a splash on her cereal, and 8 ounces at dinner.  Food Allergy: none  Food Intolerance: none  GI Symptoms: constipation  GI Symptoms greater than 2 weeks: intermittent  Oral Problems: denies  Dentition: own     Food Intake  Meal 1: B: 8, bowl of Raisin Bran, half of a banana, 2 mugs of coffee - uses flavored creamer (about 1 Tbsp per mug). Sometimes she has a little slice of Setswana (approx 200 kcal)  Meal 2: L: 12-12:30 bowl of canned soup (some are low sodium, others are not), with low sodium crackers. Sometimes has a salad instead, or leftovers (roasted chicken with gravy from the jar, and yogurt)  Meal 3: D: sometimes uses frozen meals ([employer]). Makes homemade meals other nights, uses some prepared/higher sodium foods in the homemade meals such as 1 serving of Kiet flavored rice with meat for dinner. Ice cream or an ice cream bar for dessert.  Snacks: in the afternoon - 2 cups of popcorn (not low sodium) or 1 cookie. She typically doesn't eat after dinner, just drinks water.    Food Preparation  Cooking: Patient (she doesn't add salt to food.)  Grocery Shopping: Patient  Dining Out: 1 to 3 " "times a month    She voiced concerns about additives in cereal in the [place] that are not permitted in [place]. She referred to the additives as \"poison\" and said this information isn't available in mainstream media.     Micronutrient Intake  Vitamin Intake: C, Multivitamin (Ocuvite)    Medication and Complementary/Alternative Medicine Use  Nutrition-Related Complementary/Alternative Medicine Use: melatonin - few times per week    Food And Nutrient Administration:  Diet Experience  Previous Diet / Nutrition Education / Counseling: A dietitian came to her home to talk with her in the past after having a pacemaker placed - discussed diet for blood pressure, 600 mg sodium or less per meal and 2,000 mg sodium or less per day.    She expressed that the diagnosis of kidney disease and pre-diabetes cause her to feel worried.    Also has been referred to specialists and she described it as \"overwhelming.\"      Physical Activities:  Physical Activity  Type of Physical Activity: 1 mile walk outside when the weather is good. Has arthritis/stiffness in her legs/knees - is better in the morning. 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    Energy Needs  Calculated Energy Needs Using Equations  Height: 165.1 cm (5' 5\")  Weight Used for Equation Calculations: 112 kg (248 lb)  [place]- St. Jeor Equation (Overweight or Obese Patients): 1661  Activity Factor: 1.3  Total Energy Needs: 2159.3 (weight maintenance)  Estimated Energy Needs  Total Energy Estimated Needs (kCal): 1759 kCal (to promote weight loss)    Nutrition Diagnosis   Malnutrition Diagnosis  Patient has Malnutrition Diagnosis: No  Nutrition Diagnosis  Patient has Nutrition Diagnosis: Yes  Diagnosis Status (1): New  Nutrition Diagnosis 1: Food and nutrition related knowledge deficit  Related to (1): lack of prior diet education on the topics of diabetes prevention and kidney health  As Evidenced by (1): patient has questions about making changes to her " diet    Nutrition Interventions/Recommendations   Nutrition Prescription  Individualized Nutrition Prescription Provided for : low sodium, moderate protein, moderate potassium, higher non-starchy veggie diet  Food and Nutrition Delivery  Meals & Snacks: Modify Composition of Meals/Snacks, Mineral-modified diet    Patient Instructions   Explained the Plate Method for meal planning with chronic kidney disease:  - 1/4 plate protein food. Explained that limiting high protein foods to 1/4 of the plate is good for the kidney. Including this amount of protein food on the plate can help her feel full. Meat, cheese, fish, nuts, seeds are examples of high protein foods.   - 1/2 plate non-starchy vegetables. These foods are low in sugar, and they also are healthy for the kidney and your blood pressure. Try to eat some non-starchy vegetables at every lunch and dinner. Use the list of lower potassium vegetables to choose best choices, and only eat one of the high potassium vegetables per day.   - 1/4 plate grains, fruit, milk and/or yogurt. These foods put sugar in your blood, and so the portions should not be too big. For example, rice or pasta on the plate should be about the size of a fist, or about 1 cup. Juice also gives you sugar, so it would be best to choose a low sugar variety like the 5 calorie Ocean Spray instead of regular juice. Choose fruits from the low potassium list most of the time, and only eat 1 high potassium fruit per day.     2. Wrote out 4 goals she can focus on:  - Drink more water. Try adding a little lemon juice or slices of cucumber if it makes it more tasty. Try to drink at least 2 16-ounce glasses per day since she was only drinking about 16 ounces per day at baseline.   - Fill up half of the plate at lunch and dinner with non-starchy vegetables. Use the lists of low-potassium vegetables for best choices.   - Swap out orange juice for a lower potassium juice such as apple, grape or cranberry. An  even better choice would be to drink plain water, or try a low-calorie juice like 5 calorie [place] Spray.   - Keep limiting salt by not cooking with it. Try to make more foods from scratch, such as boiling rice in no-salt-added chicken broth instead of the Kiet rice dishes. Use the recipe book and the salad dressing recipes provided for inspiration, or the [employer] website has a lot of good kidney-friendly recipes. Use spices, herbs, vinegar, and lemon juice to give foods flavor instead of using salt.     3. Answered her questions about diabetes in general.   - She had questions about the A1C. Told her that it is normal to get the A1C checked every 3-6 months. Explained that if she starts walking more consistently, cuts out juice, and adds more vegetables, she might see that her A1C goes down a little the next time it gets checked.   - She was worried about the possibility of developing diabetes or having kidney disease progress. Gave her a booklet from the [facility] [place] called the Emotional Side of Diabetes. Encouraged her to focus on the goals that we talked about today. Advised we can have a follow up to keep working on changes.   - Addressed her desire to lose weight. Talked about how losing a relatively small amount of weight can have a positive impact on her blood sugar. Told her that losing 17 pounds would be a 7% weight loss, so if she wants a weight loss target she should aim for about 230 pounds.     4. Talked about her concerns about food additives in the [place] food supply. Advised that eating more home-prepared meals is a way for her to cope with this concern, and it will be even more meaningful to make more home-prepared foods since this can help limit her salt intake. Talked about how some foods in packages are great choices, such as frozen vegetables.     5. She asked about calorie counting. Advised that using the Plate Method can be a more balanced way of meal planning instead of  just focusing on calories, but if she desires to track her calorie intake, she can target about 1700 per day, or she could aim for about 600 per meal 3 times per day.     Nutrition Monitoring and Evaluation   Food/Nutrient Related History Monitoring  Monitoring and Evaluation Plan: Fluid intake, Amount of food, Mineral/element intake, Carbohydrate intake  Fluid Intake: Estimated fluid intake  Criteria: Consume a minimum of 32 ounces of water per day (this isn't an ideal goal, but is an improvement from baseline of 16 ounces/day)  Amount of Food: Types of food  Criteria: She will use the Plate Method to guide the amount & type of food on her plate - specificially she will add non-starchy veggies to her lunches & dinners  Estimated carbohydrate intake: Estimated carbohydrate intake  Criteria: She will try low-sugar juice (or water) instead of regular juice  Mineral/Element Intake: Measured mineral/element intake  Criteria: She will continue to not add salt to food, and will aim to make more items homemade in order to limit sodium intake to <2000 mg/day  Knowledge/Beliefs/Attitudes  Monitoring and Evaluation Plan: Physical activity  Physical activity: Consistency  Criteria: She will aim to be consistent about going out for a walk since it can help her blood sugar level and promote weight loss    Readiness to Change : Good  Level of Understanding : Fair - she could benefit from having follow up(s) to keep up with changing habits  Anticipated Compliant : Good    Follow up: she said she might schedule after her next A1C is checked    Handouts: RPG plate method for carb control, high/low potassium fruit & veg, meal suggestions with CKD, [employer] springtime cookbook, 2 salad dressing recipes, and diabetes booklet

## 2024-03-06 NOTE — PATIENT INSTRUCTIONS
Explained the Plate Method for meal planning with chronic kidney disease:  - 1/4 plate protein food. Explained that limiting high protein foods to 1/4 of the plate is good for the kidney. Including this amount of protein food on the plate can help her feel full. Meat, cheese, fish, nuts, seeds are examples of high protein foods.   - 1/2 plate non-starchy vegetables. These foods are low in sugar, and they also are healthy for the kidney and your blood pressure. Try to eat some non-starchy vegetables at every lunch and dinner. Use the list of lower potassium vegetables to choose best choices, and only eat one of the high potassium vegetables per day.   - 1/4 plate grains, fruit, milk and/or yogurt. These foods put sugar in your blood, and so the portions should not be too big. For example, rice or pasta on the plate should be about the size of a fist, or about 1 cup. Juice also gives you sugar, so it would be best to choose a low sugar variety like the 5 calorie Ocean Spray instead of regular juice. Choose fruits from the low potassium list most of the time, and only eat 1 high potassium fruit per day.     2. Wrote out 4 goals she can focus on:  - Drink more water. Try adding a little lemon juice or slices of cucumber if it makes it more tasty. Try to drink at least 2 16-ounce glasses per day since she was only drinking about 16 ounces per day at baseline.   - Fill up half of the plate at lunch and dinner with non-starchy vegetables. Use the lists of low-potassium vegetables for best choices.   - Swap out orange juice for a lower potassium juice such as apple, grape or cranberry. An even better choice would be to drink plain water, or try a low-calorie juice like 5 calorie [place] Spray.   - Keep limiting salt by not cooking with it. Try to make more foods from scratch, such as boiling rice in no-salt-added chicken broth instead of the Kiet rice dishes. Use the recipe book and the salad dressing recipes provided for  inspiration, or the [employer] website has a lot of good kidney-friendly recipes. Use spices, herbs, vinegar, and lemon juice to give foods flavor instead of using salt.     3. Answered her questions about diabetes in general.   - She had questions about the A1C. Told her that it is normal to get the A1C checked every 3-6 months. Explained that if she starts walking more consistently, cuts out juice, and adds more vegetables, she might see that her A1C goes down a little the next time it gets checked.   - She was worried about the possibility of developing diabetes or having kidney disease progress. Gave her a booklet from the [facility] [place] called the Emotional Side of Diabetes. Encouraged her to focus on the goals that we talked about today. Advised we can have a follow up to keep working on changes.   - Addressed her desire to lose weight. Talked about how losing a relatively small amount of weight can have a positive impact on her blood sugar. Told her that losing 17 pounds would be a 7% weight loss, so if she wants a weight loss target she should aim for about 230 pounds.     4. Talked about her concerns about food additives in the American food supply. Advised that eating more home-prepared meals is a way for her to cope with this concern, and it will be even more meaningful to make more home-prepared foods since this can help limit her salt intake. Talked about how some foods in packages are great choices, such as frozen vegetables.     5. She asked about calorie counting. Advised that using the Plate Method can be a more balanced way of meal planning instead of just focusing on calories, but if she desires to track her calorie intake, she can target about 1700 per day, or she could aim for about 600 per meal 3 times per day.

## 2024-03-07 ENCOUNTER — HOSPITAL ENCOUNTER (OUTPATIENT)
Dept: RESPIRATORY THERAPY | Facility: HOSPITAL | Age: 69
Discharge: HOME | End: 2024-03-07
Payer: COMMERCIAL

## 2024-03-07 DIAGNOSIS — J84.10 GRANULOMATOUS LUNG DISEASE (MULTI): ICD-10-CM

## 2024-03-07 PROCEDURE — 94060 EVALUATION OF WHEEZING: CPT | Performed by: INTERNAL MEDICINE

## 2024-03-07 PROCEDURE — 94726 PLETHYSMOGRAPHY LUNG VOLUMES: CPT

## 2024-03-07 PROCEDURE — 94729 DIFFUSING CAPACITY: CPT | Performed by: INTERNAL MEDICINE

## 2024-03-17 LAB
MGC ASCENT PFT - FEV1 - POST: 1.64
MGC ASCENT PFT - FEV1 - PRE: 1.62
MGC ASCENT PFT - FEV1 - PREDICTED: 2.29
MGC ASCENT PFT - FVC - POST: 2.49
MGC ASCENT PFT - FVC - PRE: 2.43
MGC ASCENT PFT - FVC - PREDICTED: 2.93

## 2024-03-22 ENCOUNTER — HOSPITAL ENCOUNTER (OUTPATIENT)
Dept: CARDIOLOGY | Facility: HOSPITAL | Age: 69
Discharge: HOME | End: 2024-03-22
Payer: COMMERCIAL

## 2024-03-22 ENCOUNTER — HOSPITAL ENCOUNTER (OUTPATIENT)
Dept: RADIOLOGY | Facility: HOSPITAL | Age: 69
Discharge: HOME | End: 2024-03-22
Payer: COMMERCIAL

## 2024-03-22 DIAGNOSIS — Z95.0 PACEMAKER: Primary | ICD-10-CM

## 2024-03-22 DIAGNOSIS — I44.2 ATRIOVENTRICULAR BLOCK, COMPLETE (MULTI): ICD-10-CM

## 2024-03-22 DIAGNOSIS — Z95.0 PACEMAKER: ICD-10-CM

## 2024-03-22 DIAGNOSIS — J84.10 GRANULOMATOUS LUNG DISEASE (MULTI): ICD-10-CM

## 2024-03-22 PROCEDURE — 71250 CT THORAX DX C-: CPT | Performed by: RADIOLOGY

## 2024-03-22 PROCEDURE — 93296 REM INTERROG EVL PM/IDS: CPT

## 2024-03-22 PROCEDURE — 93294 REM INTERROG EVL PM/LDLS PM: CPT | Performed by: INTERNAL MEDICINE

## 2024-03-22 PROCEDURE — 71250 CT THORAX DX C-: CPT

## 2024-03-26 ENCOUNTER — OFFICE VISIT (OUTPATIENT)
Dept: NEPHROLOGY | Facility: CLINIC | Age: 69
End: 2024-03-26
Payer: COMMERCIAL

## 2024-03-26 VITALS
WEIGHT: 247 LBS | DIASTOLIC BLOOD PRESSURE: 85 MMHG | HEIGHT: 65 IN | BODY MASS INDEX: 41.15 KG/M2 | SYSTOLIC BLOOD PRESSURE: 120 MMHG | HEART RATE: 88 BPM

## 2024-03-26 DIAGNOSIS — N18.32 CKD STAGE G3B/A2, GFR 30-44 AND ALBUMIN CREATININE RATIO 30-299 MG/G (MULTI): ICD-10-CM

## 2024-03-26 DIAGNOSIS — I10 ESSENTIAL HYPERTENSION: Primary | ICD-10-CM

## 2024-03-26 PROCEDURE — 99213 OFFICE O/P EST LOW 20 MIN: CPT | Performed by: INTERNAL MEDICINE

## 2024-03-26 PROCEDURE — 3079F DIAST BP 80-89 MM HG: CPT | Performed by: INTERNAL MEDICINE

## 2024-03-26 PROCEDURE — 1159F MED LIST DOCD IN RCRD: CPT | Performed by: INTERNAL MEDICINE

## 2024-03-26 PROCEDURE — 1036F TOBACCO NON-USER: CPT | Performed by: INTERNAL MEDICINE

## 2024-03-26 PROCEDURE — 3074F SYST BP LT 130 MM HG: CPT | Performed by: INTERNAL MEDICINE

## 2024-03-26 PROCEDURE — 1160F RVW MEDS BY RX/DR IN RCRD: CPT | Performed by: INTERNAL MEDICINE

## 2024-03-26 NOTE — PROGRESS NOTES
Jacquelyn Buenrostro   68 y.o.    @@  [facility]/Room: [id number]/Room/bed info not found    Subjective:   The patient is being seen for a routine clinic follow-up of chronic kidney disease. Recently, the disease has been stable. Disease complications:  No hyperkalemia, no hypocalcemia, no hyperphosphatemia, no metabolic acidosis, no coagulopathy, no uremic encephalopathy, no neuropathy and no renal osteodystrophy. The patient is currently asymptomatic. No associated symptoms are reported.       Meds:   Current Outpatient Medications   Medication Sig Dispense Refill    amLODIPine (Norvasc) 5 mg tablet Take 1 tablet (5 mg) by mouth once daily.      carvedilol (Coreg) 25 mg tablet Take 2 tablets (50 mg) by mouth 2 times a day.      furosemide (Lasix) 20 mg tablet Take 1 tablet (20 mg) by mouth once daily.      lisinopril 20 mg tablet Take 0.5 tablets (10 mg) by mouth 2 times a day.      multivitamin-min-FA-ginkgo (One Daily Women 50 Plus) 400-120 mcg-mg tablet Take 1 tablet by mouth once daily.      simvastatin (Zocor) 20 mg tablet Take 1 tablet (20 mg) by mouth once daily.      vit C/E/zinc/lutein/zeaxanthin (OCUVITE EYE HEALTH ORAL) Take 1 tablet by mouth once daily.       No current facility-administered medications for this visit.          ROS:  The patient is awake and oriented. No dizziness or lightheadedness. No chills and no fever. No headaches. No nausea and no vomiting. No shortness of breath. No cough. No sputum. No chest pain. No chest tightness. No abdominal pain. No diarrhea and no constipation. No hematemesis or hemoptysis. No hematuria. No rectal bleeding. No melena. No epistaxis. No urinary symptoms. No flank pain. No leg edema. No leg pain. No weakness. No itching. Overall, the rest of the review of systems is also negative.  12 point review of systems otherwise negative as stated in HPI.        Physical Examination:        Vitals:    03/26/24 1004   BP: 120/85   Pulse: 88     General: The patient is awake,  oriented, and is not in any distress.  Head and Neck: Normocephalic. No periorbital edema.  Eyes: non-icteric  Respiratory: Symmetric air entry. Symmetric chest expansion.No respiratory distress.  Cardiovascular: Symmetric peripheral pulses.  Skin: No maculopapular rash.  Abdomen: soft, nt/nd  Musculoskeletal: No peripheral edema in both left and right upper extremities.  No edema in either left or right lower extremities.  Neuro Exam: Speech is fluent. Moves extremities.    Imaging:  === 02/15/24 ===    US RENAL COMPLETE    - Impression -  Mildly increased bilateral parenchymal echogenicity of the kidneys  which may indicate medical renal disease.    No significant hydronephrosis.    Signed by: Nicolas Ferreira 2/16/2024 4:02 PM  Dictation workstation:   [id number]       Blood Labs:  No results found for this or any previous visit (from the past 24 hour(s)).   Lab Results   Component Value Date    PTH 62.5 02/20/2024    PHOS 3.8 09/03/2022      Lab Results   Component Value Date    GLUCOSE 88 03/01/2024    CALCIUM 9.7 03/01/2024     03/01/2024    K 5.2 03/01/2024    CO2 28 03/01/2024     03/01/2024    BUN 29 (H) 03/01/2024    CREATININE 1.29 (H) 03/01/2024         Assessment and Plan:  1- CKD III: Last Cr level is 1.29.  Stable kidney function.  No proteinuria.  No microscopic hematuria.  Kidney ultrasound was unremarkable.  Normal PTH and vitamin D level.  Normal potassium and bicarb level.     2.  Hypertension.  Blood pressure is under control.  Continue the current medications.     I will see her in about 6 month for follow-up.          Benito Castillo MD  Senior Attending Physician  Director of Onco-Nephrology Program  Division of Nephrology & Hypertension  [facility]

## 2024-03-28 ENCOUNTER — APPOINTMENT (OUTPATIENT)
Dept: PRIMARY CARE | Facility: CLINIC | Age: 69
End: 2024-03-28
Payer: COMMERCIAL

## 2024-03-29 ENCOUNTER — APPOINTMENT (OUTPATIENT)
Dept: NEPHROLOGY | Facility: CLINIC | Age: 69
End: 2024-03-29
Payer: COMMERCIAL

## 2024-04-01 NOTE — PROGRESS NOTES
Patient: Jacquelyn Buenrostro    [id number]  : 1955 -- AGE 68 y.o.    Provider: Annabella HULL CNP     Location [facility]   Service Date: 3/1/24            [facility]  New Visit Note      HISTORY OF PRESENT ILLNESS     The patient's referring provider is: No ref. provider found    HISTORY OF PRESENT ILLNESS   Jacquelyn Buenrostro is a 68 y.o. female with a history of HTN, HFpEF, High Grade AV Block s/p PPM  who is a never smoker, who presents to a [facility] for an initial  evaluation for granulomatous disease.     I have independently interviewed and examined the patient in the office and reviewed available records.    Current History    Since last visit  OLDCART  cough  wheeze  Fevers/chills  WARE  SOB at rest  chest pain  allergies  GERD  ED visits  Up to date on vaccines  Night time  Day time    3/1/24: On today's visit, the patient reports occasional dry cough. Occasional mild chest discomfort, follows with cardiology. Reports having dizziness after started her BP medications but reports it has been better. She denies vision issues, had cataract surgery in October and November, no more vision issues since. No skin rashes, papules, nodules or plaques. No muscle weakness. Denies wheezing, fevers, chills, WARE, SOB at rest, and ER visits for breathing issues.       # Sarcoidosis Multi-organ involvement evaluation:  1) General: no fever, chills, asthenia, fatigue or weight loss.  2) Neuro: No sign of neurosarcoidosis.  3) Eyes: no hx of uveitis, retinal vascular change or conjunctival nodules. Yearly ophthalmology appointment.  4) ADP: no peripheral adp noted on exam today  5) skin: no hx of erythema nodosum, lupus pernio, papules, nodules, plaques or scar  6) Liver: no transaminitis. Year LFTs.  7) Heart: yearly ECG, and echo  8) Kidneys: Will check Creat, Ca blood and 24hr urine and 1.25OH vitamin D  9) MSK: no muscle weakness  10)  GI/: very uncommon involvement, no clinic signs     Previous pulmonary history: He has no history of recurrent infections, or lung disease as a child.  He had no previous lung hx, never on oxygen or inhaler therapy.     Inhalers/nebulized medications: none    Hospitalization History: He has not been hospitalized over the last year for breathing related problem.    Sleep history: Denies snoring, apnea, feeling tired during the day or taking naps during the day.     ALLERGIES AND MEDICATIONS     ALLERGIES  Allergies   Allergen Reactions    Aspirin Unknown       MEDICATIONS  Current Outpatient Medications   Medication Sig Dispense Refill    amLODIPine (Norvasc) 5 mg tablet Take 1 tablet (5 mg) by mouth once daily.      carvedilol (Coreg) 25 mg tablet Take 2 tablets (50 mg) by mouth 2 times a day.      furosemide (Lasix) 20 mg tablet Take 1 tablet (20 mg) by mouth once daily.      lisinopril 20 mg tablet Take 0.5 tablets (10 mg) by mouth 2 times a day.      multivitamin-min-FA-ginkgo (One Daily Women 50 Plus) 400-120 mcg-mg tablet Take 1 tablet by mouth once daily.      simvastatin (Zocor) 20 mg tablet Take 1 tablet (20 mg) by mouth once daily.      vit C/E/zinc/lutein/zeaxanthin (OCUVITE EYE HEALTH ORAL) Take 1 tablet by mouth once daily.       No current facility-administered medications for this visit.         PAST HISTORY     PAST MEDICAL HISTORY  HTN  HFpEF  High-grade AV block s/p pacemaker    PAST SURGICAL HISTORY  Past Surgical History:   Procedure Laterality Date    CARDIAC CATHETERIZATION      CATARACT EXTRACTION W/  INTRAOCULAR LENS IMPLANT Bilateral 10/11/2023    Dr Amaro    INSERT / REPLACE / REMOVE PACEMAKER      OTHER SURGICAL HISTORY  09/07/2022    Pacemaker insertion       IMMUNIZATION HISTORY  Immunization History   Administered Date(s) Administered    Flu vaccine (IIV4), preservative free *Check age/dose* 10/09/2017, 10/10/2018, 10/12/2020    Flu vaccine, quadrivalent, high-dose, preservative free,  age 65y+ (FLUZONE) 11/10/2023    Flu vaccine, quadrivalent, no egg protein, age 6 month or greater (FLUCELVAX) 11/15/2021    Influenza, Seasonal, Quadrivalent, Adjuvanted 09/30/2022    Influenza, injectable, MDCK, quadrivalent 10/21/2019    Influenza, seasonal, injectable 10/24/2016    Pfizer COVID-19 vaccine, bivalent, age 12 years and older (30 mcg/0.3 mL) 10/20/2022    Pfizer Purple Cap SARS-CoV-2 04/05/2021, 04/30/2021, 12/01/2021    Pneumococcal conjugate vaccine, 20-valent (PREVNAR 20) 12/05/2023    Tdap vaccine, age 7 year and older (BOOSTRIX, ADACEL) 11/15/2022       SOCIAL HISTORY  Tobacco Smoking: never  Illicit drugs: none  Alcohol consumption: none   Pets: 2 cockatiels    OCCUPATIONAL/ENVIRONMENTAL HISTORY  Occupation: Senior Caregiver  No known exposure to asbestos, silica, beryllium or inhaled metals.   Has 2 cockatiels      FAMILY HISTORY  Family History   Problem Relation Name Age of Onset    Cancer Mother      Macular degeneration Mother      Breast cancer Sister  36    Cancer Other Multiple Family Member      No family history of pulmonary disease.  Sister - Breast cancer   Mother- breast cancer  Father - leukemia  Sister - unknown autoimmune disorders.    REVIEW OF SYSTEMS     REVIEW OF SYSTEMS  Review of Systems    Constitutional: No fever, no chills, no night sweats.    Eyes: No double vision, no floaters, no dry eyes.   ENT: See HPI.   Neck: No neck stiffness.  Cardiovascular: No sharp chest pain, no heart racing, no leg swelling.  Respiratory: as noted in HPI.   Gastrointestinal: No nausea, no vomiting, no diarrhea.   Musculoskeletal: No joint pain, no back pain.   Integumentary: No rashes or sores.  Neurological: No dizziness, no headaches. Sleeping well.  Psychiatric: No mood changes.   Endocrine: No hot flashes, no cold intolerance, weight is stable.  Hematologic: No easy bruising or bleeding.    PHYSICAL EXAM     VITAL SIGNS:   There were no vitals filed for this visit.           CURRENT  WEIGHT: There is no height or weight on file to calculate BMI.    PREVIOUS WEIGHTS:  Wt Readings from Last 3 Encounters:   03/26/24 112 kg (247 lb)   03/06/24 112 kg (248 lb)   03/01/24 112 kg (248 lb)       Physical Exam    Constitutional: General appearance: Alert and oriented.  No acute distress. Well developed, well nourished.  Head and face: Symmetric  ENT: external inspection of ear and nose normal. No intranasal polyps. No oropharyngeal exudates.    Oropharynx: normal   Neck: supple, no lymphadenopathy  Pulmonary: Chest is normal. No increased work of breathing or signs of respiratory distress. Clear to auscultation bilaterally - no crackles, wheezing, or rhonchi.   Cardiovascular: Heart rate and rhythm normal. Normal S1, S2 - no murmurs, gallops, or pericardial rub.   Abdomen: Soft, non tender, +BS  Extremities: No edema. No clubbing or cyanosis of the fingernails.    Neurologic: Moves all four extremities   MSK: Normal movements of extremities. Gait normal   Psychiatric: Intact judgement and insight.    RESULTS/DATA     Pulmonary Function Test Results         Narrative & Impression   The FEV1/FVC is normal. The FEV1 is normal. The FVC is normal. Following administration of bronchodilators, there is no significant response. The DLCO is normal; however, the diffusing capacity was not corrected for the patient's hemoglobin. Conclusion: Normal spirometry. The DLCO is normal.                               Chest Radiograph     XR chest 2 view 09/02/2022    Narrative  MRN: [id number]  Patient Name: MALKA COKER    STUDY:   CHEST 2 VIEW PA AND LAT;    INDICATION:  S/P pacer .    COMPARISON:  09/01/2022 and 08/30/2022    ACCESSION NUMBER(S):  [id number]    ORDERING CLINICIAN:  KEI VILLANUEVA    FINDINGS:  Left subclavian AICD/pacemaker device noted.  The cardiac silhouette size is within normal limits.  Persistent infiltrates in the right upper and bilateral lower lung  zones.  No pneumothorax.  No acute osseous  abnormality.    Impression  Persistent bilateral lung infiltrates with pneumonia not excluded.  Continued imaging follow-up is suggested.      Chest CT Scan     Study Result    Narrative & Impression   Interpreted By:  Jono Ernst and Marshall Colin   STUDY:  CT CHEST WO IV CONTRAST;  3/22/2024 11:11 am      INDICATION:  Signs/Symptoms:sarcoid.      COMPARISON:  PET-CT dated 01/18/2024.      ACCESSION NUMBER(S):  [id number]      ORDERING CLINICIAN:  DOMI LOPEZ      TECHNIQUE:  Helical data acquisition of the chest was obtained  without IV  contrast material.  Images were reformatted in axial, coronal, and  sagittal planes.      FINDINGS:  LUNGS AND AIRWAYS:  The trachea and central airways are patent. No endobronchial lesion.      There is mosaic attenuation of the bilateral lungs, likely relating  to small airway disease. There are reticular and bandlike opacities  scattered throughout the bilateral lungs likely reflecting a  combination of subsegmental atelectasis and/or fibrosis. There is  biapical pleuroparenchymal scarring.      There scattered areas of tree-in-bud nodularity predominantly  visualized within the right middle lobe and right upper lobe as  annotated in PACS. No focal consolidation, pleural effusion, or  pneumothorax. No discrete suspicious solid pulmonary nodules.      MEDIASTINUM AND NEVAEH, LOWER NECK AND AXILLA:  The visualized thyroid gland is within normal limits.      There are numerous calcified mediastinal and hilar lymph nodes likely  reflecting sequela of prior granulomatous infection. No thoracic  lymphadenopathy by CT size criteria.      Unchanged small hiatal hernia. The esophagus is otherwise  unremarkable in appearance.      HEART AND VESSELS:  The thoracic aorta is of normal course and caliber with mild vascular  calcifications.      Main pulmonary artery and its branches are normal in caliber.      No coronary artery calcifications are seen. The study is not  optimized  for evaluation of coronary arteries.      The cardiac chambers are not enlarged. Stable positioning of a left  chest wall AICD/pacemaker with leads terminating in the right atrium  and right ventricle. Coarse mitral annular calcifications are noted.      No evidence of pericardial effusion.      UPPER ABDOMEN:  The visualized subdiaphragmatic structures demonstrate no remarkable  findings.      CHEST WALL AND OSSEOUS STRUCTURES:  The chest wall soft tissues are unremarkable. No acute osseous  abnormalities or suspicious osseous lesions.      IMPRESSION:  1. Scattered areas of tree-in-bud nodularity predominantly visualized  within the right middle lobe and right upper lobe. Findings are  compatible with an infectious/inflammatory bronchiolitis.  2. Mosaic attenuation of the bilateral lungs likely reflects sequela  of small airway disease.  3. Calcified mediastinal and hilar lymph nodes compatible with  sequela of prior granulomatous infection.  4. Unchanged small hiatal hernia.      I personally reviewed the images/study and I agree with the resident  findings as stated. This study was interpreted at [place], [place].      MACRO:  None      Signed by: Jono Ernst 3/23/2024 1:01 PM         Echocardiogram     Echocardiogram     Narrative  [facility]  [address] [address]  Tel [phone] Fax [phone]    TRANSTHORACIC ECHOCARDIOGRAM REPORT      Patient Name:     Blunt      Ayaan Physician:  [id number] Talat Tavares MD  [facility]  Study Date:       6/20/2023     Referring           TERENCE OLVERA  Physician:  MRN/PID:          [id number]      PCP:                [id number] Kaye Grey MD  Accession/Order#: [id number]  Department          [facility]  Location:  YOB: 1955    Fellow:  Gender:           F             Nurse:  Admit Date:                     Sonographer:        Lesly Nye [facility]  Admission Status:  Outpatient    Additional Staff:  Height:           165.10 cm      Report to:  Weight:           105.69 kg     Study Type:         Echocardiogram  BSA:              2.11 m2  Blood Pressure: 137 /83 mmHg    Diagnosis/ICD: I42.2-Other hypertrophic cardiomyopathy  Indication:    HOCM  Procedure/CPT: Echo Complete w Full [id number]    Patient History:  BMI:               Obese >30  Pacer/Defib:       AICD  Pertinent History: HTN and Hyperlipidemia. HOCM; Previous echocardiogram  08-31-22.    Study Detail: The following Echo studies were performed: 2D, M-Mode, Doppler and  color flow.      PHYSICIAN INTERPRETATION:  Left Ventricle: Left ventricular systolic function is normal, with an estimated ejection fraction of 65-70%. There are no regional wall motion abnormalities. The left ventricular cavity size is mildly dilated. The left ventricular septal wall thickness is mildly increased. There is mildly increased left ventricular posterior wall thickness. There is mild concentric left ventricular hypertrophy. Spectral Doppler shows an impaired relaxation pattern of left ventricular diastolic filling.  Left Atrium: The left atrium is mildly dilated. There is no evidence of a patent foramen ovale.  Right Ventricle: The right ventricle is normal in size. There is normal right ventricular global systolic function.  Right Atrium: The right atrium is normal in size.  Aortic Valve: The aortic valve is trileaflet. There is trivial aortic valve regurgitation. The peak instantaneous gradient of the aortic valve is 13.8 mmHg. The mean gradient of the aortic valve is 8.0 mmHg.  Mitral Valve: The mitral valve is normal in structure. There is mild mitral valve regurgitation.  Tricuspid Valve: The tricuspid valve is structurally normal. There is trace tricuspid regurgitation. The Doppler estimated RVSP is within normal limits at 23.1 mmHg.  Pulmonic Valve: The pulmonic valve is structurally normal. There is no indication of pulmonic  valve regurgitation.  Pericardium: There is no pericardial effusion noted.  Aorta: The aortic root is normal.      CONCLUSIONS:  1. Left ventricular systolic function is normal with a 65-70% estimated ejection fraction.  2. Spectral Doppler shows an impaired relaxation pattern of left ventricular diastolic filling.  3. Mild mitral valve regurgitation.  4. RVSP within normal limits.  5. There is no evidence of a patent foramen ovale.    QUANTITATIVE DATA SUMMARY:  2D MEASUREMENTS:  Normal Ranges:  LAs:           4.60 cm    (2.7-4.0cm)  IVSd:          1.34 cm    (0.6-1.1cm)  LVPWd:         1.27 cm    (0.6-1.1cm)  LVIDd:         5.82 cm    (3.9-5.9cm)  LVIDs:         3.10 cm  LV Mass Index: 158.7 g/m2  LV % FS        46.7 %    LA VOLUME:  Normal Ranges:  LA Vol A4C:        63.4 ml    (22+/-6mL/m2)  LA Vol A2C:        74.3 ml  LA Vol BP:         69.7 ml  LA Vol Index A4C:  30.0ml/m2  LA Vol Index A2C:  35.2 ml/m2  LA Vol Index BP:   33.0 ml/m2  LA Area A4C:       21.5 cm2  LA Area A2C:       22.9 cm2  LA Major Axis A4C: 6.2 cm  LA Major Axis A2C: 6.0 cm  LA Volume Index:   34.1 ml/m2  LA Vol A4C:        58.0 ml  LA Vol A2C:        72.0 ml    M-MODE MEASUREMENTS:  Normal Ranges:  Ao Root: 3.40 cm (2.0-3.7cm)    AORTA MEASUREMENTS:  Normal Ranges:  Ao Sinus, d: 3.00 cm (2.1-3.5cm)  Ao STJ, d:   2.90 cm (1.7-3.4cm)  Asc Ao, d:   3.50 cm (2.1-3.4cm)    LV SYSTOLIC FUNCTION BY 2D PLANIMETRY (MOD):  Normal Ranges:  EF-A4C View: 63.0 % (>=55%)  EF-A2C View: 58.2 %  EF-Biplane:  60.0 %    LV DIASTOLIC FUNCTION:  Normal Ranges:  MV Peak E:        0.51 m/s    (0.7-1.2 m/s)  MV Peak A:        0.98 m/s    (0.42-0.7 m/s)  E/A Ratio:        0.52        (1.0-2.2)  MV lateral e'     0.06 m/s  MV medial e'      0.05 m/s  E/e' Ratio:       8.50        (<8.0)  PulmV Sys Rusty:    63.20 cm/s  PulmV Ervin Rusty:   40.30 cm/s  PulmV A Revs Rusty: 25.10 cm/s  PulmV A Revs Dur: 129.00 msec    MITRAL VALVE:  Normal Ranges:  MV Vmax:    0.98 m/s  "(<=1.3m/s)  MV peak PG: 3.8 mmHg (<5mmHg)  MV mean P.0 mmHg (<48mmHg)  MV DT:      362 msec (150-240msec)    AORTIC VALVE:  Normal Ranges:  AoV Vmax:                1.86 m/s  (<=1.7m/s)  AoV Peak P.8 mmHg (<20mmHg)  AoV Mean P.0 mmHg  (1.7-11.5mmHg)  LVOT Max Rusty:            0.95 m/s  (<=1.1m/s)  AoV VTI:                 42.60 cm  (18-25cm)  LVOT VTI:                21.60 cm  LVOT Diameter:           2.00 cm   (1.8-2.4cm)  AoV Area, VTI:           1.59 cm2  (2.5-5.5cm2)  AoV Area,Vmax:           1.60 cm2  (2.5-4.5cm2)  AoV Dimensionless Index: 0.51      RIGHT VENTRICLE:  RV 1   3.3 cm  RV 2   3.4 cm  RV 3   7.7 cm  TAPSE: 20.0 mm  RV s'  0.14 m/s    TRICUSPID VALVE/RVSP:  Normal Ranges:  Peak TR Velocity: 2.24 m/s  RV Syst Pressure: 23.1 mmHg (< 30mmHg)    PULMONIC VALVE:  Normal Ranges:  PV Accel Time: 82 msec  (>120ms)  PV Max Rusty:    1.1 m/s  (0.6-0.9m/s)  PV Max P.9 mmHg    Pulmonary Veins:  PulmV A Revs Dur: 129.00 msec  PulmV A Revs Rusty: 25.10 cm/s  PulmV Ervin Rusty:   40.30 cm/s  PulmV Sys Rusty:    63.20 cm/s      [id number] Talat Tavares MD  Electronically signed on 2023 at 4:39:58 PM         Labwork   Complete Blood Count  Lab Results   Component Value Date    WBC 8.5 2024    HGB 11.4 (L) 2024    HCT 35.1 (L) 2024    MCV 93 2024     2024       Peripheral Eosinophil Count/Percentage:   Eosinophils Absolute (x10*3/uL)   Date Value   2024 0.23     Eosinophils % (%)   Date Value   2024 2.7       Serum Immunoglobulin E:    No results found for: \"IGE\"     ASSESSMENT/PLAN   Ms. Buenrostro is a 68 y.o. female, with a history of HTN, HFpEF, High Grade AV Block s/p PPM  who is a never smoker, who presents to a [facility] for an initial  evaluation for granulomatous disease.     Will work up for systemic sarcoid   Cardiology will need to workup for myocarditis      Problem List and Orders  Problem " List Items Addressed This Visit    None    Assessment and Plan / Recommendations:  Problem List Items Addressed This Visit    None     Granulomatous disease vs myocarditis -work up for systemic sarcoid  - Will get CMP and histo antibodies  - Will get CT chest  - Will get PFTs  - Get ophthalmology exam      Follow up in 4-6 weeks or sooner if needed.    If you have any questions please call the office [phone]    Thank you for visiting the Pulmonary clinic today!   Annabella Sullivan CNP  [phone]

## 2024-04-04 ENCOUNTER — APPOINTMENT (OUTPATIENT)
Dept: PRIMARY CARE | Facility: CLINIC | Age: 69
End: 2024-04-04
Payer: COMMERCIAL

## 2024-04-10 ENCOUNTER — HOSPITAL ENCOUNTER (EMERGENCY)
Facility: HOSPITAL | Age: 69
Discharge: HOME | End: 2024-04-10
Attending: EMERGENCY MEDICINE
Payer: COMMERCIAL

## 2024-04-10 ENCOUNTER — APPOINTMENT (OUTPATIENT)
Dept: RADIOLOGY | Facility: HOSPITAL | Age: 69
End: 2024-04-10
Payer: COMMERCIAL

## 2024-04-10 VITALS
SYSTOLIC BLOOD PRESSURE: 150 MMHG | HEART RATE: 85 BPM | TEMPERATURE: 97 F | HEIGHT: 65 IN | BODY MASS INDEX: 41.15 KG/M2 | DIASTOLIC BLOOD PRESSURE: 72 MMHG | OXYGEN SATURATION: 99 % | WEIGHT: 247 LBS | RESPIRATION RATE: 16 BRPM

## 2024-04-10 DIAGNOSIS — S09.90XA HEAD INJURY, INITIAL ENCOUNTER: Primary | ICD-10-CM

## 2024-04-10 PROCEDURE — 99285 EMERGENCY DEPT VISIT HI MDM: CPT | Mod: 25

## 2024-04-10 PROCEDURE — 72125 CT NECK SPINE W/O DYE: CPT | Performed by: RADIOLOGY

## 2024-04-10 PROCEDURE — 72125 CT NECK SPINE W/O DYE: CPT

## 2024-04-10 PROCEDURE — 70450 CT HEAD/BRAIN W/O DYE: CPT

## 2024-04-10 PROCEDURE — 76377 3D RENDER W/INTRP POSTPROCES: CPT

## 2024-04-10 PROCEDURE — 70486 CT MAXILLOFACIAL W/O DYE: CPT | Performed by: RADIOLOGY

## 2024-04-10 PROCEDURE — 99284 EMERGENCY DEPT VISIT MOD MDM: CPT | Performed by: EMERGENCY MEDICINE

## 2024-04-10 PROCEDURE — 76377 3D RENDER W/INTRP POSTPROCES: CPT | Performed by: RADIOLOGY

## 2024-04-10 PROCEDURE — 70486 CT MAXILLOFACIAL W/O DYE: CPT

## 2024-04-10 PROCEDURE — 70450 CT HEAD/BRAIN W/O DYE: CPT | Performed by: RADIOLOGY

## 2024-04-10 RX ORDER — ACETAMINOPHEN 325 MG/1
650 TABLET ORAL ONCE
Status: COMPLETED | OUTPATIENT
Start: 2024-04-10 | End: 2024-04-10

## 2024-04-10 RX ADMIN — ACETAMINOPHEN 650 MG: 325 TABLET ORAL at 15:17

## 2024-04-10 ASSESSMENT — COLUMBIA-SUICIDE SEVERITY RATING SCALE - C-SSRS
1. IN THE PAST MONTH, HAVE YOU WISHED YOU WERE DEAD OR WISHED YOU COULD GO TO SLEEP AND NOT WAKE UP?: NO
6. HAVE YOU EVER DONE ANYTHING, STARTED TO DO ANYTHING, OR PREPARED TO DO ANYTHING TO END YOUR LIFE?: NO
2. HAVE YOU ACTUALLY HAD ANY THOUGHTS OF KILLING YOURSELF?: NO

## 2024-04-10 ASSESSMENT — LIFESTYLE VARIABLES
HAVE YOU EVER FELT YOU SHOULD CUT DOWN ON YOUR DRINKING: NO
EVER HAD A DRINK FIRST THING IN THE MORNING TO STEADY YOUR NERVES TO GET RID OF A HANGOVER: NO
EVER FELT BAD OR GUILTY ABOUT YOUR DRINKING: NO
TOTAL SCORE: 0
HAVE PEOPLE ANNOYED YOU BY CRITICIZING YOUR DRINKING: NO

## 2024-04-10 ASSESSMENT — PAIN DESCRIPTION - LOCATION: LOCATION: HEAD

## 2024-04-10 ASSESSMENT — PAIN - FUNCTIONAL ASSESSMENT: PAIN_FUNCTIONAL_ASSESSMENT: 0-10

## 2024-04-10 ASSESSMENT — PAIN DESCRIPTION - ORIENTATION: ORIENTATION: LEFT

## 2024-04-10 ASSESSMENT — PAIN SCALES - GENERAL
PAINLEVEL_OUTOF10: 4
PAINLEVEL_OUTOF10: 2
PAINLEVEL_OUTOF10: 8

## 2024-04-10 ASSESSMENT — PAIN DESCRIPTION - PAIN TYPE: TYPE: ACUTE PAIN

## 2024-04-10 NOTE — DISCHARGE INSTRUCTIONS
Seek immediate medical attention if you develop: new or worsening headache, nausea, vomiting, confusion, weakness, loss of motion in your arms or legs, loss of control of your urine or stool, difficulty waking from sleep, or any new or worsening symptoms.    Have someone check on you and wake you from sleep every 2 hours for the next 24 hours to make sure that you are doing well.    Seek immediate medical attention if you develop: increasing pain, numbness, tingling, weakness, loss of motion in your arms or legs, loss of control of your urine or stool, fever, abdominal pain, chest pain, shortness of breath, or any new or worsening symptoms.

## 2024-04-10 NOTE — ED PROVIDER NOTES
EMERGENCY DEPARTMENT ENCOUNTER      Pt Name: Jacquelyn Buenrostro  MRN: [id number]  Birthdate 1955  Date of evaluation: 4/10/2024    HISTORY OF PRESENT ILLNESS    Jacquelyn Buenrostro is an 68 y.o. female with history including CHF, HLD, HTN, pacemaker for sick sinus syndrome presenting to the emergency department for Mechanical fall.  Patient states she was walking through her neighborhood when she did not realize the sidewalk was uneven.  Patient fell forward and hit her head on the cement sidewalk.  Patient did not lose consciousness and is not on any blood thinners.  She did have a nosebleed that lasted for few seconds.  Patient denies any vision changes she does have a mild headache.  Patient was able to ambulate afterwards and states that she feels like she is at her baseline.  One of the neighbors nicolette saw her and saw the incident happened and stated he would drive her here.  She denied any chest pain or feeling lightheaded prior to the fall.    Limitations to History: none  Additional History Obtained from: n/a    PAST MEDICAL HISTORY     Past Medical History:   Diagnosis Date    Arthritis     Encounter for follow-up examination after completed treatment for conditions other than malignant neoplasm 09/07/2022    Hospital discharge follow-up    Hyperlipidemia     Hypertension     Morbid (severe) obesity due to excess calories (CMS/[facility]) 08/30/2022    Morbid obesity with BMI of 40.0-44.9, adult    Other conditions influencing health status 08/30/2022    History of cough    Pacemaker     Personal history of other specified conditions 08/30/2022    History of paroxysmal nocturnal dyspnea    Unspecified open wound, unspecified ankle, initial encounter 08/30/2022    Wound of ankle       SURGICAL HISTORY       Past Surgical History:   Procedure Laterality Date    CARDIAC CATHETERIZATION      CATARACT EXTRACTION W/  INTRAOCULAR LENS IMPLANT Bilateral 10/11/2023    Dr Amaro    INSERT / REPLACE / REMOVE PACEMAKER      OTHER  SURGICAL HISTORY  09/07/2022    Pacemaker insertion       CURRENT MEDICATIONS       Discharge Medication List as of 4/10/2024  4:47 PM        CONTINUE these medications which have NOT CHANGED    Details   amLODIPine (Norvasc) 5 mg tablet Take 1 tablet (5 mg) by mouth once daily., Historical Med      carvedilol (Coreg) 25 mg tablet Take 2 tablets (50 mg) by mouth 2 times a day., Historical Med      furosemide (Lasix) 20 mg tablet Take 1 tablet (20 mg) by mouth once daily., Historical Med      lisinopril 20 mg tablet Take 0.5 tablets (10 mg) by mouth 2 times a day., Starting Wed 9/7/2022, Historical Med      multivitamin-min-FA-ginkgo (One Daily Women 50 Plus) 400-120 mcg-mg tablet Take 1 tablet by mouth once daily., Historical Med      simvastatin (Zocor) 20 mg tablet Take 1 tablet (20 mg) by mouth once daily., Starting Thu 10/6/2022, Historical Med      vit C/E/zinc/lutein/zeaxanthin (OCUVITE E[facility] HE[facility] ORAL) Take 1 tablet by mouth once daily., Historical Med             ALLERGIES     Aspirin    FAMILY HISTORY       Family History   Problem Relation Name Age of Onset    Cancer Mother      Macular degeneration Mother      Breast cancer Sister  36    Cancer Other Multiple Family Member         SOCIAL HISTORY       Social History     Socioeconomic History    Marital status:      Spouse name: None    Number of children: None    Years of education: None    Highest education level: None   Occupational History    None   Tobacco Use    Smoking status: Never     Passive exposure: Never    Smokeless tobacco: Never   Vaping Use    Vaping status: Never Used   Substance and Sexual Activity    Alcohol use: Not Currently    Drug use: Never    Sexual activity: None   Other Topics Concern    None   Social History Narrative    None     Social Determinants of Health     Financial Resource Strain: Not on file   Food Insecurity: Not on file   Transportation Needs: Not on file   Physical Activity: Not on file   Stress: Not on file    Social Connections: Not on file   Intimate Partner Violence: Not on file   Housing Stability: Not on file       PHYSICAL EXAM       ED Triage Vitals [04/10/24 1408]   Temperature Heart Rate Respirations BP   36.1 °C (97 °F) 82 18 141/75      Pulse Ox Temp Source Heart Rate Source Patient Position   96 % Temporal Monitor Sitting      BP Location FiO2 (%)     Right arm --       Physical Exam  Vitals and nursing note reviewed.   Constitutional:       General: She is not in acute distress.     Appearance: She is well-developed.   HENT:      Head: Normocephalic. Abrasion and contusion present.        Comments: Cephalhematoma  Abrasion to nasal bridge  Eyes:      Conjunctiva/sclera: Conjunctivae normal.   Cardiovascular:      Rate and Rhythm: Normal rate and regular rhythm.      Heart sounds: No murmur heard.  Pulmonary:      Effort: Pulmonary effort is normal. No respiratory distress.      Breath sounds: Normal breath sounds.   Abdominal:      Palpations: Abdomen is soft.      Tenderness: There is no abdominal tenderness.   Musculoskeletal:         General: No swelling.      Cervical back: Neck supple. No tenderness.      Comments: Abrasion to palms of bilateral hands   Skin:     General: Skin is warm and dry.      Capillary Refill: Capillary refill takes less than 2 seconds.   Neurological:      Mental Status: She is alert.   Psychiatric:         Mood and Affect: Mood normal.          DIAGNOSTIC RESULTS     LABS:  Labs Reviewed - No data to display    All other labs were within normal range or not returned as of this dictation.    Imaging  CT head wo IV contrast   Final Result   No evidence of acute cortical infarct or intracranial hemorrhage.        No evidence of intracranial hemorrhage or displaced skull fracture.        MACRO:   None        Signed by: Elvis Trevino 4/10/2024 3:16 PM   Dictation workstation:   [id number]      CT cervical spine wo IV contrast   Final Result   No evidence for an acute fracture or  subluxation of the cervical   spine. Discogenic degenerative changes.        MACRO:   None        Signed by: Elvis Bonillayovana 4/10/2024 3:22 PM   Dictation workstation:   [id number]      CT maxillofacial bones wo IV contrast   Final Result   No acute facial bone fracture visualized.Left frontal skull soft   tissue swelling/hematoma.        MACRO:   None        Signed by: Elvis Bonillayovana 4/10/2024 3:26 PM   Dictation workstation:   [id number]      CT 3D reconstruction   Final Result   No acute facial bone fracture visualized.Left frontal skull soft   tissue swelling/hematoma.        MACRO:   None        Signed by: Elvis Bonillayovana 4/10/2024 3:26 PM   Dictation workstation:   [id number]           Procedures  Procedures     EMERGENCY DEPARTMENT COURSE/MDM:   Medical Decision Making  Jacquelyn Buenrostro is an 68 y.o. female with history including CHF, HLD, HTN, pacemaker for sick sinus syndrome presenting to the emergency department for Mechanical fall. Based on the mechanism of injury and patient pain over 65 cannot use the Ivorian scoring to rule out any CT imaging at this time.  Patient does have a cephalhematoma to the left forehead.  CT head facial bones and C-spine ordered.  Patient given ice pack at bedside for the swelling and given Tylenol for the headache.    CT imaging negative.  Patient was discharged with outpatient follow-up.    =================Attending note===============    The patient was seen by the resident/fellow.  I have personally performed a substantive portion of the encounter.  I have seen and examined the patient; agree with the workup, evaluation, MDM,   management and diagnosis.  The care plan has been discussed with the resident; I have reviewed the resident's note and agree with the documented findings.      This is a 68 y.o. female who presents to ER after mechanical fall.  Patient was walking and there is uneven sidewalk and she tripped and she fell and she hit the left side of her face.  She  did have brief epistaxis.  No loss of conscious.  No nausea or vomiting.  No anticoagulants.  No hip pain.  No significant extremity pain.  There is some minor abrasions.  No neck pain.    PERRLA.  EOMI.  There is significant contusion above the left eye.  Visual fields intact.  No hemotympanums.  No CSF rhinorrhea.  No midline cervical spine tenderness.  Heart regular.  Lungs clear.  Abdomen soft and nontender.  No pain in the pelvis.  No pain in the extremities with movement.    No acute finding on CT imaging.    Patient is educated about ice.  Educated that the hematoma will spread.  She will follow-up with her doctor.  She will return to the nearest ER for any new or worsening symptoms.  She will use Tylenol for pain.  She is happy with this plan.          ==========================================          Diagnoses as of 04/12/24 0131   Head injury, initial encounter        External records reviewed: recent inpatient, clinic, and prior ED notes  Labs and Diagnostic imaging independently reviewed/interpreted by me.    Patient plan, care, lab results and imaging were all discussed with attending.    ED Medications administered this visit:    Medications   acetaminophen (Tylenol) tablet 650 mg (650 mg oral Given 4/10/24 1517)     New Prescriptions from this visit:    Discharge Medication List as of 4/10/2024  4:47 PM          (Please note that portions of this note were completed with a voice recognition program.  Efforts were made to edit the dictations but occasionally words are mis-transcribed.)     Leigh Robert DO  Resident  04/12/24 0131

## 2024-04-11 ENCOUNTER — OFFICE VISIT (OUTPATIENT)
Dept: PRIMARY CARE | Facility: CLINIC | Age: 69
End: 2024-04-11
Payer: COMMERCIAL

## 2024-04-11 DIAGNOSIS — I83.218 VARICOSE VEINS OF RIGHT LOWER EXTREMITY WITH BOTH ULCER OF OTHER PART OF LOWER EXTREMITY AND INFLAMMATION (CODE) (MULTI): ICD-10-CM

## 2024-04-11 DIAGNOSIS — I87.322 IDIOPATHIC CHRONIC VENOUS HYPERTENSION OF LEFT LEG WITH INFLAMMATION: ICD-10-CM

## 2024-04-11 DIAGNOSIS — E66.01 OBESITY, CLASS III, BMI 40-49.9 (MORBID OBESITY) (MULTI): ICD-10-CM

## 2024-04-11 DIAGNOSIS — Z01.818 PRE-OP TESTING: ICD-10-CM

## 2024-04-11 DIAGNOSIS — I83.12 VARICOSE VEINS OF BOTH LOWER EXTREMITIES WITH INFLAMMATION: ICD-10-CM

## 2024-04-11 DIAGNOSIS — J84.10 GRANULOMATOUS LUNG DISEASE (MULTI): Primary | ICD-10-CM

## 2024-04-11 DIAGNOSIS — I83.11 VARICOSE VEINS OF BOTH LOWER EXTREMITIES WITH INFLAMMATION: ICD-10-CM

## 2024-04-11 PROCEDURE — 93970 EXTREMITY STUDY: CPT | Performed by: INTERNAL MEDICINE

## 2024-04-11 PROCEDURE — 1160F RVW MEDS BY RX/DR IN RCRD: CPT | Performed by: INTERNAL MEDICINE

## 2024-04-11 PROCEDURE — 1159F MED LIST DOCD IN RCRD: CPT | Performed by: INTERNAL MEDICINE

## 2024-04-11 NOTE — PROGRESS NOTES
Venous Duplex      Indications:  Limb pain  Leg Edema    Sonographer: Olga Lidia Dumont RVT    TECHNIQUE:  Venous Duplex ultrasound spectral Doppler wave modality was performed with the patient in the supine and upright positions to determine the status of the deep and superficial systems of the right and left lower extremity. The common femoral, femoral and popliteal veins of the deep venous system were imaged. The superficial system was imaged entirely to include, but was not limited to, the saphenous-femoral junction (SFJ) down the greater saphenous vein from the groin to the ankle, and the saphenous-popliteal junction (SPJ), if present, at the popliteal fossa down the small saphenous vein (SSV) to the ankle. As indicated, the cranial extensions of the SSV (CESSV), the anterior accessory GSV (AAGSV), and the posterior accessory GSV (PAGSV) were also evaluated, if present. All areas meeting the criteria for venous reflux (500 milliseconds or greater in the saphenous veins and principle branches, 350 milliseconds in perforators and 1000 milliseconds in the deep system) were confirmed with spectral Doppler analysis and confirmatory images were documented:     FINDINGS ARE THE FOLLOWING:    RIGHT LEG:  There is no evidence of thrombus in the interrogated segments of the deep or superficial venous system. There is no evidence of deep venous reflux. There is incompetence of the saphenous-femoral junction and the saphenous-popliteal junction.    GSV dimensions: 8.5mm junction  4.8mm proximal  5.0mm mid  3.9mm distal  There is >0.5 seconds of reflux of the Greater Saphenous Vein  The reflux extends along the entire length of the GSV.    SSV dimensions: 3.6mm junction  3.8mm mid  There is >0.5 seconds of reflux in the Small Saphenous Vein    AAGSV dimensions: 5.4mm   There is >0.5 seconds of reflux in the Anterior Accessory Saphenous Vein    There are multiple incompetent tributaries along the thigh and lower leg.    Trib1  dimensions: 3.1mm  Trib2 dimensions: 3.7mm  There is >0.5 seconds of reflux in the tributaries       LEFT LEG:  There is no evidence of thrombus in the interrogated segments of the deep or superficial venous system. There is no evidence of deep venous reflux. There is incompetence of the saphenous-femoral junction.    GSV dimensions: 8.3mm junction  6.2mm proximal  5.5mm mid  3.5mm distal  There is >0.5 seconds of reflux of the Greater Saphenous Vein  The reflux extends along the entire length of the GSV.    SSV dimensions: 3.1mm junction  4.0mm mid  There is <0.5 seconds of reflux in the Small Saphenous Vein    AAGSV dimensions: 5.5mm   There is >0.5 seconds of reflux in the Anterior Accessory Saphenous Vein    There are multiple incompetent tributaries along the thigh and lower leg.    Trib1 dimensions: 3.8mm  Trib2 dimensions: 3.2mm  There is >0.5 seconds of reflux in the tributaries      CONCLUSIONS:  Dilation and incompetence of RT GSV, AAGSV and SSV and LT GSV and AAGSV, all refluxing from the deep junction.  There is no evidence of thrombus in the interrogated segments of the deep or superficial venous system in both legs.  No evidence of deep venous reflux.  Numerous incompetent tributaries are seen in both legs as noted in the MAP measured more than 3 mm in diameter.    Discussions  As per the detailed reflux study of the legs patient may benefit from TEVIN of  RT GSV, AAGSV, SSV and LT GSV, AAGSV followed by adjunctive Ultrasound guided noncompounded foam sclerotherapy of refluxing remnant of truncal vein X 1 each leg.  Patient also may need adjunctive image guided foam sclerotherapy X 1  in each leg.

## 2024-04-11 NOTE — PROGRESS NOTES
Bronchoscopy Scheduling Request    Pre-bronchoscopy visit: Follow-up visit with Bronchoscopy group provider  Please schedule procedure: Next available    Cytology on-site:  Yes  Location:  Either location  Performing physician:  Advanced diagnostic bronchoscopist  Referring physician:  MD Annabella Matt CNP, Kaye Grey MD  Indication:  Concern for Sarcoid (Cardiac sarcoid), Calcified LAD, Tree in bud opacities on imaging  Sedation / Anesthesia:  GA  Procedure:  BAL, EBBx, TBBx, Dx EBUS, Sarcoid protocol  Time:  Tier 2  Fluorscopy:   Yes  Imaging needed:  None  Labs:  CBC, BMP  Meds:  None  Special Considerations:  Cardiology clearance, PAT   Reviewed by:  Maggie Price MD

## 2024-04-12 ENCOUNTER — APPOINTMENT (OUTPATIENT)
Dept: PULMONOLOGY | Facility: CLINIC | Age: 69
End: 2024-04-12
Payer: COMMERCIAL

## 2024-04-12 ENCOUNTER — DOCUMENTATION (OUTPATIENT)
Dept: PULMONOLOGY | Facility: CLINIC | Age: 69
End: 2024-04-12

## 2024-04-12 NOTE — PROGRESS NOTES
I spoke with Mrs. Buenrostro to review results from labs, PFTs and CT Chest. I discussed her case with Dr. Aguila. Dr Aguila would like to get a EBUS with transbronchial biopsy before she follows up with her. Patient is aware and in agreement. Sent email to Interventional Pulmonary team for review.

## 2024-04-17 DIAGNOSIS — Z01.818 PRE-OP TESTING: ICD-10-CM

## 2024-04-17 DIAGNOSIS — D86.9 SARCOID: ICD-10-CM

## 2024-04-18 ENCOUNTER — OFFICE VISIT (OUTPATIENT)
Dept: PRIMARY CARE | Facility: CLINIC | Age: 69
End: 2024-04-18
Payer: COMMERCIAL

## 2024-04-18 VITALS
DIASTOLIC BLOOD PRESSURE: 65 MMHG | HEIGHT: 65 IN | BODY MASS INDEX: 40.65 KG/M2 | SYSTOLIC BLOOD PRESSURE: 111 MMHG | TEMPERATURE: 96.6 F | HEART RATE: 69 BPM | WEIGHT: 244 LBS

## 2024-04-18 VITALS
DIASTOLIC BLOOD PRESSURE: 65 MMHG | BODY MASS INDEX: 40.75 KG/M2 | HEART RATE: 69 BPM | HEIGHT: 65 IN | SYSTOLIC BLOOD PRESSURE: 111 MMHG | WEIGHT: 244.6 LBS

## 2024-04-18 DIAGNOSIS — I42.2 HYPERTROPHIC CARDIOMYOPATHY (MULTI): ICD-10-CM

## 2024-04-18 DIAGNOSIS — I83.12 VARICOSE VEINS OF BOTH LOWER EXTREMITIES WITH INFLAMMATION: Primary | ICD-10-CM

## 2024-04-18 DIAGNOSIS — I83.218 VARICOSE VEINS OF RIGHT LOWER EXTREMITY WITH BOTH ULCER OF OTHER PART OF LOWER EXTREMITY AND INFLAMMATION (CODE) (MULTI): ICD-10-CM

## 2024-04-18 DIAGNOSIS — J84.10 GRANULOMATOUS LUNG DISEASE (MULTI): ICD-10-CM

## 2024-04-18 DIAGNOSIS — I10 HTN (HYPERTENSION), BENIGN: ICD-10-CM

## 2024-04-18 DIAGNOSIS — E78.5 HYPERLIPIDEMIA, UNSPECIFIED HYPERLIPIDEMIA TYPE: ICD-10-CM

## 2024-04-18 DIAGNOSIS — Z91.81 HISTORY OF FALL: Primary | ICD-10-CM

## 2024-04-18 DIAGNOSIS — E66.01 OBESITY, CLASS III, BMI 40-49.9 (MORBID OBESITY) (MULTI): ICD-10-CM

## 2024-04-18 DIAGNOSIS — I83.11 VARICOSE VEINS OF BOTH LOWER EXTREMITIES WITH INFLAMMATION: Primary | ICD-10-CM

## 2024-04-18 DIAGNOSIS — R73.03 PRE-DIABETES: ICD-10-CM

## 2024-04-18 DIAGNOSIS — E66.01 OBESITY, MORBID, BMI 40.0-49.9 (MULTI): ICD-10-CM

## 2024-04-18 DIAGNOSIS — S00.83XS CONTUSION OF FACE, SEQUELA: ICD-10-CM

## 2024-04-18 DIAGNOSIS — I89.0 LYMPHEDEMA: ICD-10-CM

## 2024-04-18 DIAGNOSIS — D71: ICD-10-CM

## 2024-04-18 PROBLEM — S00.83XA CONTUSION OF FACE: Status: ACTIVE | Noted: 2024-04-18

## 2024-04-18 PROCEDURE — 3078F DIAST BP <80 MM HG: CPT | Performed by: INTERNAL MEDICINE

## 2024-04-18 PROCEDURE — 1036F TOBACCO NON-USER: CPT | Performed by: INTERNAL MEDICINE

## 2024-04-18 PROCEDURE — 1160F RVW MEDS BY RX/DR IN RCRD: CPT | Performed by: INTERNAL MEDICINE

## 2024-04-18 PROCEDURE — 99214 OFFICE O/P EST MOD 30 MIN: CPT | Performed by: INTERNAL MEDICINE

## 2024-04-18 PROCEDURE — 1159F MED LIST DOCD IN RCRD: CPT | Performed by: INTERNAL MEDICINE

## 2024-04-18 PROCEDURE — 99215 OFFICE O/P EST HI 40 MIN: CPT | Performed by: INTERNAL MEDICINE

## 2024-04-18 PROCEDURE — 3074F SYST BP LT 130 MM HG: CPT | Performed by: INTERNAL MEDICINE

## 2024-04-18 RX ORDER — LISINOPRIL 10 MG/1
1 TABLET ORAL DAILY
COMMUNITY
Start: 2024-03-12

## 2024-04-18 RX ORDER — ACETAMINOPHEN, DIPHENHYDRAMINE HCL, PHENYLEPHRINE HCL 325; 25; 5 MG/1; MG/1; MG/1
1 TABLET ORAL DAILY
COMMUNITY

## 2024-04-18 ASSESSMENT — PATIENT HEALTH QUESTIONNAIRE - PHQ9
2. FEELING DOWN, DEPRESSED OR HOPELESS: NOT AT ALL
SUM OF ALL RESPONSES TO PHQ9 QUESTIONS 1 AND 2: 0
1. LITTLE INTEREST OR PLEASURE IN DOING THINGS: NOT AT ALL
2. FEELING DOWN, DEPRESSED OR HOPELESS: NOT AT ALL
1. LITTLE INTEREST OR PLEASURE IN DOING THINGS: NOT AT ALL
SUM OF ALL RESPONSES TO PHQ9 QUESTIONS 1 AND 2: 0

## 2024-04-18 ASSESSMENT — LIFESTYLE VARIABLES
AUDIT TOTAL SCORE: 0
AUDIT-C TOTAL SCORE: 0
HOW OFTEN DO YOU HAVE A DRINK CONTAINING ALCOHOL: NEVER
HOW OFTEN DO YOU HAVE SIX OR MORE DRINKS ON ONE OCCASION: NEVER
HAVE YOU OR SOMEONE ELSE BEEN INJURED AS A RESULT OF YOUR DRINKING: NO
HOW MANY STANDARD DRINKS CONTAINING ALCOHOL DO YOU HAVE ON A TYPICAL DAY: PATIENT DOES NOT DRINK
HAS A RELATIVE, FRIEND, DOCTOR, OR ANOTHER HEALTH PROFESSIONAL EXPRESSED CONCERN ABOUT YOUR DRINKING OR SUGGESTED YOU CUT DOWN: NO
SKIP TO QUESTIONS 9-10: 1

## 2024-04-18 NOTE — PROGRESS NOTES
"Subjective   Patient ID: Jacquelyn Buenrostro is a 68 y.o. female who presents for Follow-up (4/10 [facility]- fall).    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 Pt states that she recovered well after the fall and has no headaches. Pt also was seen by the nephrology and pulmonology team, I also offered the GI team again evaluation because of the abnormal PET scan findings, but pt declined it. During the clinical encounter pt denies fever, chills, no SOB, no chest pain/tightness, no abdominal pain, no N/V/D reported, no change of urination reported. Pt denies any other health concerns during this visit.  Sohx: reviewed  PMHx and Fhx: reviewed    Review of Systems  The systems have been reviewed as follows:   Constitutional: no fever, no chills  Eyes: no eyesight problems, no eye redness, no eye pain, no blurred vision  ENT: no ear pain or sore throat, no nasal discharge or congestion, no sinus pressure, no hearing loss  Cardiovascular: no chest pain or tightness, no SOB, the heart rate was not fast or slow  Respiratory: no cough, no dyspnea with exertion or with rest, no wheezing  Gastrointestinal: no abdominal pain, no constipation or diarrhea, no heartburn, no nausea or vomiting  Genitourinary: no urine frequency, no dysuria, no urinary urgency, no urinary incontinence or retention  Musculoskeletal: no back pain, no arthralgia, no joint swelling or stiffness, no muscle weakness  Integumentary: no skin lesions or rash  Neurological: no headaches, no dizziness or fainting, no limb weakness  Psychiatric: no mood changes, no anxiety or depression, no SI/HI  Endocrine: no weight change, no temperature intolerance, no change of appetite  Hematologic/Lymphatic: no easy bruising or bleeding  Objective   /65   Pulse 69   Temp 35.9 °C (96.6 °F)   Ht 1.651 m (5' 5\")   Wt 111 kg (244 lb)   BMI 40.60 kg/m²     Physical " Exam  Constitutional: well hydrated, well nourished, no acute distress. Vital signs reviewed  Head and face: normocephalic, atraumatic  Eyes: no erythema, edema or visible abnormalities of conjunctiva or lids. Pupils are equal, round and reactive to light. Extraocular movement normal  ENT: external ears without deformities  Neck: full ROM, no adenopathy, neck was supple, thyroid gland not enlarged, no palpable nodules  Pulmonary: normal respiratory rate and effort. Lungs are clear to auscultation, no rales,no rhonchi or wheezing  Cardiovascular: RRR, normal S1 and S2, no murmur, no gallop, posterior tib. pulse normal 2+ bilaterally, no lower extremity edema  Abdomen: soft, non tender on palpation, not distended, normal BS in all 4 quadrants  Musculoskeletal: no joint swelling, normal movements of all 4 extremities. Gait and station: normal, Digits and nails: normal without clubbing  Skin: normal color, no rash  Neurologic: Cranial nerves: CN II: visual acuity intact. Source: acuity reported by patient. Pupils reactive to light with normal accommodation. Right and left pupil normal CN III, IV and VI: the EO movements were intact. CN VIII: no hearing loss. Deep tendon reflexes: were 2+ and symmetric:R and L patella.  Sensory exam: normal light to touch, pain and temperature  Psychiatric: Mood and affect: normal, Alert and Oriented x 3  Lymphatic: no cervical lymphadenopathy  Assessment/Plan   Hx of fall:   The recent ED report was reviewed  Pt recovered well, declined any PT sessions    HTN: well controlled on amlodipine 5 mg PO daily, lisinopril 10 mg PO daily, The BW orders for CBC, CMP and RSH in the EMR  Hypertrophic cardiomyopathy: Pt FU with the cardiology team  Pre diabetes: HGBa1C was ordered  HLD: on simvastatin 20 mg PO daily. Lipid panel to be checked  Obesity Who class II: continue with the lifestyle modifications  I discussed every sxs with the pt in detail. Pt verbalized understanding of the  health  condition, possible health risks, adverse events or reactions and even death. Pt agreed with the treatment plan.   Please FU with PCP as planned or sooner if needed.

## 2024-04-18 NOTE — PROGRESS NOTES
Chief Complaints:  Seen for follow-up after detailed ultrasound evaluation.    HPI:  68 years old female who has bilateral leg swellings with multiple symptoms came for a follow-up visit after she has had a detailed ultrasound evaluation.    Patient is known to have varicose veins for several years which has been symptomatic recently with leg swellings.  Patient's daughter is also undergoing varicose vein treatment.  But recently patient is having more short of breath and she is being investigated for possible amyloidosis.  Patient is waiting to have a bronchoscopy done within next few weeks to have the lung biopsy.    Patient has had an accidental fall about a week ago and she was taken to the ER and there was no head injury.  Patient will be following up with the PCP today after this visit.    Patient also following up with multiple other consults including cardiology, nephrology and pulmonologist.    She has been wearing the graduated compression stockings regularly and mostly she is wearing the knee-high ones but she has the thigh-high ones which she says has difficulty in keeping up with.  But she has some relief of symptoms with the thigh-high graduated compression stockings.      ROS:  Respiratory:  Has shortness of breath.  Denies cough, sinus congestion     Cardiovascular:     Denies chest pain.  Denies claudication but activities are limited.  No cyanosis.  At this time palpitations, denies.     Neurologic:     Occasional tingling/Numbness.        Social History:  No tobacco Use:    Current Outpatient Medications on File Prior to Visit   Medication Sig Dispense Refill    amLODIPine (Norvasc) 5 mg tablet Take 1 tablet (5 mg) by mouth once daily.      carvedilol (Coreg) 25 mg tablet Take 2 tablets (50 mg) by mouth 2 times a day.      furosemide (Lasix) 20 mg tablet Take 1 tablet (20 mg) by mouth once daily.      lisinopril 10 mg tablet Take 1 tablet (10 mg) by mouth once daily.      melatonin 10 mg tablet  "Take 1 tablet (10 mg) by mouth once daily.      multivitamin-min-FA-ginkgo (One Daily Women 50 Plus) 400-120 mcg-mg tablet Take 1 tablet by mouth once daily.      simvastatin (Zocor) 20 mg tablet Take 1 tablet (20 mg) by mouth once daily.      vit C/E/zinc/lutein/zeaxanthin (OCUVITE EYE HEALTH ORAL) Take 1 tablet by mouth once daily.      [DISCONTINUED] lisinopril 20 mg tablet Take 0.5 tablets (10 mg) by mouth 2 times a day.       No current facility-administered medications on file prior to visit.        Allergies   Allergen Reactions    Aspirin Unknown        Examination:    Visit Vitals  /65   Pulse 69   Ht 1.651 m (5' 5\")   Wt 111 kg (244 lb 9.6 oz)   BMI 40.70 kg/m²   OB Status Postmenopausal   Smoking Status Never   BSA 2.26 m²        Morbid obese, with no apparent distress. Alert oriented  Skin:  Normal turgor.  No rash.  Has moon face.  Facial contusion with left eduardo orbital hematoma/ contusion noted.  Head:  Normocephalic, atraumatic.  Eyes:  Pupils are equal, round,.  No pallor of conjunctivae.  Mucous membranes are moist  Neck:  Supple.  No JVD.  No carotid bruit.  No thyromegaly. No cervical lymphadenopathy.  No clubbing  Chest:  Vesicular breathing. Bilaterally moderate air entry.  No wheezing.  No crackles.  Heart:  Regular rate and rhythm.  S1, S2 positive.  No murmur.  Abdomen:  Soft and nontender.  Obese, has umbilical hernia noted bowel sounds are positive.  No organomegaly.  Extremities: Chronic lymphedema noted, detailed venous leg exam below.  Bilaterally 2+ dorsalis pedis pulses.  No calf tenderness. Homans sign is negative.  Neuro Exam: No focal signs. Gait is normal.     Venous Exam:  Varicose veins in left calf absent  Varicose veins in left thigh absent  Varicose veins in right calf present  Varicose veins in right thigh absent  Reticular veins in left calf present  Reticular veins in left thigh present  Reticular veins in right calf present  Reticular veins in right thigh " present  Telangiectasias in left calf present  Telangiectasias in left thigh present  Telangiectasias in right calf present  Telangiectasias in right thigh present  Right leg 2+ edema present  Left leg 2+ edema present  Hyperpigmentation in left leg absent  Hyperpigmentation in right leg absent  Lipodermatosclerosis in right leg absent  Lipodermatosclerosis in left leg absent  Dermatitis in left leg absent  Dermatitis in right leg absent  Corona phlebectatica in left leg present).  Corona phlebectatica in right leg present  Atrophe iván in left leg absent  Atrophe iván in right leg absent  Ulcer(s) in left leg absent  Ulcer(s) in right leg absent     CEAP Classification  Right leg CEAP C 4cS Ep  Left leg CEAP C 4cS Ep      Diagnosis/ Problems    Problem List Items Addressed This Visit       HTN (hypertension), benign    Varicose vein of leg - Primary    Hypertrophic cardiomyopathy (Multi)    Obesity, morbid, BMI 40.0-49.9 (Multi)    Varicose veins of right lower extremity with both ulcer of other part of lower extremity and inflammation (CODE) (Multi)    Granulomatous disease, chronic (Multi)    Lymphedema    Granulomatous lung disease (Multi)    Contusion of face          Plan     Chronic venous insufficiency of bilateral lower extremities with other complications    patient may benefit from TEVIN of  RT GSV, AAGSV, SSV and LT GSV, AAGSV followed by adjunctive Ultrasound guided noncompounded foam sclerotherapy of refluxing remnant of truncal vein X 1 each leg.  Patient also may need adjunctive image guided foam sclerotherapy X 1  in each leg.  But at this time because of patient's other comorbid conditions specially possible active symptoms for which she is undergoing investigations to rule out sarcoidosis it was decided that she will continue the bilateral thigh-high graduated compression stocking and she will follow-up with me in 3 months time to have a reevaluation to decide about any treatment  options.    Discussions   Patient's detail ultrasound evaluation including the illustration of the refluxing superficial venous system of both legs were reviewed in detail with the patient.  Patient has both truncal vein and also tributaries reflux identified giving rise to the symptoms.    Closure of the truncal veins by catheter procedures as venaseal / EVLA and ultrasound-guided foam sclerotherapy of large refluxing veins was discussed. Risks, benefits, goals and alternatives and reasonable expectations were discussed for at least 20 min. All risks were discussed, including, but not limited to hyperpigmentation, skin necrosis, recurrence/progression of the disease/symptoms, infection, DVT, SVT and all other possible complications.    We discussed the recent documentation that the if the BMI is more than 40 the success of venous procedures are limited. I encouraged the patient that he should the try to improve his health by loosing weight if possible prior to the venous procedures. Given the patient's significant obesity with a BMI of 40.60 I advised the patient that she should work with the primary care physician and other appropriate consults to lose weight.    We discussed in detail that she would be a high risk candidate for any intervention for her bilateral chronic venous insufficiency in the superficial venous system given that she has some underlying chronic problems which could be a inflammatory disease or a chronic disease as amyloidosis.  Once we have the investigations completed and treatment started we will further review and discuss the management options depending on her symptoms in about 3 months time.      Counseling.  The patient was counseled regarding instructions for management, risk factor reductions, prognosis, patient and family education, impressions, risks and benefits of treatment options and importance of compliance with treatment. Total time of encounter was 36 minutes and 23 minutes  was spent counseling.

## 2024-04-24 ENCOUNTER — OFFICE VISIT (OUTPATIENT)
Dept: OPHTHALMOLOGY | Facility: CLINIC | Age: 69
End: 2024-04-24
Payer: COMMERCIAL

## 2024-04-24 DIAGNOSIS — H35.61 RETINAL HEMORRHAGE NOTED ON EXAMINATION OF RIGHT EYE: Primary | ICD-10-CM

## 2024-04-24 DIAGNOSIS — Z13.1 DIABETES MELLITUS SCREENING: ICD-10-CM

## 2024-04-24 DIAGNOSIS — Q14.1 CONGENITAL HYPERTROPHY OF RETINAL PIGMENT EPITHELIUM: ICD-10-CM

## 2024-04-24 DIAGNOSIS — H35.342 LAMELLAR MACULAR HOLE OF LEFT EYE: ICD-10-CM

## 2024-04-24 DIAGNOSIS — R73.03 PRE-DIABETES: ICD-10-CM

## 2024-04-24 PROCEDURE — 1036F TOBACCO NON-USER: CPT | Performed by: OPHTHALMOLOGY

## 2024-04-24 PROCEDURE — 1159F MED LIST DOCD IN RCRD: CPT | Performed by: OPHTHALMOLOGY

## 2024-04-24 PROCEDURE — 1160F RVW MEDS BY RX/DR IN RCRD: CPT | Performed by: OPHTHALMOLOGY

## 2024-04-24 PROCEDURE — 99213 OFFICE O/P EST LOW 20 MIN: CPT | Performed by: OPHTHALMOLOGY

## 2024-04-24 PROCEDURE — 92134 CPTRZ OPH DX IMG PST SGM RTA: CPT | Mod: BILATERAL PROCEDURE | Performed by: OPHTHALMOLOGY

## 2024-04-24 ASSESSMENT — ENCOUNTER SYMPTOMS
RESPIRATORY NEGATIVE: 0
CARDIOVASCULAR NEGATIVE: 0
ALLERGIC/IMMUNOLOGIC NEGATIVE: 0
PSYCHIATRIC NEGATIVE: 0
MUSCULOSKELETAL NEGATIVE: 0
ENDOCRINE NEGATIVE: 0
EYES NEGATIVE: 0
NEUROLOGICAL NEGATIVE: 0
CONSTITUTIONAL NEGATIVE: 0
HEMATOLOGIC/LYMPHATIC NEGATIVE: 0
GASTROINTESTINAL NEGATIVE: 0

## 2024-04-24 ASSESSMENT — VISUAL ACUITY
OS_SC: 20/20
METHOD: SNELLEN - LINEAR
OD_SC: 20/20

## 2024-04-24 ASSESSMENT — CONF VISUAL FIELD
OS_NORMAL: 1
OS_INFERIOR_TEMPORAL_RESTRICTION: 0
OD_SUPERIOR_TEMPORAL_RESTRICTION: 0
OS_INFERIOR_NASAL_RESTRICTION: 0
OS_SUPERIOR_NASAL_RESTRICTION: 0
OD_NORMAL: 1
OD_SUPERIOR_NASAL_RESTRICTION: 0
OS_SUPERIOR_TEMPORAL_RESTRICTION: 0
OD_INFERIOR_TEMPORAL_RESTRICTION: 0
OD_INFERIOR_NASAL_RESTRICTION: 0

## 2024-04-24 ASSESSMENT — EXTERNAL EXAM - RIGHT EYE: OD_EXAM: NORMAL

## 2024-04-24 ASSESSMENT — CUP TO DISC RATIO
OD_RATIO: .3
OS_RATIO: .3

## 2024-04-24 ASSESSMENT — EXTERNAL EXAM - LEFT EYE: OS_EXAM: NORMAL

## 2024-04-24 ASSESSMENT — SLIT LAMP EXAM - LIDS
COMMENTS: GOOD POSITION
COMMENTS: GOOD POSITION

## 2024-04-24 ASSESSMENT — TONOMETRY
OS_IOP_MMHG: 15
IOP_METHOD: GOLDMANN APPLANATION
OD_IOP_MMHG: 15

## 2024-05-02 ENCOUNTER — HOSPITAL ENCOUNTER (OUTPATIENT)
Dept: CARDIOLOGY | Facility: HOSPITAL | Age: 69
Discharge: HOME | End: 2024-05-02
Payer: COMMERCIAL

## 2024-05-02 ENCOUNTER — PRE-ADMISSION TESTING (OUTPATIENT)
Dept: PREADMISSION TESTING | Facility: HOSPITAL | Age: 69
End: 2024-05-02
Payer: COMMERCIAL

## 2024-05-02 VITALS
TEMPERATURE: 98.1 F | BODY MASS INDEX: 41.38 KG/M2 | SYSTOLIC BLOOD PRESSURE: 119 MMHG | DIASTOLIC BLOOD PRESSURE: 82 MMHG | WEIGHT: 248.4 LBS | HEIGHT: 65 IN | HEART RATE: 71 BPM | OXYGEN SATURATION: 95 %

## 2024-05-02 DIAGNOSIS — Z01.818 PREOP EXAMINATION: Primary | ICD-10-CM

## 2024-05-02 DIAGNOSIS — D86.9 SARCOID: ICD-10-CM

## 2024-05-02 DIAGNOSIS — Z01.818 PRE-OP TESTING: ICD-10-CM

## 2024-05-02 DIAGNOSIS — Z01.818 PREOP EXAMINATION: ICD-10-CM

## 2024-05-02 LAB
ANION GAP SERPL CALC-SCNC: 16 MMOL/L (ref 10–20)
BUN SERPL-MCNC: 30 MG/DL (ref 6–23)
CALCIUM SERPL-MCNC: 9.2 MG/DL (ref 8.6–10.6)
CHLORIDE SERPL-SCNC: 101 MMOL/L (ref 98–107)
CO2 SERPL-SCNC: 26 MMOL/L (ref 21–32)
CREAT SERPL-MCNC: 1.55 MG/DL (ref 0.5–1.05)
EGFRCR SERPLBLD CKD-EPI 2021: 37 ML/MIN/1.73M*2
ERYTHROCYTE [DISTWIDTH] IN BLOOD BY AUTOMATED COUNT: 14.3 % (ref 11.5–14.5)
GLUCOSE SERPL-MCNC: 72 MG/DL (ref 74–99)
HCT VFR BLD AUTO: 38.3 % (ref 36–46)
HGB BLD-MCNC: 11.4 G/DL (ref 12–16)
MCH RBC QN AUTO: 28.6 PG (ref 26–34)
MCHC RBC AUTO-ENTMCNC: 29.8 G/DL (ref 32–36)
MCV RBC AUTO: 96 FL (ref 80–100)
NRBC BLD-RTO: 0 /100 WBCS (ref 0–0)
PLATELET # BLD AUTO: 222 X10*3/UL (ref 150–450)
POTASSIUM SERPL-SCNC: 4.8 MMOL/L (ref 3.5–5.3)
RBC # BLD AUTO: 3.99 X10*6/UL (ref 4–5.2)
SODIUM SERPL-SCNC: 138 MMOL/L (ref 136–145)
WBC # BLD AUTO: 9.5 X10*3/UL (ref 4.4–11.3)

## 2024-05-02 PROCEDURE — 36415 COLL VENOUS BLD VENIPUNCTURE: CPT

## 2024-05-02 PROCEDURE — 85027 COMPLETE CBC AUTOMATED: CPT

## 2024-05-02 PROCEDURE — 93005 ELECTROCARDIOGRAM TRACING: CPT

## 2024-05-02 PROCEDURE — 82374 ASSAY BLOOD CARBON DIOXIDE: CPT

## 2024-05-02 PROCEDURE — 99205 OFFICE O/P NEW HI 60 MIN: CPT | Performed by: ANESTHESIOLOGY

## 2024-05-02 PROCEDURE — 93010 ELECTROCARDIOGRAM REPORT: CPT | Performed by: INTERNAL MEDICINE

## 2024-05-02 ASSESSMENT — DUKE ACTIVITY SCORE INDEX (DASI)
CAN YOU HAVE SEXUAL RELATIONS: YES
CAN YOU WALK INDOORS, SUCH AS AROUND YOUR HOUSE: YES
CAN YOU PARTICIPATE IN MODERATE RECREATIONAL ACTIVITIES LIKE GOLF, BOWLING, DANCING, DOUBLES TENNIS OR THROWING A BASEBALL OR FOOTBALL: YES
TOTAL_SCORE: 30.2
CAN YOU CLIMB A FLIGHT OF STAIRS OR WALK UP A HILL: YES
CAN YOU DO HEAVY WORK AROUND THE HOUSE LIKE SCRUBBING FLOORS OR LIFTING AND MOVING HEAVY FURNITURE: NO
CAN YOU DO LIGHT WORK AROUND THE HOUSE LIKE DUSTING OR WASHING DISHES: YES
CAN YOU DO MODERATE WORK AROUND THE HOUSE LIKE VACUUMING, SWEEPING FLOORS OR CARRYING GROCERIES: YES
CAN YOU WALK A BLOCK OR TWO ON LEVEL GROUND: YES
DASI METS SCORE: 6.5
CAN YOU DO YARD WORK LIKE RAKING LEAVES, WEEDING OR PUSHING A MOWER: NO
CAN YOU TAKE CARE OF YOURSELF (EAT, DRESS, BATHE, OR USE TOILET): YES
CAN YOU RUN A SHORT DISTANCE: NO
CAN YOU PARTICIPATE IN STRENOUS SPORTS LIKE SWIMMING, SINGLES TENNIS, FOOTBALL, BASKETBALL, OR SKIING: NO

## 2024-05-02 ASSESSMENT — ENCOUNTER SYMPTOMS
SHORTNESS OF BREATH: 1
NECK NEGATIVE: 1
JOINT SWELLING: 1
ARTHRALGIAS: 1
NEUROLOGICAL NEGATIVE: 1
GASTROINTESTINAL NEGATIVE: 1
CONSTITUTIONAL NEGATIVE: 1
EYES NEGATIVE: 1
ENDOCRINE NEGATIVE: 1

## 2024-05-02 ASSESSMENT — ACTIVITIES OF DAILY LIVING (ADL): ADL_SCORE: 4

## 2024-05-02 ASSESSMENT — LIFESTYLE VARIABLES: SMOKING_STATUS: NONSMOKER

## 2024-05-02 NOTE — CPM/PAT H&P
CPM/PAT Evaluation       Name: Jacquelyn Buenrostro (Jacquelyn Buenrostro)  /Age: 1955/68 y.o.     In-Person       Chief Complaint: Evaluation prior to surgery     HPI  68 y.o.  female  scheduled for Bronchoscopy/EBUS  on  with Dr. Dr Kumar for evaluation of granulomatous lung disease.  PMHX includes HTN, chronic diastolic heart failure (HFpEF), CAD, Sick sinus syndrome with complete heart block sp pacemaker, HLD, varicose veins, morbid obesity, arthritis.  Presents to CPM today for perioperative risk stratification and optimization.    Past Medical History:   Diagnosis Date    Anxiety     Arrhythmia     AV BLOCK PMR    Arthritis     BMI 40.0-44.9, adult (Multi)     Cataract     CHF (congestive heart failure) (Multi)     HFpEF    CKD (chronic kidney disease)     CKD stage G3b/A2, GFR 30-44 and albumin creatinine ratio  mg/g (Multi)     Congenital hypertrophy of retinal pigment epithelium     Diastolic heart failure, stage C (Multi)     Falls     Heart disease     Hiatal hernia     History of paroxysmal nocturnal dyspnea     Hyperlipidemia     Hypertension     Morbid (severe) obesity due to excess calories (Multi) 2022    Morbid obesity with BMI of 40.0-44.9, adult    Obesity     Pacemaker     [facility]    Retinal hemorrhage, right eye     Sarcoidosis     Suspected sleep apnea     Swelling of both lower extremities     Type 2 diabetes mellitus (Multi)     PRE DM    Varicose veins of right lower extremity        Past Surgical History:   Procedure Laterality Date    CARDIAC CATHETERIZATION      CATARACT EXTRACTION W/  INTRAOCULAR LENS IMPLANT Bilateral 10/11/2023    Dr Amaro    INSERT / REPLACE / REMOVE PACEMAKER  2022       Patient  reports that she is not currently sexually active.    Family History   Problem Relation Name Age of Onset    Heart disease Mother      Breast cancer Mother      Cancer Mother      Macular degeneration Mother      Cancer Father      Breast cancer Sister  36    Cancer  Brother      Cancer Other Multiple Family Member        Allergies   Allergen Reactions    Aspirin Unknown     Nose bleed          Prior to Admission medications    Medication Sig Start Date End Date Taking? Authorizing Provider   amLODIPine (Norvasc) 5 mg tablet Take 1 tablet (5 mg) by mouth once daily.   Yes Historical Provider, MD   carvedilol (Coreg) 25 mg tablet Take 2 tablets (50 mg) by mouth 2 times a day.   Yes Historical Provider, MD   furosemide (Lasix) 20 mg tablet Take 1 tablet (20 mg) by mouth once daily.   Yes Historical Provider, MD   lisinopril 10 mg tablet Take 1 tablet (10 mg) by mouth once daily. 3/12/24  Yes Historical Provider, MD   melatonin 10 mg tablet Take 1 tablet (10 mg) by mouth once daily.   Yes Historical Provider, MD   multivitamin-min-FA-ginkgo (One Daily Women 50 Plus) 400-120 mcg-mg tablet Take 1 tablet by mouth once daily.   Yes Historical Provider, MD   simvastatin (Zocor) 20 mg tablet Take 1 tablet (20 mg) by mouth once daily. 10/6/22  Yes Historical Provider, MD   vit C/E/zinc/lutein/zeaxanthin (OCUVITE EYE HEALTH ORAL) Take 1 tablet by mouth once daily.   Yes Historical Provider, MD FUNK ROS:   Constitutional:   neg    Neuro/Psych:   neg    Eyes:   neg    Ears:   neg    Nose:   Mouth:   Throat:   Neck:   neg    Cardio:    Mild dyspnea on exertion    peripheral edema  Respiratory:    Mild SOB on exertion    shortness of breath  Endocrine:   neg    GI:   neg    :   neg    Musculoskeletal:    arthralgias   swelling  Hematologic:   neg    Skin:      Physical Exam  Vitals reviewed.   Constitutional:       Appearance: Normal appearance. She is obese.   HENT:      Head: Normocephalic and atraumatic.   Eyes:      Extraocular Movements: Extraocular movements intact.      Conjunctiva/sclera: Conjunctivae normal.      Pupils: Pupils are equal, round, and reactive to light.   Cardiovascular:      Rate and Rhythm: Normal rate and regular rhythm.      Pulses: Normal pulses.      Heart  sounds: Normal heart sounds.   Pulmonary:      Effort: Pulmonary effort is normal.      Breath sounds: Normal breath sounds.   Abdominal:      General: Abdomen is flat.      Palpations: Abdomen is soft.   Musculoskeletal:      Cervical back: Normal range of motion.      Right lower leg: Edema present.      Left lower leg: Edema present.      Comments: Bilateral lower extremity edema    Skin:     General: Skin is warm.   Neurological:      Mental Status: She is alert and oriented to person, place, and time.          PAT AIRWAY:   Airway:      Short neck    Mallampati::  IV    TM distance::  <3 FB    Neck ROM::  Full        Visit Vitals  /82   Pulse 71   Temp 36.7 °C (98.1 °F)       DASI Risk Score      Flowsheet Row Most Recent Value   DASI SCORE 30.2   METS Score (Will be calculated only when all the questions are answered) 6.5          Caprini DVT Assessment      Flowsheet Row Most Recent Value   DVT Score 8   Current Status Varicose veins, Swollen legs, Minor surgery planned   Age 60-75 years   BMI 41-50 (Morbid obesity)          Modified Frailty Index      Flowsheet Row Most Recent Value   Modified Frailty Index Calculator .1818          CHADS2 Stroke Risk  Current as of 56 minutes ago        N/A 3 to 100%: High Risk   2 to < 3%: Medium Risk   0 to < 2%: Low Risk     Last Change: N/A          This score determines the patient's risk of having a stroke if the patient has atrial fibrillation.        This score is not applicable to this patient. Components are not calculated.          Revised Cardiac Risk Index      Flowsheet Row Most Recent Value   Revised Cardiac Risk Calculator 1          Apfel Simplified Score      Flowsheet Row Most Recent Value   Apfel Simplified Score Calculator 2          Risk Analysis Index Results This Encounter         5/2/2024  1409             [facility] Cancer History: Patient does not indicate history of cancer    Total Risk Analysis Index Score Without Cancer: 37    Total Risk  Analysis Index Score: 37          Stop Bang Score      Flowsheet Row Most Recent Value   Do you snore loudly? 0   Do you often feel tired or fatigued after your sleep? 0   Has anyone ever observed you stop breathing in your sleep? 0   Do you have or are you being treated for high blood pressure? 1   Recent BMI (Calculated) 40.6   Is BMI greater than 35 kg/m2? 1=Yes   Age older than 50 years old? 1=Yes   Is your neck circumference greater than 17 inches (Male) or 16 inches (Female)? 1   Gender - Male 0=No   STOP-BANG Total Score 4            Assessment and Plan:     No results found for this or any previous visit (from the past 24 hour(s)).     Assessment & Plan:    68 y.o.  female  scheduled for Bronchoscopy/EBUS  on 5/17 with Dr. Dr Kumar for evaluation of granulomatous lung disease.  PMHX includes HTN, chronic diastolic heart failure (HFpEF), CAD, Sick sinus syndrome with complete heart block sp pacemaker, HLD, varicose veins, morbid obesity, arthritis.  Presents to CPM today for perioperative risk stratification and optimization.    Neuro:  No neurologic diagnosis, however, the patient is at increased risk for perioperative delirium secondary to age, decreased functional status, Patient instructed on and provided cognitive exercises  Patient is at increased risk for perioperative CVA secondary to CRF, cardiac disease, HTN, increased age    HEENT:  No HEENT diagnosis or significant findings on chart review or clinical presentation and evaluation. No further preoperative testing/intervention indicated at this time.    Cardiovascular:  History of HTN, chronic diastolic heart failure (HFpEF), Sick sinus syndrome with complete heart block sp pacemaker, HLD  HTN: controlled on amlodipine, carvedilol, lisinopril  Chronic diastolic heart failure (HFpEF):  ECHO 6/20/2023  1. Left ventricular systolic function is normal with a 65-70% estimated ejection fraction.  2. Spectral Doppler shows an impaired relaxation  pattern of left ventricular diastolic filling.  3. Mild mitral valve regurgitation.  4. RVSP within normal limits.  5. There is no evidence of a patent foramen ovale.  CAD:   Cardiac catheterization in January 2023, showing minimal coronary disease. Patient currently not taking aspirin due to fear of nose bleed. She was counseled about the importance of taking her aspirin.   Sick sinus syndrome with complete heart block sp pacemaker:  [employer] DDDR pacemaker placement on 9/1/2022.  HLD: On simvastatin  METS: 6.5  RCRI: 1 point, 6.0% risk for postoperative MACE     Pulmonary:  Granulomatous lung disease:   Diagnosed on imaging to be confirmed with EBUS on 5/17. She reports mild dyspnea on exertion.   Patient at increased risk of pulmonary complication due to age > 60, obesity, heart failure, lower cognition  Stop Bang score is 4 placing patient at moderate risk for HUGH  ARISCAT: <26 points, 1.6% risk of in-hospital postoperative pulmonary complication  PRODIGY: High risk for opioid induced respiratory depression    Renal:   CKD: Patient following with nephrology, creatine stable at 1.47, GFR 39. Patient on furosemide.  Age equal to or greater than 56, HTN, renal insufficiency , use of an ace, arb, or NSAID      Endocrine:  Prediabetes: HBA1C 5.9.     Hematologic:  No hematologic diagnosis, however patient is at an increased risk for DVT  Caprini Score 6, patient at High risk for perioperative DVT.  Patient provided with VTE education/handout.    Gastrointestinal:   No GI diagnosis or significant findings on chart review or clinical presentation and evaluation.   Apfel: 2    Vascular:   Varicose veins: History of chronic venous stasis, with chronic lower extremity edema. Venous insufficiency with chronic pitting and non-pitting edema, stable. Following with vascular resect lower extremity doppler negative for DVT.   Infectious disease:   No infectious diagnosis or significant findings on chart review or clinical  presentation and evaluation.     Musculoskeletal:   Arthritis: Limits mobility. Recent fall due unstable gait.     Anesthesia/Airway:  No prior general anesthesia. Possible difficult intubation, Mallampati 4, short neck, TM distance <3      Medication instructions and NPO guidelines reviewed with the patient.  All questions or concerns discussed and addressed.      Patient seen and staffed with Dr. Rizzo

## 2024-05-02 NOTE — PREPROCEDURE INSTRUCTIONS
Thank you for visiting [facility] ([facility]) today for your pre-procedure evaluation, you were seen by Dr Johnson ([phone])    This summary includes instructions and information to aid you during your perioperative period.  Please read carefully. If you have any questions about your visit today, please call the number listed above.  If you become ill or have any changes to your health before your surgery, please contact your primary care provider and alert your surgeon.    Preparing for your Surgery       Exercises  Preoperative Deep Breathing Exercises  Why it is important to do deep breathing exercises before my surgery?  Deep breathing exercises strengthen your breathing muscles.  This helps you to recover after your surgery and decreases the chance of breathing complications.  How are the deep breathing exercises done?  Sit straight with your back supported.  Breathe in deeply and slowly through your nose. Your lower rib cage should expand and your abdomen may move forward.  Hold that breath for 3 to 5 seconds.  Breathe out through pursed lips, slowly and completely.  Rest and repeat 10 times every hour while awake.  Rest longer if you become dizzy or lightheaded.       Incentive Spirometer   You were provided with an incentive spirometer in [facility], please follow the below instructions.  What is an incentive spirometer?  An incentive spirometer is a device used before and after surgery to “exercise” your lungs.  It helps you to take deeper breaths to expand your lungs.  Below is an example of a basic incentive spirometer.  The device you receive may differ slightly but they all function the same.    Why do I need to use an incentive spirometer?  Using your incentive spirometer prepares your lungs for surgery and helps prevent lung problems after surgery.  How do I use my incentive spirometer?  When you're using your incentive spirometer, make sure to breathe through your mouth. If  you breathe through your nose, the incentive spirometer won't work properly. You can hold your nose if you have trouble.  If you feel dizzy at any time, stop and rest. Try again at a later time.  Follow the steps below:  Set up your incentive spirometer, expand the flexible tubing and connect to the outlet.  Sit upright in a chair or bed. Hold the incentive spirometer at eye level.   Put the mouthpiece in your mouth and close your lips tightly around it. Slowly breathe out (exhale) completely.  Breathe in (inhale) slowly through your mouth as deeply as you can. As you take a breath, you will see the piston rise inside the large column. While the piston rises, the indicator should move upwards. It should stay in between the 2 arrows (see Figure).  Try to get the piston as high as you can, while keeping the indicator between the arrows.   If the indicator doesn't stay between the arrows, you're breathing either too fast or too slow.  When you get it as high as you can, hold your breath for 10 seconds, or as long as possible. While you're holding your breath, the piston will slowly fall to the base of the spirometer.  Once the piston reaches the bottom of the spirometer, breathe out slowly through your mouth. Rest for a few seconds.  Repeat 10 times. Try to get the piston to the same level with each breath.  Repeat every hour while awake  You can carefully clean the outside of the mouthpiece with an alcohol wipe or soap and water.      Preoperative Brain Exercises    What are brain exercises?  A brain exercise is any activity that engages your thinking (cognitive) skills.    What types of activities are considered brain exercises?  Jigsaw puzzles, crossword puzzles, word jumble, memory games, word search, and many more.  Many can be found free online or on your phone via a mobile apryl.    Why should I do brain exercises before my surgery?  More recent research has shown brain exercise before surgery can lower the risk  of postoperative delirium (confusion) which can be especially important for older adults.  Patients who did brain exercises for 5 to 10 hours the days before surgery, cut their risk of postoperative delirium in half up to 1 week after surgery.    Sit-to-Stand Exercise    What is the sit-to-stand exercise?  The sit-to-stand exercise strengthens the muscles of your lower body and muscles in the center of your body (core muscles for stability) helping to maintain and improve your strength and mobility.  How do I do the sit-to-stand exercise?  The goal is to do this exercise without using your arms or hands.  If this is too difficult, use your arms and hands or a chair with armrests to help slowly push yourself to the standing position and lower yourself back to the sitting position. As the movement becomes easier use your arms and hands less.    Steps to the sit-to-stand exercise  Sit up tall in a sturdy chair, knees bent, feet flat on the floor shoulder-width apart.  Shift your hips/pelvis forward in the chair to correctly position yourself for the next movement.  Lean forward at your hips.  Stand up straight putting equal weight on both feet.  Check to be sure you are properly aligned with the chair, in a slow controlled movement sit back down.  Repeat this exercise 10-15 times.  If needed you can do it fewer times until your strength improves.  Rest for 1 minute.  Do another 10-15 sit-to-stand exercises.  Try to do this in the morning and evening.        Instructions    Preoperative Fasting Guidelines    Why must I stop eating and drinking near surgery time?  With sedation, food or liquid in your stomach can enter your lungs causing serious complications  Food can increase nausea and vomiting  When do I need to stop eating and drinking before my surgery?      Do not eat any food or drink any liquids after midnight the night before your surgery/procedure.  You may have sips of water to take medications.             Simple things you can do to help prevent blood clots     Blood clots are blockages that can form in the body's veins. When a blood clot forms in your deep veins, it may be called a deep vein thrombosis, or DVT for short. Blood clots can happen in any part of the body where blood flows, but they are most common in the arms and legs. If a piece of a blood clot breaks free and travels to the lungs, it is called a pulmonary embolus (PE). A PE can be a very serious problem.         Being in the hospital or having surgery can raise your chances of getting a blood clot because you may not be well enough to move around as much as you normally do.         Ways you can help prevent blood clots in the hospital       Wearing SCDs  SCDs stands for Sequential Compression Devices.   SCDs are special sleeves that wrap around your legs. They attach to a pump that fills them with air to gently squeeze your legs every few minutes.  This helps return the blood in your legs to your heart.   SCDs should only be taken off when walking or bathing. SCDs may not be comfortable, but they can help save your life.              Pump SCD leg sleeves  Wearing compression stockings - if your doctor orders them. These special snug-fitting stockings gently squeeze your legs to help blood flow.       Walking. Walking helps move the blood in your legs.   If your doctor says it is ok, try walking the halls at least   5 times a day. Ask us to help you get up, so you don't fall.      Taking any blood-thinning medicines your doctor orders.              Ways you can help prevent blood clots at home         Wearing compression stockings - if your doctor orders them.   Walking - to help move the blood in your legs.    Taking any blood-thinning medicines your doctor orders.      Signs of a blood clot or PE    Tell your doctor or nurse right away if you have any of the problems listed below.         If you are at home, seek medical care right away. Call [phone]  for chest pain or problems breathing.            Signs of a blood clot (DVT) - such as pain, swelling, redness, or warmth in your arm or legs.  Signs of a pulmonary embolism (PE) - such as chest pain or feeling short of breath      Tobacco and Alcohol;  Do not drink alcohol or smoke within 24 hours of surgery.  It is best to quit smoking for as long as possible before any surgery or procedure.    The Week before Surgery        Seven days before Surgery  Check your [facility] medication instructions  Do the exercises provided to you by [facility]   Arrange for a responsible, adult licensed  to take you home after surgery and stay with you for 24 hours.  You will not be permitted to drive yourself home if you have received any anesthetic/sedation  Six days before surgery  Check your [facility] medication instructions  Do the exercises provided to you by [facility]   Start using Chlorhexidene (CHG) body wash if prescribed  Five days before surgery  Check your [facility] medication instructions  Do the exercises provided to you by [facility]   Continue to use CHG body wash if prescribed  Three days before surgery  Check your [facility] medication instructions  Do the exercises provided to you by [facility]   Continue to use CHG body wash if prescribed  Two days before surgery  Check your [facility] medication instructions  Do the exercises provided to you by [facility]   Continue to use CHG body wash if prescribed    The Day before Surgery       Check your [facility] medication and all other [facility] instructions including when to stop eating and drinking  You will be called with your arrival time for surgery in the late afternoon.  If you do not receive a call please reach out to your surgeon's office.  Do not smoke or drink 24 hours before surgery  Prepare items to bring with you to the hospital  Shower with your chlorhexidine wash if prescribed  Brush your teeth and use your chlorhexidine dental rinse if prescribed    The Day of Surgery       Check your [facility] medication instructions  Ensure  you follow the instructions for when to stop eating and drinking  Shower, if prescribed use CHG.  Do not apply any lotions, creams, moisturizers, perfume or deodorant  Brush your teeth and use your CHG dental rinse if prescribed  Wear loose comfortable clothing  Avoid make-up  Remove  jewelry and piercings, consider professional piercing removal with a plastic spacer if needed  Bring photo ID and Insurance card  Bring an accurate medication list that includes medication dose, frequency and allergies  Bring a copy of your advanced directives (will, health care power of )  Bring any devices and controllers as well as medical devices you have been provided with for surgery (CPAP, slings, braces, etc.)  Dentures, eyeglasses, and contacts will be removed before surgery, please bring cases for contacts or glasses

## 2024-05-13 PROCEDURE — 93005 ELECTROCARDIOGRAM TRACING: CPT

## 2024-05-13 NOTE — PROGRESS NOTES
Patient: Jacquelyn Buenrostro    [id number]  : 1955 -- AGE 68 y.o.    Provider: Annabella HULL CNP     Location [facility]   Service Date: 24            [facility]  New Visit Note      HISTORY OF PRESENT ILLNESS     The patient's referring provider is: No ref. provider found    HISTORY OF PRESENT ILLNESS   Jacquelyn Buenrostro is a 68 y.o. female with a history of HTN, HFpEF, High Grade AV Block s/p PPM  who is a never smoker, who presents to a [facility] for an initial  evaluation for granulomatous disease.     I have independently interviewed and examined the patient in the office and reviewed available records.    Current History    Since last visit she reports her respiratory status is stable. She just gets WARE with walking long time and over exertion and stairs.  She fell 5 weeks ago while walking outside for exercise.  She has a bronch scheduled for 2024 for concerns of sarcoidosis.  Discussed bronchoscopy procedure with her today.    3/1/24: On today's visit, the patient reports occasional dry cough. Occasional mild chest discomfort, follows with cardiology. Reports having dizziness after started her BP medications but reports it has been better. She denies vision issues, had cataract surgery in October and November, no more vision issues since. No skin rashes, papules, nodules or plaques. No muscle weakness. Denies wheezing, fevers, chills, WARE, SOB at rest, and ER visits for breathing issues.     # Sarcoidosis Multi-organ involvement evaluation:  1) General: no fever, chills, asthenia, fatigue or weight loss.  2) Neuro: No sign of neurosarcoidosis.  3) Eyes: no hx of uveitis, retinal vascular change or conjunctival nodules. Yearly ophthalmology appointment.  4) ADP: no peripheral adp noted on exam today  5) skin: no hx of erythema nodosum, lupus pernio, papules, nodules, plaques or scar  6) Liver: no transaminitis.  Year LFTs.  7) Heart: yearly ECG, and echo  8) Kidneys: Will check Creat, Ca blood and 24hr urine and 1.25OH vitamin D  9) MSK: no muscle weakness  10) GI/: very uncommon involvement, no clinic signs     Previous pulmonary history: She has no history of recurrent infections, or lung disease as a child.  He had no previous lung hx, never on oxygen or inhaler therapy.     Inhalers/nebulized medications: none    Hospitalization History: She has not been hospitalized over the last year for breathing related problem.    Sleep history: Denies snoring, apnea, feeling tired during the day or taking naps during the day.     ALLERGIES AND MEDICATIONS     ALLERGIES  No Active Allergies      MEDICATIONS  Current Outpatient Medications   Medication Sig Dispense Refill    amLODIPine (Norvasc) 5 mg tablet Take 1 tablet (5 mg) by mouth once daily.      carvedilol (Coreg) 25 mg tablet Take 2 tablets (50 mg) by mouth 2 times a day.      furosemide (Lasix) 20 mg tablet Take 1 tablet (20 mg) by mouth once daily.      lisinopril 10 mg tablet Take 1 tablet (10 mg) by mouth once daily.      melatonin 10 mg tablet Take 1 tablet (10 mg) by mouth once daily.      multivitamin-min-FA-ginkgo (One Daily Women 50 Plus) 400-120 mcg-mg tablet Take 1 tablet by mouth once daily.      simvastatin (Zocor) 20 mg tablet Take 1 tablet (20 mg) by mouth once daily.      vit C/E/zinc/lutein/zeaxanthin (OCUVITE EYE HEALTH ORAL) Take 1 tablet by mouth once daily.       No current facility-administered medications for this visit.         PAST HISTORY     PAST MEDICAL HISTORY  HTN  HFpEF  High-grade AV block s/p pacemaker    PAST SURGICAL HISTORY  Past Surgical History:   Procedure Laterality Date    CARDIAC CATHETERIZATION      CATARACT EXTRACTION W/  INTRAOCULAR LENS IMPLANT Bilateral 10/11/2023    Dr Amaro    INSERT / REPLACE / REMOVE PACEMAKER  09/07/2022       IMMUNIZATION HISTORY  Immunization History   Administered Date(s) Administered    Flu vaccine  (IIV4), preservative free *Check age/dose* 10/09/2017, 10/10/2018, 10/12/2020    Flu vaccine, quadrivalent, high-dose, preservative free, age 65y+ (FLUZONE) 11/10/2023    Flu vaccine, quadrivalent, no egg protein, age 6 month or greater (FLUCELVAX) 11/15/2021    Influenza, Seasonal, Quadrivalent, Adjuvanted 09/30/2022    Influenza, injectable, MDCK, quadrivalent 10/21/2019    Influenza, seasonal, injectable 10/24/2016    Pfizer COVID-19 vaccine, bivalent, age 12 years and older (30 mcg/0.3 mL) 10/20/2022    Pfizer Purple Cap SARS-CoV-2 04/05/2021, 04/30/2021, 12/01/2021    Pneumococcal conjugate vaccine, 20-valent (PREVNAR 20) 12/05/2023    Tdap vaccine, age 7 year and older (BOOSTRIX, ADACEL) 11/15/2022       SOCIAL HISTORY  Tobacco Smoking: never  Illicit drugs: none  Alcohol consumption: none   Pets: 2 cockatiels    OCCUPATIONAL/ENVIRONMENTAL HISTORY  Occupation: Senior Caregiver  No known exposure to asbestos, silica, beryllium or inhaled metals.   Has 2 cockatiels      FAMILY HISTORY  Family History   Problem Relation Name Age of Onset    Heart disease Mother      Breast cancer Mother      Cancer Mother      Macular degeneration Mother      Cancer Father      Breast cancer Sister  36    Cancer Brother      Cancer Other Multiple Family Member      No family history of pulmonary disease.  Sister - Breast cancer   Mother- breast cancer  Father - leukemia  Sister - unknown autoimmune disorders.    REVIEW OF SYSTEMS     REVIEW OF SYSTEMS  Review of Systems    Constitutional: No fever, no chills, no night sweats.    Eyes: No double vision, no floaters, no dry eyes.   ENT: See HPI.   Neck: No neck stiffness.  Cardiovascular: No sharp chest pain, no heart racing, no leg swelling.  Respiratory: as noted in HPI.   Gastrointestinal: No nausea, no vomiting, no diarrhea.   Musculoskeletal: No joint pain, no back pain.   Integumentary: No rashes or sores.  Neurological: No dizziness, no headaches. Sleeping  well.  Psychiatric: No mood changes.   Endocrine: No hot flashes, no cold intolerance, weight is stable.  Hematologic: No easy bruising or bleeding.    PHYSICAL EXAM     VITAL SIGNS:   There were no vitals filed for this visit.        CURRENT WEIGHT: There is no height or weight on file to calculate BMI.    PREVIOUS WEIGHTS:  Wt Readings from Last 3 Encounters:   05/02/24 113 kg (248 lb 6.4 oz)   04/18/24 111 kg (244 lb)   04/18/24 111 kg (244 lb 9.6 oz)       Physical Exam    Constitutional: General appearance: Alert and oriented.  No acute distress. Well developed, well nourished.  Head and face: Symmetric  ENT: external inspection of ear and nose normal. No intranasal polyps. No oropharyngeal exudates.    Oropharynx: normal   Neck: supple, no lymphadenopathy  Pulmonary: Chest is normal. No increased work of breathing or signs of respiratory distress. Clear to auscultation bilaterally - no crackles, wheezing, or rhonchi.   Cardiovascular: Heart rate and rhythm normal. Normal S1, S2 - no murmurs, gallops, or pericardial rub.   Abdomen: Soft, non tender, +BS  Extremities: No edema. No clubbing or cyanosis of the fingernails.    Neurologic: Moves all four extremities   MSK: Normal movements of extremities. Gait normal   Psychiatric: Intact judgement and insight.    RESULTS/DATA     Pulmonary Function Test Results            Study Result    Narrative & Impression   The FEV1/FVC is normal. The FEV1 is normal. The FVC is normal. Following administration of bronchodilators, there is no significant response. The DLCO is normal; however, the diffusing capacity was not corrected for the patient's hemoglobin. Conclusion: Normal spirometry. The DLCO is normal.                                   Pulmonary Function Test - Scan on 3/17/2024  1:29 PM                          Chest Radiograph     XR chest 2 view 09/02/2022    Narrative  MRN: [id number]  Patient Name: MALKA COKER    STUDY:  TH CHEST 2 VIEW PA AND  LAT;    INDICATION:  S/P pacer .    COMPARISON:  09/01/2022 and 08/30/2022    ACCESSION NUMBER(S):  [id number]    ORDERING CLINICIAN:  KEI VILLANUEVA    FINDINGS:  Left subclavian AICD/pacemaker device noted.  The cardiac silhouette size is within normal limits.  Persistent infiltrates in the right upper and bilateral lower lung  zones.  No pneumothorax.  No acute osseous abnormality.    Impression  Persistent bilateral lung infiltrates with pneumonia not excluded.  Continued imaging follow-up is suggested.      Chest CT Scan     STUDY:  CT CHEST WO IV CONTRAST;  3/22/2024 11:11 am      INDICATION:  Signs/Symptoms:sarcoid.      COMPARISON:  PET-CT dated 01/18/2024.      ACCESSION NUMBER(S):  [id number]      ORDERING CLINICIAN:  DOMI LOPEZ      TECHNIQUE:  Helical data acquisition of the chest was obtained  without IV  contrast material.  Images were reformatted in axial, coronal, and  sagittal planes.      FINDINGS:  LUNGS AND AIRWAYS:  The trachea and central airways are patent. No endobronchial lesion.      There is mosaic attenuation of the bilateral lungs, likely relating  to small airway disease. There are reticular and bandlike opacities  scattered throughout the bilateral lungs likely reflecting a  combination of subsegmental atelectasis and/or fibrosis. There is  biapical pleuroparenchymal scarring.      There scattered areas of tree-in-bud nodularity predominantly  visualized within the right middle lobe and right upper lobe as  annotated in PACS. No focal consolidation, pleural effusion, or  pneumothorax. No discrete suspicious solid pulmonary nodules.      MEDIASTINUM AND NEVAEH, LOWER NECK AND AXILLA:  The visualized thyroid gland is within normal limits.      There are numerous calcified mediastinal and hilar lymph nodes likely  reflecting sequela of prior granulomatous infection. No thoracic  lymphadenopathy by CT size criteria.      Unchanged small hiatal hernia. The esophagus is  otherwise  unremarkable in appearance.      HEART AND VESSELS:  The thoracic aorta is of normal course and caliber with mild vascular  calcifications.      Main pulmonary artery and its branches are normal in caliber.      No coronary artery calcifications are seen. The study is not  optimized for evaluation of coronary arteries.      The cardiac chambers are not enlarged. Stable positioning of a left  chest wall AICD/pacemaker with leads terminating in the right atrium  and right ventricle. Coarse mitral annular calcifications are noted.      No evidence of pericardial effusion.      UPPER ABDOMEN:  The visualized subdiaphragmatic structures demonstrate no remarkable  findings.      CHEST WALL AND OSSEOUS STRUCTURES:  The chest wall soft tissues are unremarkable. No acute osseous  abnormalities or suspicious osseous lesions.      IMPRESSION:  1. Scattered areas of tree-in-bud nodularity predominantly visualized  within the right middle lobe and right upper lobe. Findings are  compatible with an infectious/inflammatory bronchiolitis.  2. Mosaic attenuation of the bilateral lungs likely reflects sequela  of small airway disease.  3. Calcified mediastinal and hilar lymph nodes compatible with  sequela of prior granulomatous infection.  4. Unchanged small hiatal hernia.      I personally reviewed the images/study and I agree with the resident  findings as stated. This study was interpreted at [facility], [place], [place].      MACRO:  None      Echocardiogram     Echocardiogram     Narrative  [facility]  [address] [address] [address]  Tel [phone] Fax [phone]    TRANSTHORACIC ECHOCARDIOGRAM REPORT      Patient Name:     MALKA Kuo Physician:  [id number] Talat COKER  Study Date:       6/20/2023     Referring           TERENCE OLVERA  Physician:  MRN/PID:          [id number]      PCP:                [id number] Kaye  Que RICHARDSON  Accession/Order#: [id number]  Department          [facility]  Location:  YOB: 1955    Fellow:  Gender:           F             Nurse:  Admit Date:                     Sonographer:        Lesly Nye [facility]  Admission Status: Outpatient    Additional Staff:  Height:           165.10 cm     CC Report to:  Weight:           105.69 kg     Study Type:         Echocardiogram  BSA:              2.11 m2  Blood Pressure: 137 /83 mmHg    Diagnosis/ICD: I42.2-Other hypertrophic cardiomyopathy  Indication:    HOCM  Procedure/CPT: Echo Complete w Full [id number]    Patient History:  BMI:               Obese >30  Pacer/Defib:       AICD  Pertinent History: HTN and Hyperlipidemia. HOCM; Previous echocardiogram  08-31-22.    Study Detail: The following Echo studies were performed: 2D, M-Mode, Doppler and  color flow.      PHYSICIAN INTERPRETATION:  Left Ventricle: Left ventricular systolic function is normal, with an estimated ejection fraction of 65-70%. There are no regional wall motion abnormalities. The left ventricular cavity size is mildly dilated. The left ventricular septal wall thickness is mildly increased. There is mildly increased left ventricular posterior wall thickness. There is mild concentric left ventricular hypertrophy. Spectral Doppler shows an impaired relaxation pattern of left ventricular diastolic filling.  Left Atrium: The left atrium is mildly dilated. There is no evidence of a patent foramen ovale.  Right Ventricle: The right ventricle is normal in size. There is normal right ventricular global systolic function.  Right Atrium: The right atrium is normal in size.  Aortic Valve: The aortic valve is trileaflet. There is trivial aortic valve regurgitation. The peak instantaneous gradient of the aortic valve is 13.8 mmHg. The mean gradient of the aortic valve is 8.0 mmHg.  Mitral Valve: The mitral valve is normal in structure. There is mild mitral valve  regurgitation.  Tricuspid Valve: The tricuspid valve is structurally normal. There is trace tricuspid regurgitation. The Doppler estimated RVSP is within normal limits at 23.1 mmHg.  Pulmonic Valve: The pulmonic valve is structurally normal. There is no indication of pulmonic valve regurgitation.  Pericardium: There is no pericardial effusion noted.  Aorta: The aortic root is normal.      CONCLUSIONS:  1. Left ventricular systolic function is normal with a 65-70% estimated ejection fraction.  2. Spectral Doppler shows an impaired relaxation pattern of left ventricular diastolic filling.  3. Mild mitral valve regurgitation.  4. RVSP within normal limits.  5. There is no evidence of a patent foramen ovale.    QUANTITATIVE DATA SUMMARY:  2D MEASUREMENTS:  Normal Ranges:  LAs:           4.60 cm    (2.7-4.0cm)  IVSd:          1.34 cm    (0.6-1.1cm)  LVPWd:         1.27 cm    (0.6-1.1cm)  LVIDd:         5.82 cm    (3.9-5.9cm)  LVIDs:         3.10 cm  LV Mass Index: 158.7 g/m2  LV % FS        46.7 %    LA VOLUME:  Normal Ranges:  LA Vol A4C:        63.4 ml    (22+/-6mL/m2)  LA Vol A2C:        74.3 ml  LA Vol BP:         69.7 ml  LA Vol Index A4C:  30.0ml/m2  LA Vol Index A2C:  35.2 ml/m2  LA Vol Index BP:   33.0 ml/m2  LA Area A4C:       21.5 cm2  LA Area A2C:       22.9 cm2  LA Major Axis A4C: 6.2 cm  LA Major Axis A2C: 6.0 cm  LA Volume Index:   34.1 ml/m2  LA Vol A4C:        58.0 ml  LA Vol A2C:        72.0 ml    M-MODE MEASUREMENTS:  Normal Ranges:  Ao Root: 3.40 cm (2.0-3.7cm)    AORTA MEASUREMENTS:  Normal Ranges:  Ao Sinus, d: 3.00 cm (2.1-3.5cm)  Ao STJ, d:   2.90 cm (1.7-3.4cm)  Asc Ao, d:   3.50 cm (2.1-3.4cm)    LV SYSTOLIC FUNCTION BY 2D PLANIMETRY (MOD):  Normal Ranges:  EF-A4C View: 63.0 % (>=55%)  EF-A2C View: 58.2 %  EF-Biplane:  60.0 %    LV DIASTOLIC FUNCTION:  Normal Ranges:  MV Peak E:        0.51 m/s    (0.7-1.2 m/s)  MV Peak A:        0.98 m/s    (0.42-0.7 m/s)  E/A Ratio:        0.52         "(1.0-2.2)  MV lateral e'     0.06 m/s  MV medial e'      0.05 m/s  E/e' Ratio:       8.50        (<8.0)  PulmV Sys Rusty:    63.20 cm/s  PulmV Ervin Rusty:   40.30 cm/s  PulmV A Revs Rusty: 25.10 cm/s  PulmV A Revs Dur: 129.00 msec    MITRAL VALVE:  Normal Ranges:  MV Vmax:    0.98 m/s (<=1.3m/s)  MV peak PG: 3.8 mmHg (<5mmHg)  MV mean P.0 mmHg (<48mmHg)  MV DT:      362 msec (150-240msec)    AORTIC VALVE:  Normal Ranges:  AoV Vmax:                1.86 m/s  (<=1.7m/s)  AoV Peak P.8 mmHg (<20mmHg)  AoV Mean P.0 mmHg  (1.7-11.5mmHg)  LVOT Max Rusty:            0.95 m/s  (<=1.1m/s)  AoV VTI:                 42.60 cm  (18-25cm)  LVOT VTI:                21.60 cm  LVOT Diameter:           2.00 cm   (1.8-2.4cm)  AoV Area, VTI:           1.59 cm2  (2.5-5.5cm2)  AoV Area,Vmax:           1.60 cm2  (2.5-4.5cm2)  AoV Dimensionless Index: 0.51      RIGHT VENTRICLE:  RV 1   3.3 cm  RV 2   3.4 cm  RV 3   7.7 cm  TAPSE: 20.0 mm  RV s'  0.14 m/s    TRICUSPID VALVE/RVSP:  Normal Ranges:  Peak TR Velocity: 2.24 m/s  RV Syst Pressure: 23.1 mmHg (< 30mmHg)    PULMONIC VALVE:  Normal Ranges:  PV Accel Time: 82 msec  (>120ms)  PV Max Rusty:    1.1 m/s  (0.6-0.9m/s)  PV Max P.9 mmHg    Pulmonary Veins:  PulmV A Revs Dur: 129.00 msec  PulmV A Revs Rusty: 25.10 cm/s  PulmV Ervin Rusty:   40.30 cm/s  PulmV Sys Rusty:    63.20 cm/s      [id number] Talat Tavares MD  Electronically signed on 2023 at 4:39:58 PM         Labwork   Complete Blood Count  Lab Results   Component Value Date    WBC 9.5 2024    HGB 11.4 (L) 2024    HCT 38.3 2024    MCV 96 2024     2024       Peripheral Eosinophil Count/Percentage:   Eosinophils Absolute (x10*3/uL)   Date Value   2024 0.23     Eosinophils % (%)   Date Value   2024 2.7       Serum Immunoglobulin E:    No results found for: \"IGE\"     ASSESSMENT/PLAN   Ms. Buenrostro is a 68 y.o. female, with a history of HTN, HFpEF, High Grade AV Block " s/p PPM  who is a never smoker, who presents to a [facility] for an initial  evaluation for granulomatous disease.     Will work up for systemic sarcoid   Cardiology will need to workup for myocarditis      Problem List and Orders  Problem List Items Addressed This Visit    None        Assessment and Plan / Recommendations:  Problem List Items Addressed This Visit    None    Patient's visit was converted to a telephone visit given current COVID- 19 pandemic.     1. EBUS with a transbronchial biopsy for concerns of sarcoidosis  - plan for bronchoscopy with biopsy   - labwork prior to procedure    - Not on any anticoagulation     I explained the procedure to the patient. We discussed that the bronchoscopy will be performed by a member of the Interventional Pulmonary Team (Dr Santosh Linda,  Dr. Maggie Price, or Dr. Alban Beyer) depending on scheduling and provider availability. We also discussed that the IP providers function as a team and not infrequently may have to fill in for one another if there are emergent issues that need attention at the same time. The patient / family expressed understanding and agreed to proceed. All questions were answered.     Thank you for visiting the Pulmonary clinic today!   Annabella Sullivan CNP  [phone]        Follow up with Dr. Aguila , Dr Mendoza or Dr Vaibhav Theodore after bronchoscopy    If you have any questions please call the office [phone]    Thank you for visiting the Pulmonary clinic today!   Annabella Sullivan CNP  [phone]

## 2024-05-13 NOTE — H&P (VIEW-ONLY)
Patient: Jacquelyn Buenrostro    [id number]  : 1955 -- AGE 68 y.o.    Provider: Annabella HULL CNP     Location [facility]   Service Date: 24            [facility]  New Visit Note      HISTORY OF PRESENT ILLNESS     The patient's referring provider is: No ref. provider found    HISTORY OF PRESENT ILLNESS   Jacquelyn Buenrostro is a 68 y.o. female with a history of HTN, HFpEF, High Grade AV Block s/p PPM  who is a never smoker, who presents to a [facility] for an initial  evaluation for granulomatous disease.     I have independently interviewed and examined the patient in the office and reviewed available records.    Current History    Since last visit she reports her respiratory status is stable. She just gets WARE with walking long time and over exertion and stairs.  She fell 5 weeks ago while walking outside for exercise.  She has a bronch scheduled for 2024 for concerns of sarcoidosis.  Discussed bronchoscopy procedure with her today.    3/1/24: On today's visit, the patient reports occasional dry cough. Occasional mild chest discomfort, follows with cardiology. Reports having dizziness after started her BP medications but reports it has been better. She denies vision issues, had cataract surgery in October and November, no more vision issues since. No skin rashes, papules, nodules or plaques. No muscle weakness. Denies wheezing, fevers, chills, WARE, SOB at rest, and ER visits for breathing issues.     # Sarcoidosis Multi-organ involvement evaluation:  1) General: no fever, chills, asthenia, fatigue or weight loss.  2) Neuro: No sign of neurosarcoidosis.  3) Eyes: no hx of uveitis, retinal vascular change or conjunctival nodules. Yearly ophthalmology appointment.  4) ADP: no peripheral adp noted on exam today  5) skin: no hx of erythema nodosum, lupus pernio, papules, nodules, plaques or scar  6) Liver: no transaminitis.  Year LFTs.  7) Heart: yearly ECG, and echo  8) Kidneys: Will check Creat, Ca blood and 24hr urine and 1.25OH vitamin D  9) MSK: no muscle weakness  10) GI/: very uncommon involvement, no clinic signs     Previous pulmonary history: She has no history of recurrent infections, or lung disease as a child.  He had no previous lung hx, never on oxygen or inhaler therapy.     Inhalers/nebulized medications: none    Hospitalization History: She has not been hospitalized over the last year for breathing related problem.    Sleep history: Denies snoring, apnea, feeling tired during the day or taking naps during the day.     ALLERGIES AND MEDICATIONS     ALLERGIES  No Active Allergies      MEDICATIONS  Current Outpatient Medications   Medication Sig Dispense Refill    amLODIPine (Norvasc) 5 mg tablet Take 1 tablet (5 mg) by mouth once daily.      carvedilol (Coreg) 25 mg tablet Take 2 tablets (50 mg) by mouth 2 times a day.      furosemide (Lasix) 20 mg tablet Take 1 tablet (20 mg) by mouth once daily.      lisinopril 10 mg tablet Take 1 tablet (10 mg) by mouth once daily.      melatonin 10 mg tablet Take 1 tablet (10 mg) by mouth once daily.      multivitamin-min-FA-ginkgo (One Daily Women 50 Plus) 400-120 mcg-mg tablet Take 1 tablet by mouth once daily.      simvastatin (Zocor) 20 mg tablet Take 1 tablet (20 mg) by mouth once daily.      vit C/E/zinc/lutein/zeaxanthin (OCUVITE EYE HEALTH ORAL) Take 1 tablet by mouth once daily.       No current facility-administered medications for this visit.         PAST HISTORY     PAST MEDICAL HISTORY  HTN  HFpEF  High-grade AV block s/p pacemaker    PAST SURGICAL HISTORY  Past Surgical History:   Procedure Laterality Date    CARDIAC CATHETERIZATION      CATARACT EXTRACTION W/  INTRAOCULAR LENS IMPLANT Bilateral 10/11/2023    Dr Amaro    INSERT / REPLACE / REMOVE PACEMAKER  09/07/2022       IMMUNIZATION HISTORY  Immunization History   Administered Date(s) Administered    Flu vaccine  (IIV4), preservative free *Check age/dose* 10/09/2017, 10/10/2018, 10/12/2020    Flu vaccine, quadrivalent, high-dose, preservative free, age 65y+ (FLUZONE) 11/10/2023    Flu vaccine, quadrivalent, no egg protein, age 6 month or greater (FLUCELVAX) 11/15/2021    Influenza, Seasonal, Quadrivalent, Adjuvanted 09/30/2022    Influenza, injectable, MDCK, quadrivalent 10/21/2019    Influenza, seasonal, injectable 10/24/2016    Pfizer COVID-19 vaccine, bivalent, age 12 years and older (30 mcg/0.3 mL) 10/20/2022    Pfizer Purple Cap SARS-CoV-2 04/05/2021, 04/30/2021, 12/01/2021    Pneumococcal conjugate vaccine, 20-valent (PREVNAR 20) 12/05/2023    Tdap vaccine, age 7 year and older (BOOSTRIX, ADACEL) 11/15/2022       SOCIAL HISTORY  Tobacco Smoking: never  Illicit drugs: none  Alcohol consumption: none   Pets: 2 cockatiels    OCCUPATIONAL/ENVIRONMENTAL HISTORY  Occupation: Senior Caregiver  No known exposure to asbestos, silica, beryllium or inhaled metals.   Has 2 cockatiels      FAMILY HISTORY  Family History   Problem Relation Name Age of Onset    Heart disease Mother      Breast cancer Mother      Cancer Mother      Macular degeneration Mother      Cancer Father      Breast cancer Sister  36    Cancer Brother      Cancer Other Multiple Family Member      No family history of pulmonary disease.  Sister - Breast cancer   Mother- breast cancer  Father - leukemia  Sister - unknown autoimmune disorders.    REVIEW OF SYSTEMS     REVIEW OF SYSTEMS  Review of Systems    Constitutional: No fever, no chills, no night sweats.    Eyes: No double vision, no floaters, no dry eyes.   ENT: See HPI.   Neck: No neck stiffness.  Cardiovascular: No sharp chest pain, no heart racing, no leg swelling.  Respiratory: as noted in HPI.   Gastrointestinal: No nausea, no vomiting, no diarrhea.   Musculoskeletal: No joint pain, no back pain.   Integumentary: No rashes or sores.  Neurological: No dizziness, no headaches. Sleeping  well.  Psychiatric: No mood changes.   Endocrine: No hot flashes, no cold intolerance, weight is stable.  Hematologic: No easy bruising or bleeding.    PHYSICAL EXAM     VITAL SIGNS:   There were no vitals filed for this visit.        CURRENT WEIGHT: There is no height or weight on file to calculate BMI.    PREVIOUS WEIGHTS:  Wt Readings from Last 3 Encounters:   05/02/24 113 kg (248 lb 6.4 oz)   04/18/24 111 kg (244 lb)   04/18/24 111 kg (244 lb 9.6 oz)       Physical Exam    Constitutional: General appearance: Alert and oriented.  No acute distress. Well developed, well nourished.  Head and face: Symmetric  ENT: external inspection of ear and nose normal. No intranasal polyps. No oropharyngeal exudates.    Oropharynx: normal   Neck: supple, no lymphadenopathy  Pulmonary: Chest is normal. No increased work of breathing or signs of respiratory distress. Clear to auscultation bilaterally - no crackles, wheezing, or rhonchi.   Cardiovascular: Heart rate and rhythm normal. Normal S1, S2 - no murmurs, gallops, or pericardial rub.   Abdomen: Soft, non tender, +BS  Extremities: No edema. No clubbing or cyanosis of the fingernails.    Neurologic: Moves all four extremities   MSK: Normal movements of extremities. Gait normal   Psychiatric: Intact judgement and insight.    RESULTS/DATA     Pulmonary Function Test Results            Study Result    Narrative & Impression   The FEV1/FVC is normal. The FEV1 is normal. The FVC is normal. Following administration of bronchodilators, there is no significant response. The DLCO is normal; however, the diffusing capacity was not corrected for the patient's hemoglobin. Conclusion: Normal spirometry. The DLCO is normal.                                   Pulmonary Function Test - Scan on 3/17/2024  1:29 PM                          Chest Radiograph     XR chest 2 view 09/02/2022    Narrative  MRN: [id number]  Patient Name: MALKA COKER    STUDY:  TH CHEST 2 VIEW PA AND  LAT;    INDICATION:  S/P pacer .    COMPARISON:  09/01/2022 and 08/30/2022    ACCESSION NUMBER(S):  [id number]    ORDERING CLINICIAN:  KEI VILLANUEVA    FINDINGS:  Left subclavian AICD/pacemaker device noted.  The cardiac silhouette size is within normal limits.  Persistent infiltrates in the right upper and bilateral lower lung  zones.  No pneumothorax.  No acute osseous abnormality.    Impression  Persistent bilateral lung infiltrates with pneumonia not excluded.  Continued imaging follow-up is suggested.      Chest CT Scan     STUDY:  CT CHEST WO IV CONTRAST;  3/22/2024 11:11 am      INDICATION:  Signs/Symptoms:sarcoid.      COMPARISON:  PET-CT dated 01/18/2024.      ACCESSION NUMBER(S):  [id number]      ORDERING CLINICIAN:  DOMI LOPEZ      TECHNIQUE:  Helical data acquisition of the chest was obtained  without IV  contrast material.  Images were reformatted in axial, coronal, and  sagittal planes.      FINDINGS:  LUNGS AND AIRWAYS:  The trachea and central airways are patent. No endobronchial lesion.      There is mosaic attenuation of the bilateral lungs, likely relating  to small airway disease. There are reticular and bandlike opacities  scattered throughout the bilateral lungs likely reflecting a  combination of subsegmental atelectasis and/or fibrosis. There is  biapical pleuroparenchymal scarring.      There scattered areas of tree-in-bud nodularity predominantly  visualized within the right middle lobe and right upper lobe as  annotated in PACS. No focal consolidation, pleural effusion, or  pneumothorax. No discrete suspicious solid pulmonary nodules.      MEDIASTINUM AND NEVAEH, LOWER NECK AND AXILLA:  The visualized thyroid gland is within normal limits.      There are numerous calcified mediastinal and hilar lymph nodes likely  reflecting sequela of prior granulomatous infection. No thoracic  lymphadenopathy by CT size criteria.      Unchanged small hiatal hernia. The esophagus is  otherwise  unremarkable in appearance.      HEART AND VESSELS:  The thoracic aorta is of normal course and caliber with mild vascular  calcifications.      Main pulmonary artery and its branches are normal in caliber.      No coronary artery calcifications are seen. The study is not  optimized for evaluation of coronary arteries.      The cardiac chambers are not enlarged. Stable positioning of a left  chest wall AICD/pacemaker with leads terminating in the right atrium  and right ventricle. Coarse mitral annular calcifications are noted.      No evidence of pericardial effusion.      UPPER ABDOMEN:  The visualized subdiaphragmatic structures demonstrate no remarkable  findings.      CHEST WALL AND OSSEOUS STRUCTURES:  The chest wall soft tissues are unremarkable. No acute osseous  abnormalities or suspicious osseous lesions.      IMPRESSION:  1. Scattered areas of tree-in-bud nodularity predominantly visualized  within the right middle lobe and right upper lobe. Findings are  compatible with an infectious/inflammatory bronchiolitis.  2. Mosaic attenuation of the bilateral lungs likely reflects sequela  of small airway disease.  3. Calcified mediastinal and hilar lymph nodes compatible with  sequela of prior granulomatous infection.  4. Unchanged small hiatal hernia.      I personally reviewed the images/study and I agree with the resident  findings as stated. This study was interpreted at [facility], [place], [place].      MACRO:  None      Echocardiogram     Echocardiogram     Narrative  [facility]  [address] [address] [address]  Tel [phone] Fax [phone]    TRANSTHORACIC ECHOCARDIOGRAM REPORT      Patient Name:     MALKA Kuo Physician:  [id number] Talat COKER  Study Date:       6/20/2023     Referring           TERENCE OLVERA  Physician:  MRN/PID:          [id number]      PCP:                [id number] Kaye  Que RICHARDSON  Accession/Order#: [id number]  Department          [facility]  Location:  YOB: 1955    Fellow:  Gender:           F             Nurse:  Admit Date:                     Sonographer:        Lesly Nye [facility]  Admission Status: Outpatient    Additional Staff:  Height:           165.10 cm     CC Report to:  Weight:           105.69 kg     Study Type:         Echocardiogram  BSA:              2.11 m2  Blood Pressure: 137 /83 mmHg    Diagnosis/ICD: I42.2-Other hypertrophic cardiomyopathy  Indication:    HOCM  Procedure/CPT: Echo Complete w Full Doppler-[id number]    Patient History:  BMI:               Obese >30  Pacer/Defib:       AICD  Pertinent History: HTN and Hyperlipidemia. HOCM; Previous echocardiogram  08-31-22.    Study Detail: The following Echo studies were performed: 2D, M-Mode, Doppler and  color flow.      PHYSICIAN INTERPRETATION:  Left Ventricle: Left ventricular systolic function is normal, with an estimated ejection fraction of 65-70%. There are no regional wall motion abnormalities. The left ventricular cavity size is mildly dilated. The left ventricular septal wall thickness is mildly increased. There is mildly increased left ventricular posterior wall thickness. There is mild concentric left ventricular hypertrophy. Spectral Doppler shows an impaired relaxation pattern of left ventricular diastolic filling.  Left Atrium: The left atrium is mildly dilated. There is no evidence of a patent foramen ovale.  Right Ventricle: The right ventricle is normal in size. There is normal right ventricular global systolic function.  Right Atrium: The right atrium is normal in size.  Aortic Valve: The aortic valve is trileaflet. There is trivial aortic valve regurgitation. The peak instantaneous gradient of the aortic valve is 13.8 mmHg. The mean gradient of the aortic valve is 8.0 mmHg.  Mitral Valve: The mitral valve is normal in structure. There is mild mitral valve  regurgitation.  Tricuspid Valve: The tricuspid valve is structurally normal. There is trace tricuspid regurgitation. The Doppler estimated RVSP is within normal limits at 23.1 mmHg.  Pulmonic Valve: The pulmonic valve is structurally normal. There is no indication of pulmonic valve regurgitation.  Pericardium: There is no pericardial effusion noted.  Aorta: The aortic root is normal.      CONCLUSIONS:  1. Left ventricular systolic function is normal with a 65-70% estimated ejection fraction.  2. Spectral Doppler shows an impaired relaxation pattern of left ventricular diastolic filling.  3. Mild mitral valve regurgitation.  4. RVSP within normal limits.  5. There is no evidence of a patent foramen ovale.    QUANTITATIVE DATA SUMMARY:  2D MEASUREMENTS:  Normal Ranges:  LAs:           4.60 cm    (2.7-4.0cm)  IVSd:          1.34 cm    (0.6-1.1cm)  LVPWd:         1.27 cm    (0.6-1.1cm)  LVIDd:         5.82 cm    (3.9-5.9cm)  LVIDs:         3.10 cm  LV Mass Index: 158.7 g/m2  LV % FS        46.7 %    LA VOLUME:  Normal Ranges:  LA Vol A4C:        63.4 ml    (22+/-6mL/m2)  LA Vol A2C:        74.3 ml  LA Vol BP:         69.7 ml  LA Vol Index A4C:  30.0ml/m2  LA Vol Index A2C:  35.2 ml/m2  LA Vol Index BP:   33.0 ml/m2  LA Area A4C:       21.5 cm2  LA Area A2C:       22.9 cm2  LA Major Axis A4C: 6.2 cm  LA Major Axis A2C: 6.0 cm  LA Volume Index:   34.1 ml/m2  LA Vol A4C:        58.0 ml  LA Vol A2C:        72.0 ml    M-MODE MEASUREMENTS:  Normal Ranges:  Ao Root: 3.40 cm (2.0-3.7cm)    AORTA MEASUREMENTS:  Normal Ranges:  Ao Sinus, d: 3.00 cm (2.1-3.5cm)  Ao STJ, d:   2.90 cm (1.7-3.4cm)  Asc Ao, d:   3.50 cm (2.1-3.4cm)    LV SYSTOLIC FUNCTION BY 2D PLANIMETRY (MOD):  Normal Ranges:  EF-A4C View: 63.0 % (>=55%)  EF-A2C View: 58.2 %  EF-Biplane:  60.0 %    LV DIASTOLIC FUNCTION:  Normal Ranges:  MV Peak E:        0.51 m/s    (0.7-1.2 m/s)  MV Peak A:        0.98 m/s    (0.42-0.7 m/s)  E/A Ratio:        0.52         "(1.0-2.2)  MV lateral e'     0.06 m/s  MV medial e'      0.05 m/s  E/e' Ratio:       8.50        (<8.0)  PulmV Sys Rusty:    63.20 cm/s  PulmV Ervin Rusty:   40.30 cm/s  PulmV A Revs Rusty: 25.10 cm/s  PulmV A Revs Dur: 129.00 msec    MITRAL VALVE:  Normal Ranges:  MV Vmax:    0.98 m/s (<=1.3m/s)  MV peak PG: 3.8 mmHg (<5mmHg)  MV mean P.0 mmHg (<48mmHg)  MV DT:      362 msec (150-240msec)    AORTIC VALVE:  Normal Ranges:  AoV Vmax:                1.86 m/s  (<=1.7m/s)  AoV Peak P.8 mmHg (<20mmHg)  AoV Mean P.0 mmHg  (1.7-11.5mmHg)  LVOT Max Rusty:            0.95 m/s  (<=1.1m/s)  AoV VTI:                 42.60 cm  (18-25cm)  LVOT VTI:                21.60 cm  LVOT Diameter:           2.00 cm   (1.8-2.4cm)  AoV Area, VTI:           1.59 cm2  (2.5-5.5cm2)  AoV Area,Vmax:           1.60 cm2  (2.5-4.5cm2)  AoV Dimensionless Index: 0.51      RIGHT VENTRICLE:  RV 1   3.3 cm  RV 2   3.4 cm  RV 3   7.7 cm  TAPSE: 20.0 mm  RV s'  0.14 m/s    TRICUSPID VALVE/RVSP:  Normal Ranges:  Peak TR Velocity: 2.24 m/s  RV Syst Pressure: 23.1 mmHg (< 30mmHg)    PULMONIC VALVE:  Normal Ranges:  PV Accel Time: 82 msec  (>120ms)  PV Max Rusty:    1.1 m/s  (0.6-0.9m/s)  PV Max P.9 mmHg    Pulmonary Veins:  PulmV A Revs Dur: 129.00 msec  PulmV A Revs Rusty: 25.10 cm/s  PulmV Ervin Rusty:   40.30 cm/s  PulmV Sys Rusty:    63.20 cm/s      [id number] Talat Tavares MD  Electronically signed on 2023 at 4:39:58 PM         Labwork   Complete Blood Count  Lab Results   Component Value Date    WBC 9.5 2024    HGB 11.4 (L) 2024    HCT 38.3 2024    MCV 96 2024     2024       Peripheral Eosinophil Count/Percentage:   Eosinophils Absolute (x10*3/uL)   Date Value   2024 0.23     Eosinophils % (%)   Date Value   2024 2.7       Serum Immunoglobulin E:    No results found for: \"IGE\"     ASSESSMENT/PLAN   Ms. Buenrostro is a 68 y.o. female, with a history of HTN, HFpEF, High Grade AV Block " s/p PPM  who is a never smoker, who presents to a [facility] for an initial  evaluation for granulomatous disease.     Will work up for systemic sarcoid   Cardiology will need to workup for myocarditis      Problem List and Orders  Problem List Items Addressed This Visit    None        Assessment and Plan / Recommendations:  Problem List Items Addressed This Visit    None    Patient's visit was converted to a telephone visit given current COVID- 19 pandemic.     1. EBUS with a transbronchial biopsy for concerns of sarcoidosis  - plan for bronchoscopy with biopsy   - labwork prior to procedure    - Not on any anticoagulation     I explained the procedure to the patient. We discussed that the bronchoscopy will be performed by a member of the Interventional Pulmonary Team (Dr Santosh Linda,  Dr. Maggie Price, or Dr. Alban Beyer) depending on scheduling and provider availability. We also discussed that the IP providers function as a team and not infrequently may have to fill in for one another if there are emergent issues that need attention at the same time. The patient / family expressed understanding and agreed to proceed. All questions were answered.     Thank you for visiting the Pulmonary clinic today!   Annabella Sullivan CNP  [phone]        Follow up with Dr. Aguila , Dr Mendoza or Dr Vaibhav Theodore after bronchoscopy    If you have any questions please call the office [phone]    Thank you for visiting the Pulmonary clinic today!   Annabella Sullivan CNP  [phone]

## 2024-05-14 ENCOUNTER — TELEMEDICINE (OUTPATIENT)
Dept: PULMONOLOGY | Facility: CLINIC | Age: 69
End: 2024-05-14
Payer: COMMERCIAL

## 2024-05-14 DIAGNOSIS — Z01.811 PREOP PULMONARY/RESPIRATORY EXAM: Primary | ICD-10-CM

## 2024-05-14 PROCEDURE — 99214 OFFICE O/P EST MOD 30 MIN: CPT

## 2024-05-14 PROCEDURE — 1036F TOBACCO NON-USER: CPT

## 2024-05-14 PROCEDURE — 1159F MED LIST DOCD IN RCRD: CPT

## 2024-05-14 PROCEDURE — 1160F RVW MEDS BY RX/DR IN RCRD: CPT

## 2024-05-14 ASSESSMENT — ENCOUNTER SYMPTOMS
OCCASIONAL FEELINGS OF UNSTEADINESS: 0
DEPRESSION: 0
LOSS OF SENSATION IN FEET: 0

## 2024-05-14 ASSESSMENT — COLUMBIA-SUICIDE SEVERITY RATING SCALE - C-SSRS
2. HAVE YOU ACTUALLY HAD ANY THOUGHTS OF KILLING YOURSELF?: NO
6. HAVE YOU EVER DONE ANYTHING, STARTED TO DO ANYTHING, OR PREPARED TO DO ANYTHING TO END YOUR LIFE?: NO
1. IN THE PAST MONTH, HAVE YOU WISHED YOU WERE DEAD OR WISHED YOU COULD GO TO SLEEP AND NOT WAKE UP?: NO

## 2024-05-17 ENCOUNTER — HOSPITAL ENCOUNTER (OUTPATIENT)
Dept: GASTROENTEROLOGY | Facility: HOSPITAL | Age: 69
Setting detail: OUTPATIENT SURGERY
Discharge: HOME | End: 2024-05-17
Payer: COMMERCIAL

## 2024-05-17 ENCOUNTER — ANESTHESIA EVENT (OUTPATIENT)
Dept: GASTROENTEROLOGY | Facility: HOSPITAL | Age: 69
End: 2024-05-17
Payer: COMMERCIAL

## 2024-05-17 ENCOUNTER — ANESTHESIA (OUTPATIENT)
Dept: GASTROENTEROLOGY | Facility: HOSPITAL | Age: 69
End: 2024-05-17
Payer: COMMERCIAL

## 2024-05-17 VITALS
DIASTOLIC BLOOD PRESSURE: 63 MMHG | TEMPERATURE: 97.5 F | OXYGEN SATURATION: 95 % | HEIGHT: 65 IN | HEART RATE: 64 BPM | SYSTOLIC BLOOD PRESSURE: 120 MMHG | BODY MASS INDEX: 41.32 KG/M2 | RESPIRATION RATE: 18 BRPM | WEIGHT: 248 LBS

## 2024-05-17 DIAGNOSIS — J84.10 GRANULOMATOUS LUNG DISEASE (MULTI): ICD-10-CM

## 2024-05-17 PROBLEM — N18.9 CHRONIC RENAL INSUFFICIENCY: Status: ACTIVE | Noted: 2024-05-17

## 2024-05-17 LAB
ATRIAL RATE: 64 BPM
BASOPHILS NFR FLD MANUAL: 0 %
BLASTS NFR FLD MANUAL: 0 %
CLARITY FLD: CLEAR
COLOR FLD: COLORLESS
EOSINOPHIL NFR FLD MANUAL: 0 %
IMMATURE GRANULOCYTES IN FLUID: 0 %
LYMPHOCYTES NFR FLD MANUAL: 66 %
MONOS+MACROS NFR FLD MANUAL: 33 %
NEUTROPHILS NFR FLD MANUAL: 1 %
OTHER CELLS NFR FLD MANUAL: 0 %
P AXIS: 65 DEGREES
P OFFSET: 169 MS
P ONSET: 127 MS
PLASMA CELLS NFR FLD MANUAL: 0 %
PR INTERVAL: 124 MS
Q ONSET: 189 MS
QRS COUNT: 11 BEATS
QRS DURATION: 170 MS
QT INTERVAL: 466 MS
QTC CALCULATION(BAZETT): 480 MS
QTC FREDERICIA: 476 MS
R AXIS: -79 DEGREES
RBC # FLD AUTO: 1 /UL
T AXIS: 88 DEGREES
T OFFSET: 422 MS
TOTAL CELLS COUNTED FLD: 80
VENTRICULAR RATE: 64 BPM
WBC # FLD AUTO: 15 /UL

## 2024-05-17 PROCEDURE — 88172 CYTP DX EVAL FNA 1ST EA SITE: CPT | Performed by: PATHOLOGY

## 2024-05-17 PROCEDURE — 88312 SPECIAL STAINS GROUP 1: CPT | Performed by: PATHOLOGY

## 2024-05-17 PROCEDURE — 31625 BRONCHOSCOPY W/BIOPSY(S): CPT | Performed by: STUDENT IN AN ORGANIZED HEALTH CARE EDUCATION/TRAINING PROGRAM

## 2024-05-17 PROCEDURE — 88173 CYTOPATH EVAL FNA REPORT: CPT | Performed by: PATHOLOGY

## 2024-05-17 PROCEDURE — 2720000007 HC OR 272 NO HCPCS

## 2024-05-17 PROCEDURE — 88312 SPECIAL STAINS GROUP 1: CPT | Mod: TC,91,MCY | Performed by: STUDENT IN AN ORGANIZED HEALTH CARE EDUCATION/TRAINING PROGRAM

## 2024-05-17 PROCEDURE — 31628 BRONCHOSCOPY/LUNG BX EACH: CPT | Performed by: STUDENT IN AN ORGANIZED HEALTH CARE EDUCATION/TRAINING PROGRAM

## 2024-05-17 PROCEDURE — 87015 SPECIMEN INFECT AGNT CONCNTJ: CPT | Mod: 91 | Performed by: STUDENT IN AN ORGANIZED HEALTH CARE EDUCATION/TRAINING PROGRAM

## 2024-05-17 PROCEDURE — 7100000010 HC PHASE TWO TIME - EACH INCREMENTAL 1 MINUTE

## 2024-05-17 PROCEDURE — 87102 FUNGUS ISOLATION CULTURE: CPT | Performed by: STUDENT IN AN ORGANIZED HEALTH CARE EDUCATION/TRAINING PROGRAM

## 2024-05-17 PROCEDURE — 31624 DX BRONCHOSCOPE/LAVAGE: CPT | Performed by: STUDENT IN AN ORGANIZED HEALTH CARE EDUCATION/TRAINING PROGRAM

## 2024-05-17 PROCEDURE — 87205 SMEAR GRAM STAIN: CPT | Performed by: STUDENT IN AN ORGANIZED HEALTH CARE EDUCATION/TRAINING PROGRAM

## 2024-05-17 PROCEDURE — 3700000001 HC GENERAL ANESTHESIA TIME - INITIAL BASE CHARGE

## 2024-05-17 PROCEDURE — 88305 TISSUE EXAM BY PATHOLOGIST: CPT | Performed by: PATHOLOGY

## 2024-05-17 PROCEDURE — A31652 PR BRNCHSC EBUS GUIDED SAMPL 1/2 NODE STATION/STRUX

## 2024-05-17 PROCEDURE — 7100000009 HC PHASE TWO TIME - INITIAL BASE CHARGE

## 2024-05-17 PROCEDURE — 3700000002 HC GENERAL ANESTHESIA TIME - EACH INCREMENTAL 1 MINUTE

## 2024-05-17 PROCEDURE — 2500000004 HC RX 250 GENERAL PHARMACY W/ HCPCS (ALT 636 FOR OP/ED)

## 2024-05-17 PROCEDURE — 88185 FLOWCYTOMETRY/TC ADD-ON: CPT | Mod: TC | Performed by: STUDENT IN AN ORGANIZED HEALTH CARE EDUCATION/TRAINING PROGRAM

## 2024-05-17 PROCEDURE — A31652 PR BRNCHSC EBUS GUIDED SAMPL 1/2 NODE STATION/STRUX: Performed by: ANESTHESIOLOGY

## 2024-05-17 PROCEDURE — 88104 CYTOPATH FL NONGYN SMEARS: CPT | Performed by: STUDENT IN AN ORGANIZED HEALTH CARE EDUCATION/TRAINING PROGRAM

## 2024-05-17 PROCEDURE — 31652 BRONCH EBUS SAMPLNG 1/2 NODE: CPT | Performed by: STUDENT IN AN ORGANIZED HEALTH CARE EDUCATION/TRAINING PROGRAM

## 2024-05-17 PROCEDURE — 89051 BODY FLUID CELL COUNT: CPT | Performed by: STUDENT IN AN ORGANIZED HEALTH CARE EDUCATION/TRAINING PROGRAM

## 2024-05-17 PROCEDURE — 88112 CYTOPATH CELL ENHANCE TECH: CPT | Performed by: PATHOLOGY

## 2024-05-17 PROCEDURE — 88305 TISSUE EXAM BY PATHOLOGIST: CPT | Mod: TC,SUR | Performed by: STUDENT IN AN ORGANIZED HEALTH CARE EDUCATION/TRAINING PROGRAM

## 2024-05-17 PROCEDURE — 7100000002 HC RECOVERY ROOM TIME - EACH INCREMENTAL 1 MINUTE

## 2024-05-17 PROCEDURE — 7100000001 HC RECOVERY ROOM TIME - INITIAL BASE CHARGE

## 2024-05-17 PROCEDURE — 2500000005 HC RX 250 GENERAL PHARMACY W/O HCPCS

## 2024-05-17 RX ORDER — ONDANSETRON HYDROCHLORIDE 2 MG/ML
INJECTION, SOLUTION INTRAVENOUS AS NEEDED
Status: DISCONTINUED | OUTPATIENT
Start: 2024-05-17 | End: 2024-05-17

## 2024-05-17 RX ORDER — SODIUM CHLORIDE, SODIUM LACTATE, POTASSIUM CHLORIDE, CALCIUM CHLORIDE 600; 310; 30; 20 MG/100ML; MG/100ML; MG/100ML; MG/100ML
100 INJECTION, SOLUTION INTRAVENOUS CONTINUOUS
Status: DISCONTINUED | OUTPATIENT
Start: 2024-05-17 | End: 2024-05-18 | Stop reason: HOSPADM

## 2024-05-17 RX ORDER — LIDOCAINE HYDROCHLORIDE 20 MG/ML
INJECTION, SOLUTION INFILTRATION; PERINEURAL AS NEEDED
Status: DISCONTINUED | OUTPATIENT
Start: 2024-05-17 | End: 2024-05-17

## 2024-05-17 RX ORDER — PHENYLEPHRINE HCL IN 0.9% NACL 0.4MG/10ML
SYRINGE (ML) INTRAVENOUS AS NEEDED
Status: DISCONTINUED | OUTPATIENT
Start: 2024-05-17 | End: 2024-05-17

## 2024-05-17 RX ORDER — PROPOFOL 10 MG/ML
INJECTION, EMULSION INTRAVENOUS AS NEEDED
Status: DISCONTINUED | OUTPATIENT
Start: 2024-05-17 | End: 2024-05-17

## 2024-05-17 RX ORDER — NORETHINDRONE AND ETHINYL ESTRADIOL 0.5-0.035
KIT ORAL AS NEEDED
Status: DISCONTINUED | OUTPATIENT
Start: 2024-05-17 | End: 2024-05-17

## 2024-05-17 RX ORDER — ROCURONIUM BROMIDE 10 MG/ML
INJECTION, SOLUTION INTRAVENOUS AS NEEDED
Status: DISCONTINUED | OUTPATIENT
Start: 2024-05-17 | End: 2024-05-17

## 2024-05-17 RX ORDER — LABETALOL HYDROCHLORIDE 5 MG/ML
5 INJECTION, SOLUTION INTRAVENOUS ONCE AS NEEDED
Status: DISCONTINUED | OUTPATIENT
Start: 2024-05-17 | End: 2024-05-18 | Stop reason: HOSPADM

## 2024-05-17 RX ORDER — PHENYLEPHRINE 10 MG/250 ML(40 MCG/ML)IN 0.9 % SOD.CHLORIDE INTRAVENOUS
CONTINUOUS PRN
Status: DISCONTINUED | OUTPATIENT
Start: 2024-05-17 | End: 2024-05-17

## 2024-05-17 RX ORDER — LIDOCAINE HYDROCHLORIDE 10 MG/ML
0.1 INJECTION INFILTRATION; PERINEURAL ONCE
Status: DISCONTINUED | OUTPATIENT
Start: 2024-05-17 | End: 2024-05-18 | Stop reason: HOSPADM

## 2024-05-17 RX ORDER — ALBUTEROL SULFATE 0.83 MG/ML
2.5 SOLUTION RESPIRATORY (INHALATION) ONCE AS NEEDED
Status: DISCONTINUED | OUTPATIENT
Start: 2024-05-17 | End: 2024-05-18 | Stop reason: HOSPADM

## 2024-05-17 RX ORDER — ACETAMINOPHEN 325 MG/1
650 TABLET ORAL EVERY 4 HOURS PRN
Status: DISCONTINUED | OUTPATIENT
Start: 2024-05-17 | End: 2024-05-18 | Stop reason: HOSPADM

## 2024-05-17 RX ORDER — PROPOFOL 10 MG/ML
INJECTION, EMULSION INTRAVENOUS CONTINUOUS PRN
Status: DISCONTINUED | OUTPATIENT
Start: 2024-05-17 | End: 2024-05-17

## 2024-05-17 RX ORDER — FENTANYL CITRATE 50 UG/ML
INJECTION, SOLUTION INTRAMUSCULAR; INTRAVENOUS AS NEEDED
Status: DISCONTINUED | OUTPATIENT
Start: 2024-05-17 | End: 2024-05-17

## 2024-05-17 RX ORDER — ONDANSETRON HYDROCHLORIDE 2 MG/ML
4 INJECTION, SOLUTION INTRAVENOUS ONCE AS NEEDED
Status: DISCONTINUED | OUTPATIENT
Start: 2024-05-17 | End: 2024-05-18 | Stop reason: HOSPADM

## 2024-05-17 RX ADMIN — Medication 120 MCG: at 11:27

## 2024-05-17 RX ADMIN — EPHEDRINE SULFATE 10 MG: 50 INJECTION, SOLUTION INTRAVENOUS at 11:39

## 2024-05-17 RX ADMIN — Medication 160 MCG: at 11:37

## 2024-05-17 RX ADMIN — Medication 120 MCG: at 11:30

## 2024-05-17 RX ADMIN — ONDANSETRON 4 MG: 2 INJECTION, SOLUTION INTRAMUSCULAR; INTRAVENOUS at 12:22

## 2024-05-17 RX ADMIN — ROCURONIUM BROMIDE 40 MG: 10 INJECTION INTRAVENOUS at 11:16

## 2024-05-17 RX ADMIN — SODIUM CHLORIDE, SODIUM LACTATE, POTASSIUM CHLORIDE, AND CALCIUM CHLORIDE: 600; 310; 30; 20 INJECTION, SOLUTION INTRAVENOUS at 11:08

## 2024-05-17 RX ADMIN — LIDOCAINE HYDROCHLORIDE 50 MG: 20 INJECTION, SOLUTION INFILTRATION; PERINEURAL at 11:16

## 2024-05-17 RX ADMIN — SUGAMMADEX 100 MG: 100 INJECTION, SOLUTION INTRAVENOUS at 12:30

## 2024-05-17 RX ADMIN — Medication 120 MCG: at 11:39

## 2024-05-17 RX ADMIN — FENTANYL CITRATE 50 MCG: 50 INJECTION, SOLUTION INTRAMUSCULAR; INTRAVENOUS at 11:16

## 2024-05-17 RX ADMIN — EPHEDRINE SULFATE 10 MG: 50 INJECTION, SOLUTION INTRAVENOUS at 11:35

## 2024-05-17 RX ADMIN — ROCURONIUM BROMIDE 10 MG: 10 INJECTION INTRAVENOUS at 11:55

## 2024-05-17 RX ADMIN — Medication 120 MCG: at 12:28

## 2024-05-17 RX ADMIN — PROPOFOL 100 MCG/KG/MIN: 10 INJECTION, EMULSION INTRAVENOUS at 11:18

## 2024-05-17 RX ADMIN — EPHEDRINE SULFATE 10 MG: 50 INJECTION, SOLUTION INTRAVENOUS at 11:47

## 2024-05-17 RX ADMIN — PROPOFOL 200 MG: 10 INJECTION, EMULSION INTRAVENOUS at 11:16

## 2024-05-17 RX ADMIN — SUGAMMADEX 300 MG: 100 INJECTION, SOLUTION INTRAVENOUS at 12:25

## 2024-05-17 RX ADMIN — Medication 160 MCG: at 11:33

## 2024-05-17 RX ADMIN — Medication 120 MCG: at 11:50

## 2024-05-17 RX ADMIN — DEXAMETHASONE SODIUM PHOSPHATE 10 MG: 4 INJECTION INTRA-ARTICULAR; INTRALESIONAL; INTRAMUSCULAR; INTRAVENOUS; SOFT TISSUE at 11:25

## 2024-05-17 RX ADMIN — Medication 0.5 MCG/KG/MIN: at 11:52

## 2024-05-17 RX ADMIN — EPHEDRINE SULFATE 10 MG: 50 INJECTION, SOLUTION INTRAVENOUS at 12:28

## 2024-05-17 ASSESSMENT — PAIN SCALES - GENERAL
PAINLEVEL_OUTOF10: 0 - NO PAIN
PAINLEVEL_OUTOF10: 0 - NO PAIN
PAIN_LEVEL: 0
PAINLEVEL_OUTOF10: 0 - NO PAIN

## 2024-05-17 ASSESSMENT — PAIN - FUNCTIONAL ASSESSMENT
PAIN_FUNCTIONAL_ASSESSMENT: WONG-BAKER FACES
PAIN_FUNCTIONAL_ASSESSMENT: 0-10

## 2024-05-17 NOTE — ANESTHESIA PREPROCEDURE EVALUATION
Patient: Jacquelyn Buenrostro    Procedure Information       Date/Time: 05/17/24 1030    Scheduled providers: Dimas Kumar MD; Norah Sanches RN; Eze Love MD    Procedure: BRONCHOSCOPY    [facility]: [facility]            Relevant Problems   Anesthesia (within normal limits)      Cardiac   (+) HLD (hyperlipidemia)   (+) HTN (hypertension), benign   (+) Pacemaker      Pulmonary (within normal limits)      /Renal   (+) Chronic renal insufficiency      Liver (within normal limits)      Endocrine   (+) Class 2 obesity with body mass index (BMI) of 38.0 to 38.9 in adult      Hematology   (+) Anemia     Past Surgical History:   Procedure Laterality Date   • CARDIAC CATHETERIZATION     • CATARACT EXTRACTION W/  INTRAOCULAR LENS IMPLANT Bilateral 10/11/2023    Dr Amaro   • INSERT / REPLACE / REMOVE PACEMAKER  09/07/2022     No anesthesia complications.  Pt is NPO after midnight.    Clinical information reviewed:   Tobacco  Allergies  Meds   Med Hx  Surg Hx   Fam Hx  Soc Hx        NPO Detail:  NPO/Void Status  Date of Last Liquid: 05/17/24  Time of Last Liquid: 0700 (sips with meds)  Date of Last Solid: 05/16/24         Physical Exam    Airway  Mallampati: III  TM distance: >3 FB  Neck ROM: full     Cardiovascular - normal exam  Rhythm: regular  Rate: normal     Dental - normal exam     Pulmonary - normal exam     Abdominal        Anesthesia Plan    History of general anesthesia?: yes  History of complications of general anesthesia?: no    ASA 3     general     intravenous induction   Postoperative administration of opioids is intended.  Anesthetic plan and risks discussed with patient.

## 2024-05-17 NOTE — ANESTHESIA PROCEDURE NOTES
Airway  Date/Time: 5/17/2024 11:18 AM  Urgency: elective    Airway not difficult    Staffing  Performed: CRNA   Authorized by: Eze Love MD    Performed by: SHERIN Steele-COOPER  Patient location during procedure: OR    Indications and Patient Condition  Indications for airway management: anesthesia  Spontaneous Ventilation: absent  Sedation level: deep  Preoxygenated: yes  Patient position: sniffing  Mask difficulty assessment: 1 - vent by mask  Planned trial extubation    Final Airway Details  Final airway type: endotracheal airway      Successful airway: ETT  Cuffed: yes   Successful intubation technique: direct laryngoscopy  Facilitating devices/methods: intubating stylet and anterior pressure/BURP  Blade: Kunal  Blade size: #3  ETT size (mm): 8.0  Cormack-Lehane Classification: grade IIa - partial view of glottis  Placement verified by: chest auscultation and capnometry   Measured from: teeth  ETT to teeth (cm): 21  Number of attempts at approach: 1    Additional Comments  Lips and teeth in preanesthetic condition

## 2024-05-17 NOTE — LETTER
Dear, Jacquelyn Buenrostro      4/17/2024                                               INFORMATION FOR YOUR PROCEDURE     INSTRUCTIONS MUST BE FOLLOWED OR YOU RUN THE RISK OF YOUR CASE BEING CANCELED     Information is attached to this e-mail for your upcoming procedure. (Look for the paperclip in your email to access this information)  Lab requisitions are also included as well as procedural instructions.    You are scheduled for your Bronchoscopy on  5/17/24,    with Dr. Dimas Kumar    Arrival is at  9:30  AM,  for Check In prior to your actual procedure time. This allows us to prepare you for your procedure.  Location:   [place] [address]   Bronchoscopy / Endoscopy Suite   [facility] Room 1300.     Located on the 1st Floor close to the Main entrance of the hospital.  Enter through the front doors, turn left and we are the 1st hallway on the left hand side.(Room 1300 [facility]).  If you park in the visitor's garage you will come in on the second floor and will need to make your way down to the 1st level.     Patient information is right there if assistance is needed.    NPO (No food or drink) after midnight the night before your procedure. This includes coffee, water, and soda, hard candy, gum or mints.  If taking your morning medications, a small sip of water is allowed to get the medication down.    For your safety, you must have a responsible, adult  accompany you to your procedure.  You will not be permitted to drive yourself home if you have received any type of anesthesia or sedation.    Automated calls about your upcoming scheduled appointments can be quite confusing for patients.    The times may vary depending on what you have scheduled for procedure day. Follow the time/s I have given you  so there is no confusion.    Blood work will need to be done prior to your procedure, preferably at a  Facility.  There  are no restrictions for testing. I have attached a requisition for you. Have your blood work when you come in for Pre Admission Testing    PAT ( Pre Admission Testing) will call you to schedule your appointment.  This must be done before 5/17/24.    Please reach out to me with any questions you may have Sushma @ [phone] or   [place] @ [phone]    Have a nice day!    Sushma Dennis     (Bronchoscopy)  Interventional Pulmonology    MD Kelton Parham MD Sameer Avasarala, MD Catalina Teba, MD Andrew Dunatchik, MD        [facility]  Pulmonary, Critical Care and Sleep Medicine  [address] [place] [address]  P -[phone]   [phone]  [email or web address]

## 2024-05-17 NOTE — ANESTHESIA POSTPROCEDURE EVALUATION
Patient: Jacquelyn Buenrostro    Procedure Summary       Date: 05/17/24 Room / Location: [facility]    Anesthesia Start: 1108 Anesthesia Stop: 1251    Procedure: BRONCHOSCOPY Diagnosis: Granulomatous lung disease (Multi)    Scheduled Providers: Dimas Kumar MD; Norah Sanches RN; Eze Love MD Responsible Provider: Eze Love MD    Anesthesia Type: general ASA Status: 3            Anesthesia Type: general    Vitals Value Taken Time   BP 86/61 05/17/24 1235   Temp 36.4 °C (97.5 °F) 05/17/24 1235   Pulse 63 05/17/24 1235   Resp 16 05/17/24 1235   SpO2 98 % 05/17/24 1235       Anesthesia Post Evaluation    Patient location during evaluation: PACU  Patient participation: complete - patient participated  Level of consciousness: awake  Pain score: 0  Pain management: adequate  Airway patency: patent  Cardiovascular status: acceptable  Respiratory status: acceptable  Hydration status: acceptable  Postoperative Nausea and Vomiting: none    There were no known notable events for this encounter.

## 2024-05-19 LAB
BACTERIA SPEC RESP CULT: NORMAL
GRAM STN SPEC: NORMAL
GRAM STN SPEC: NORMAL

## 2024-05-20 LAB
CD3+CD4+ CELLS NFR BLD: 60 %
CD3+CD4+ CELLS/CD3+CD8+ CLL BLD: 2.22 %
CD3+CD8+ CELLS NFR BLD: 27 %

## 2024-05-21 LAB
LABORATORY COMMENT REPORT: NORMAL
LABORATORY COMMENT REPORT: NORMAL
PATH REPORT.FINAL DX SPEC: NORMAL
PATH REPORT.GROSS SPEC: NORMAL
PATH REPORT.INTRAOP OBS SPEC DOC: NORMAL
PATH REPORT.RELEVANT HX SPEC: NORMAL
PATH REPORT.TOTAL CANCER: NORMAL

## 2024-05-21 ASSESSMENT — PAIN SCALES - GENERAL: PAINLEVEL_OUTOF10: 0 - NO PAIN

## 2024-05-22 LAB — PATH REVIEW-CELL CT,FLUID: NORMAL

## 2024-05-24 ENCOUNTER — APPOINTMENT (OUTPATIENT)
Dept: PULMONOLOGY | Facility: CLINIC | Age: 69
End: 2024-05-24
Payer: COMMERCIAL

## 2024-05-24 LAB
LABORATORY COMMENT REPORT: NORMAL
PATH REPORT.ADDENDUM SPEC: NORMAL
PATH REPORT.FINAL DX SPEC: NORMAL
PATH REPORT.GROSS SPEC: NORMAL
PATH REPORT.TOTAL CANCER: NORMAL

## 2024-05-28 NOTE — RESULT ENCOUNTER NOTE
Results showing granulomas in lymph nodes and from tbbx consistent with sarcoidosis. Recommend follow up with pulmonologist and scheduled with Dr. Mendoza.

## 2024-06-03 LAB
FUNGUS SPEC CULT: NORMAL
FUNGUS SPEC FUNGUS STN: NORMAL

## 2024-06-06 LAB
ACID FAST STN SPEC: NORMAL
MYCOBACTERIUM SPEC CULT: NORMAL

## 2024-06-12 LAB
ACID FAST STN SPEC: NORMAL
MYCOBACTERIUM SPEC CULT: NORMAL

## 2024-06-19 LAB
ACID FAST STN SPEC: NORMAL
MYCOBACTERIUM SPEC CULT: NORMAL

## 2024-06-25 ENCOUNTER — APPOINTMENT (OUTPATIENT)
Dept: CARDIOLOGY | Facility: CLINIC | Age: 69
End: 2024-06-25
Payer: COMMERCIAL

## 2024-06-25 VITALS
BODY MASS INDEX: 41.48 KG/M2 | HEART RATE: 88 BPM | OXYGEN SATURATION: 96 % | SYSTOLIC BLOOD PRESSURE: 118 MMHG | DIASTOLIC BLOOD PRESSURE: 77 MMHG | WEIGHT: 249 LBS | HEIGHT: 65 IN

## 2024-06-25 DIAGNOSIS — D86.85 CARDIAC SARCOIDOSIS (HHS-HCC): ICD-10-CM

## 2024-06-25 DIAGNOSIS — I10 ESSENTIAL HYPERTENSION: ICD-10-CM

## 2024-06-25 DIAGNOSIS — I50.30 DIASTOLIC HEART FAILURE, STAGE C (MULTI): Primary | ICD-10-CM

## 2024-06-25 PROCEDURE — 1160F RVW MEDS BY RX/DR IN RCRD: CPT | Performed by: INTERNAL MEDICINE

## 2024-06-25 PROCEDURE — 1159F MED LIST DOCD IN RCRD: CPT | Performed by: INTERNAL MEDICINE

## 2024-06-25 PROCEDURE — 1036F TOBACCO NON-USER: CPT | Performed by: INTERNAL MEDICINE

## 2024-06-25 PROCEDURE — 1157F ADVNC CARE PLAN IN RCRD: CPT | Performed by: INTERNAL MEDICINE

## 2024-06-25 PROCEDURE — 3078F DIAST BP <80 MM HG: CPT | Performed by: INTERNAL MEDICINE

## 2024-06-25 PROCEDURE — 99214 OFFICE O/P EST MOD 30 MIN: CPT | Performed by: INTERNAL MEDICINE

## 2024-06-25 PROCEDURE — 3074F SYST BP LT 130 MM HG: CPT | Performed by: INTERNAL MEDICINE

## 2024-06-25 NOTE — PROGRESS NOTES
[facility] Advanced Heart Failure Clinic  Primary Care Physician: Kaye Grey MD  Referring Provider/Cardiologist: Vijay     Date of Visit: 2024  3:40 PM EDT  Location of visit: [address]     HPI:   Ms. Buenrostro is a 68F with a PMHx sig for HTN, stage C diastolic HF/HFpEF, and high grade AV block s/p ppm (22) who returns to the Advanced Heart Failure clinic for ongoing evaluation and management.      Interval Hx:   Currently denies chest pain, palpitations, shortness of breath, dyspnea on exertion, orthopnea, PND. No edema noted in BLE. Patient denies headaches, dizziness.     Patient had a fall in April after she tripped on the sidewalk and went to the ED.     Her case was discussed at the HCM board and felt to be more likely secondary to sarcoidosis. She underwent bronchoscopy as CT imaging noted calcified hilar lymph nodes. She has follow up next month with Pulmonary.     Hospitalizations: -9/3- [facility], complete AV block   Medication adherence: Reports adherence       PM/SHx:   HTN, stage C diastolic HF/HFpEF, high grade AV block s/p ppm (22)     SocHx:   Denies smoking, ETOH, illicits  lives alone in [place]     FamHx:   Mother  of CHF at age 92      Current Outpatient Medications   Medication Sig Dispense Refill    amLODIPine (Norvasc) 5 mg tablet Take 1 tablet (5 mg) by mouth once daily.      carvedilol (Coreg) 25 mg tablet Take 2 tablets (50 mg) by mouth 2 times a day.      furosemide (Lasix) 20 mg tablet Take 1 tablet (20 mg) by mouth once daily.      lisinopril 10 mg tablet Take 1 tablet (10 mg) by mouth once daily.      melatonin 10 mg tablet Take 1 tablet (10 mg) by mouth once daily.      multivitamin-min-FA-ginkgo (One Daily Women 50 Plus) 400-120 mcg-mg tablet Take 1 tablet by mouth once daily.      simvastatin (Zocor) 20 mg tablet Take 1 tablet (20 mg) by mouth once daily.      vit C/E/zinc/lutein/zeaxanthin (OCUVITE EYE HEALTH ORAL) Take 1  "tablet by mouth once daily.       No current facility-administered medications for this visit.       Allergies   Allergen Reactions    Aspirin Unknown     Nose bleed   Per pt. Request          Visit Vitals  /77 (BP Location: Left arm, Patient Position: Sitting)   Pulse 88   Ht 1.651 m (5' 5\")   Wt 113 kg (249 lb)   SpO2 96%   BMI 41.44 kg/m²   OB Status Postmenopausal   Smoking Status Never   BSA 2.28 m²        Physical Exam:  On exam Ms. Buenrostro appears her stated age, is alert and oriented x3, and in no acute distress. Her sclera are anicteric and her oropharynx has moist mucous membranes. Her neck is supple and without thyromegaly. The JVP is ~5 cm of water above the right atrium. Her cardiac exam has regular rhythm, normal S1, S2. No S3/4. There are no murmurs with provocation. Her lungs are clear to auscultation bilaterally and there is no dullness to percussion. Her abdomen is soft, nontender with normoactive bowel sounds. There is no HJR. The extremities are warm and with trace edema. There are vericose veins. The skin is dry. There is no rash present. The distal pulses are 2+ in all four extremities. Her mood and affect are appropriate for todays encounter.       Cardiac Labs/Diagnostics:    Lab Results   Component Value Date    CREATININE 1.55 (H) 05/02/2024    BUN 30 (H) 05/02/2024     05/02/2024    K 4.8 05/02/2024     05/02/2024    CO2 26 05/02/2024        Recent Labs     02/08/24  0721 12/13/22  0807 09/27/22  0805 08/30/22  1455   CHOL 138 151 212* 178   LDLF  --  70 116* 84   LDLCALC 63  --   --   --    HDL 61.4 64.3 72.0 73.0   TRIG 70 82 120 107       Recent Labs     08/30/22  1454   *       PET (1/18/24):  2. Assuming appropriate fasting there is increased metabolic activity throughout the left ventricular wall except within the apical inferior and inferoseptal wall, compatible with an inflammatory process such as myocarditis or sarcoidosis.  3. Normal perfusion of the left " ventricular myocardium, without evidence of fibrotic changes.  4. Markedly dilated left ventricular chamber with normal left ventricular motion and a left ventricular ejection fraction of 50%.  5. No evidence of hypermetabolic systemic sarcoidosis. However there is multiple mediastinal and bilateral hilar calcified lymph nodes without FDG uptake indicating chronic granulomatous disease.  6. Hypermetabolic activity seen at distal esophagus/gastroesophageal junction, correlated with minimal wall thickening. Further correlation with upper endoscopy is of value    Echo (6/20/23):  1. Left ventricular systolic function is normal with a 65-70% estimated ejection fraction.  2. Spectral Doppler shows an impaired relaxation pattern of left ventricular diastolic filling.  3. Mild mitral valve regurgitation.  4. RVSP within normal limits.  5. There is no evidence of a patent foramen ovale.    ECG (4/4/23):  A-V paced (HR 70)    cMRI (2/14/23):  1. Dilated left ventricle with moderately depressed systolic function. Ejection fraction 43%.  2. Delayed enhancement imaging reveals diffuse hyperenhancement of the basal anterior septum. Focal confluent hyperenhancement of the mid anterior ventricular junction 1.3 cm. Findings are consistent with hypertrophic cardiomyopathy. Also consider cardiac sarcoid.    Cardiac cath (1/10/23):  Mild CAD    Echo (8/31/22):  1. Left ventricular systolic function is normal.  2. Spectral Doppler shows an impaired relaxation pattern of left ventricular diastolic filling.  3. There is moderate to severe concentric left ventricular hypertrophy.  4. Aortic valve appears abnormal.  5. Moderate aortic valve stenosis.  6. There is moderate aortic valve cusp calcification.  7. Moderate LVH with normal EF.  8. Moderate aortic stenosis PG 31/17 mmHg.  9. Diastolic dysfunction with moderate tricuspid regurgitation and dilated atria.      Impression/Plan:  Ms. Buenrostro is a 68F with a PMHx sig for HTN, stage C  diastolic HF/HFpEF, and high grade AV block s/p ppm (9/1/22) who returns to the Advanced Heart Failure clinic for ongoing evaluation and management.      1) Cardiomyopathy  Concern for HCM after presenting to the hospital with 2 months of progressive symptoms. Noted to be in high grade AVB at that time which needed placement of a ppm. Subsequently underwent a cMRI which showed diffuse hyperenhancement of the basal septum, asymmetric septal LVH (septum 1.9 cm), preserved CO/CI (6.8/3.2), and scar burden of 6% concerning for either HCM or cardiac sarcoidosis. There is no family history of SCD and no personal history of syncope. Cath showed no sig CAD. Case discussed at HCM Board; recommended to proceed with genetic testing and obtain PET given cMRI findings. PET was interpreted as sarcoidosis. She subsequently underwent a bronchoscopy with biopsy as her CT noted calcified hilar lymph nodes.   -c/w carvedilol 50 mg bid  -f/u with pulmonary re: sarcoidosis    2) Stage C chronic diastolic HF/HFpEF  Most recent  (8/30/22). Cardiomyopathy eval as above.   -c/w lisinopril 10 mg bid, furosemide 20 mg daily    3) HTN  BP improved with the addition of CCB.   -c/w amlodipine 5 mg daily      F/U: 6 months at /[facility]    Dr. Linares,  Thank you for referring Ms. Buenrostro to the  Advanced Heart Failure Clinic. Please let me know if you have any questions.    ____________________________________________________________  Charly Geiger,   Section of Advanced Heart Failure and Cardiac Transplantation  Division of Cardiovascular Medicine  [facility]  [facility]

## 2024-06-25 NOTE — PATIENT INSTRUCTIONS
It was a pleasure seeing you today. Please contact myself or my team with any questions.     To reach Dr. Geiger' office please call [phone] (Ana).   Fax: [phone]   To schedule an appointment call [phone]     If you have any questions or need cardiac medication refills, please call the Heart Failure office at [phone], option 6. You may also contact the  Heart Failure Nursing team via email at [email or web address] (Please include your name and date of birth).        1) Continue your current medications  2) Follow up with the lung specialist next month  3) Follow up in in 6 months at /[facility]

## 2024-06-26 ENCOUNTER — HOSPITAL ENCOUNTER (OUTPATIENT)
Dept: CARDIOLOGY | Facility: HOSPITAL | Age: 69
Discharge: HOME | End: 2024-06-26
Payer: COMMERCIAL

## 2024-06-26 DIAGNOSIS — Z95.0 PACEMAKER: ICD-10-CM

## 2024-06-26 DIAGNOSIS — I44.2 ATRIOVENTRICULAR BLOCK, COMPLETE (MULTI): ICD-10-CM

## 2024-06-26 LAB
ACID FAST STN SPEC: NORMAL
MYCOBACTERIUM SPEC CULT: NORMAL

## 2024-06-26 PROCEDURE — 93296 REM INTERROG EVL PM/IDS: CPT

## 2024-07-03 LAB
ACID FAST STN SPEC: NORMAL
MYCOBACTERIUM SPEC CULT: NORMAL

## 2024-07-09 LAB
ACID FAST STN SPEC: NORMAL
MYCOBACTERIUM SPEC CULT: NORMAL

## 2024-07-11 ENCOUNTER — LAB (OUTPATIENT)
Dept: LAB | Facility: LAB | Age: 69
End: 2024-07-11
Payer: COMMERCIAL

## 2024-07-11 DIAGNOSIS — R79.89 ABNORMAL TSH: ICD-10-CM

## 2024-07-11 DIAGNOSIS — R73.03 PRE-DIABETES: ICD-10-CM

## 2024-07-11 DIAGNOSIS — D64.9 ANEMIA, UNSPECIFIED TYPE: ICD-10-CM

## 2024-07-11 LAB
BASOPHILS # BLD AUTO: 0.04 X10*3/UL (ref 0–0.1)
BASOPHILS NFR BLD AUTO: 0.5 %
EOSINOPHIL # BLD AUTO: 0.23 X10*3/UL (ref 0–0.7)
EOSINOPHIL NFR BLD AUTO: 2.9 %
ERYTHROCYTE [DISTWIDTH] IN BLOOD BY AUTOMATED COUNT: 14.6 % (ref 11.5–14.5)
EST. AVERAGE GLUCOSE BLD GHB EST-MCNC: 117 MG/DL
FERRITIN SERPL-MCNC: 98 NG/ML (ref 8–150)
FOLATE SERPL-MCNC: >24 NG/ML
HBA1C MFR BLD: 5.7 %
HCT VFR BLD AUTO: 34 % (ref 36–46)
HGB BLD-MCNC: 10.8 G/DL (ref 12–16)
IMM GRANULOCYTES # BLD AUTO: 0.02 X10*3/UL (ref 0–0.7)
IMM GRANULOCYTES NFR BLD AUTO: 0.2 % (ref 0–0.9)
IRON SATN MFR SERPL: 17 % (ref 25–45)
IRON SERPL-MCNC: 56 UG/DL (ref 35–150)
LYMPHOCYTES # BLD AUTO: 2.28 X10*3/UL (ref 1.2–4.8)
LYMPHOCYTES NFR BLD AUTO: 28.3 %
MCH RBC QN AUTO: 29.3 PG (ref 26–34)
MCHC RBC AUTO-ENTMCNC: 31.8 G/DL (ref 32–36)
MCV RBC AUTO: 92 FL (ref 80–100)
MONOCYTES # BLD AUTO: 0.63 X10*3/UL (ref 0.1–1)
MONOCYTES NFR BLD AUTO: 7.8 %
NEUTROPHILS # BLD AUTO: 4.85 X10*3/UL (ref 1.2–7.7)
NEUTROPHILS NFR BLD AUTO: 60.3 %
NRBC BLD-RTO: 0 /100 WBCS (ref 0–0)
PLATELET # BLD AUTO: 201 X10*3/UL (ref 150–450)
RBC # BLD AUTO: 3.69 X10*6/UL (ref 4–5.2)
T4 FREE SERPL-MCNC: 0.93 NG/DL (ref 0.78–1.48)
TIBC SERPL-MCNC: 322 UG/DL (ref 240–445)
TSH SERPL-ACNC: 4.5 MIU/L (ref 0.44–3.98)
UIBC SERPL-MCNC: 266 UG/DL (ref 110–370)
VIT B12 SERPL-MCNC: 633 PG/ML (ref 211–911)
WBC # BLD AUTO: 8.1 X10*3/UL (ref 4.4–11.3)

## 2024-07-11 PROCEDURE — 82607 VITAMIN B-12: CPT

## 2024-07-11 PROCEDURE — 36415 COLL VENOUS BLD VENIPUNCTURE: CPT

## 2024-07-11 PROCEDURE — 83550 IRON BINDING TEST: CPT

## 2024-07-11 PROCEDURE — 83036 HEMOGLOBIN GLYCOSYLATED A1C: CPT

## 2024-07-11 PROCEDURE — 84439 ASSAY OF FREE THYROXINE: CPT

## 2024-07-11 PROCEDURE — 84443 ASSAY THYROID STIM HORMONE: CPT

## 2024-07-11 PROCEDURE — 82746 ASSAY OF FOLIC ACID SERUM: CPT

## 2024-07-11 PROCEDURE — 85025 COMPLETE CBC W/AUTO DIFF WBC: CPT

## 2024-07-11 PROCEDURE — 82728 ASSAY OF FERRITIN: CPT

## 2024-07-11 PROCEDURE — 83540 ASSAY OF IRON: CPT

## 2024-07-12 ENCOUNTER — TELEPHONE (OUTPATIENT)
Dept: PRIMARY CARE | Facility: CLINIC | Age: 69
End: 2024-07-12
Payer: COMMERCIAL

## 2024-07-12 NOTE — TELEPHONE ENCOUNTER
----- Message from Kaye Grey sent at 7/12/2024  8:32 AM EDT -----  Please, inform the pt that the recent BW showed minimally decreased, but stable thyroid gland function. Pre diabetes state is stable. The HGB level is decreased and trending down. Please, FU in the office within the next 1-2 weeks

## 2024-07-12 NOTE — TELEPHONE ENCOUNTER
Spoke with patient to advise.  Patient verbalized understanding.  Transferred to  to reschedule appointment.

## 2024-07-16 ENCOUNTER — APPOINTMENT (OUTPATIENT)
Dept: CARDIOLOGY | Facility: CLINIC | Age: 69
End: 2024-07-16
Payer: COMMERCIAL

## 2024-07-18 ENCOUNTER — APPOINTMENT (OUTPATIENT)
Dept: PRIMARY CARE | Facility: CLINIC | Age: 69
End: 2024-07-18
Payer: COMMERCIAL

## 2024-07-19 ENCOUNTER — APPOINTMENT (OUTPATIENT)
Dept: PRIMARY CARE | Facility: CLINIC | Age: 69
End: 2024-07-19
Payer: COMMERCIAL

## 2024-07-19 VITALS
HEIGHT: 65 IN | BODY MASS INDEX: 41.79 KG/M2 | WEIGHT: 250.8 LBS | SYSTOLIC BLOOD PRESSURE: 108 MMHG | DIASTOLIC BLOOD PRESSURE: 69 MMHG | HEART RATE: 75 BPM

## 2024-07-19 DIAGNOSIS — E78.5 HYPERLIPIDEMIA, UNSPECIFIED HYPERLIPIDEMIA TYPE: ICD-10-CM

## 2024-07-19 DIAGNOSIS — I50.30 DIASTOLIC HEART FAILURE, STAGE C (MULTI): ICD-10-CM

## 2024-07-19 DIAGNOSIS — I89.0 LYMPHEDEMA: ICD-10-CM

## 2024-07-19 DIAGNOSIS — E66.01 OBESITY, MORBID, BMI 40.0-49.9 (MULTI): ICD-10-CM

## 2024-07-19 DIAGNOSIS — Z00.00 ROUTINE GENERAL MEDICAL EXAMINATION AT HEALTH CARE FACILITY: Primary | ICD-10-CM

## 2024-07-19 DIAGNOSIS — Z95.0 PACEMAKER: ICD-10-CM

## 2024-07-19 DIAGNOSIS — I10 HTN (HYPERTENSION), BENIGN: ICD-10-CM

## 2024-07-19 DIAGNOSIS — R79.89 ELEVATED TSH: ICD-10-CM

## 2024-07-19 DIAGNOSIS — N18.32 CKD STAGE G3B/A2, GFR 30-44 AND ALBUMIN CREATININE RATIO 30-299 MG/G (MULTI): ICD-10-CM

## 2024-07-19 PROBLEM — R06.00 DYSPNEA: Status: ACTIVE | Noted: 2024-07-19

## 2024-07-19 PROBLEM — D62 ACUTE POSTHEMORRHAGIC ANEMIA: Status: ACTIVE | Noted: 2022-09-03

## 2024-07-19 PROBLEM — I50.9 CONGESTIVE HEART FAILURE (MULTI): Status: ACTIVE | Noted: 2022-09-03

## 2024-07-19 PROBLEM — I51.7 LVH (LEFT VENTRICULAR HYPERTROPHY): Status: ACTIVE | Noted: 2022-09-01

## 2024-07-19 PROBLEM — I16.1 HYPERTENSIVE EMERGENCY: Status: ACTIVE | Noted: 2023-01-10

## 2024-07-19 PROBLEM — I51.89 DIASTOLIC DYSFUNCTION: Status: ACTIVE | Noted: 2022-09-01

## 2024-07-19 PROBLEM — R06.00 PAROXYSMAL NOCTURNAL DYSPNEA: Status: ACTIVE | Noted: 2024-07-19

## 2024-07-19 PROBLEM — R05.9 COUGH: Status: ACTIVE | Noted: 2024-07-19

## 2024-07-19 PROBLEM — J81.1 PULMONARY EDEMA (HHS-HCC): Status: ACTIVE | Noted: 2024-07-19

## 2024-07-19 PROBLEM — Z20.822 CONTACT WITH AND (SUSPECTED) EXPOSURE TO COVID-19: Status: ACTIVE | Noted: 2022-09-03

## 2024-07-19 RX ORDER — AMLODIPINE BESYLATE 5 MG/1
5 TABLET ORAL DAILY
Qty: 45 TABLET | Refills: 3 | Status: SHIPPED | OUTPATIENT
Start: 2024-07-19

## 2024-07-19 ASSESSMENT — ACTIVITIES OF DAILY LIVING (ADL)
DOING_HOUSEWORK: INDEPENDENT
BATHING: INDEPENDENT
GROCERY_SHOPPING: INDEPENDENT
DRESSING: INDEPENDENT
MANAGING_FINANCES: INDEPENDENT
TAKING_MEDICATION: INDEPENDENT

## 2024-07-19 ASSESSMENT — PATIENT HEALTH QUESTIONNAIRE - PHQ9
1. LITTLE INTEREST OR PLEASURE IN DOING THINGS: NOT AT ALL
2. FEELING DOWN, DEPRESSED OR HOPELESS: NOT AT ALL
SUM OF ALL RESPONSES TO PHQ9 QUESTIONS 1 AND 2: 0

## 2024-07-19 NOTE — PROGRESS NOTES
" Subjective   Reason for Visit: Jacquelyn Buenrostro is an 68 y.o. female here for a Medicare Wellness visit.          Reviewed all medications by prescribing practitioner or clinical pharmacist (such as prescriptions, OTCs, herbal therapies and supplements) and documented in the medical record.    HPIcame for welleness visit  Has living will  Has done cologuard she says  Mammogram ok  Labs reviewed and shared  Knee problem and weight problem  Breathing ok   Had ppm     Patient Care Team:  Kaye Grey MD as PCP - General (Internal Medicine)  Benito Castillo MD as Nephrologist (Nephrology)  Yazan Lopez MD as Vascular (Vascular Medicine)     Review of Systems  Swelling of feet some  No chest pain  Other systemic enquiry neg  Immonization reviewed  Chart consult pmh and meds reviewed    Objective   Vitals:  /69 (BP Location: Left arm, Patient Position: Sitting)   Pulse 75   Ht 1.651 m (5' 5\")   Wt 114 kg (250 lb 12.8 oz)   BMI 41.74 kg/m²       Physical Exam  Edema lower extremity  Jvd neg  Hs regular  Chest clear  Abd soft nontender  Neuro awake alert    Assessment/Plan   Problem List Items Addressed This Visit             ICD-10-CM    HLD (hyperlipidemia) E78.5    HTN (hypertension), benign I10    Pacemaker Z95.0    Diastolic heart failure, stage C (Multi) I50.30    Relevant Medications    amLODIPine (Norvasc) 5 mg tablet    Obesity, morbid, BMI 40.0-49.9 (Multi) E66.01    Lymphedema I89.0    CKD stage G3b/A2, GFR 30-44 and albumin creatinine ratio  mg/g (Multi) N18.32    Routine general medical examination at health care facility - Primary Z00.00    Relevant Medications    amLODIPine (Norvasc) 5 mg tablet    zoster vaccine-recombinant adjuvanted (Shingrix) 50 mcg/0.5 mL vaccine    Elevated TSH R79.89     Discussed all conditions and wellness  Follow up on thyroid advised  Follow specialist  Shingles vaccine sent to pharmacy         "

## 2024-07-24 NOTE — PROGRESS NOTES
Subjective   Patient ID: Jacquelyn Buenrostro is a 68 y.o. female who presents for Breathing Problem (Follow up on bronchoscopy /).  HPI  Ms. Buenrostro is a 68F PMH cardiac sarcoidosis, HTN, HFpEF, AV block s/p PPM, PVD who is referred to pulmonary for cardiac sarcoidosis by Dr. Grey.       #Abnormal imaging  #Sarcoidosis  #Cardiac sarcoidosis  Patient seen by HF team and suspect HCM 2/2 sarcoidosis  Had bronchoscopy with granulomas 5/2024. She was called by IP team about her results.   No current treatments have been initiated  We discussed the nature of sarcoidosis and the treatment indications and options.   She had on/off chest pain in the past.   She does have coughing but more in the past  No wheezing  She has been increasing her exercise. She doesn't have any SOB with walking since the PPM.        Family History:  No lung disease history  No autoimmune disease history    Social History:   Never smoker  Caregiver for seniors currently. Restaurant, delivered papers,     Review of Systems   Constitutional:  Negative for chills, fever and unexpected weight change.   HENT:  Negative for sore throat.    Respiratory:  Negative for cough, shortness of breath and wheezing.    Cardiovascular:  Positive for leg swelling. Negative for chest pain and palpitations.   Gastrointestinal:  Negative for abdominal pain.       Objective   Physical Exam  Vitals reviewed.   HENT:      Head: Normocephalic.      Nose: Nose normal.      Mouth/Throat:      Mouth: Mucous membranes are moist.   Eyes:      Extraocular Movements: Extraocular movements intact.   Cardiovascular:      Rate and Rhythm: Normal rate and regular rhythm.      Heart sounds: No murmur heard.  Pulmonary:      Effort: Pulmonary effort is normal.      Breath sounds: Wheezing present. No rhonchi.   Musculoskeletal:      Cervical back: Neck supple.      Right lower leg: Edema present.      Left lower leg: Edema present.      Comments: Non-pitting   Lymphadenopathy:       Cervical: No cervical adenopathy.   Skin:     General: Skin is warm and dry.   Neurological:      General: No focal deficit present.      Mental Status: She is alert and oriented to person, place, and time.         Pathology 5/17/24  A.  LUNG, RIGHT MIDDLE LOBE, TRANSBRONCHIAL BIOPSY:   -- Fragments of bronchial wall and lung parenchyma with rare non-necrotizing granulomas, chronic inflammation, and scattered eosinophils. See note.      Note: Deeper levels were reviewed. Rare non-necrotizing granulomas are seen. AFB and GMS will be performed and reported in an addendum.      B.  BRONCHUS INTERMEDIUS, ENDOBRONCHIAL BIOPSY:  -- Fragments of bronchial wall with mixed acute and chronic inflammation and scattered eosinophils. See note.     Note: Deeper levels were reviewed. There is no evidence of a mass lesion or granuloma in this limited biopsy specimen. Clinical correlation with regards to adequacy of sampling is recommended    Cytology 5/17/24  A. LUNG, RIGHT MIDDLE LOBE, BRONCHO-ALVEOLAR LAVAGE, CYTOLOGY:  --  No malignant cells or viral inclusions identified.  --  Acute inflammatory cells present.  --  Satisfactory for evaluation, pulmonary macrophages present.  --  GMS stain is negative for organisms: fungal and pneumocystis organisms.                  B. LYMPH NODE, 7 PULMONARY, FINE NEEDLE ASPIRATION,  CYTOLOGY AND CELL BLOCK:  --  No malignant cells identified.  --  Lymphoid sample.     C. LYMPH NODE, 4 R PULMONARY, FINE NEEDLE ASPIRATION, CYTOLOGY AND CELL BLOCK:  --  No malignant cells identified.  --  Non necrotizing granulomata, see note  --  Lymphoid sample.    CD4/CD8 2.22    CT chest 3/22/24  IMPRESSION:  1. Scattered areas of tree-in-bud nodularity predominantly visualized  within the right middle lobe and right upper lobe. Findings are  compatible with an infectious/inflammatory bronchiolitis.  2. Mosaic attenuation of the bilateral lungs likely reflects sequela  of small airway disease.  3. Calcified  mediastinal and hilar lymph nodes compatible with  sequela of prior granulomatous infection.  4. Unchanged small hiatal hernia.    Cardiac PET 1/19/24  IMPRESSION:  1.  2. Assuming appropriate fasting there is increased metabolic activity  throughout the left ventricular wall except within the apical  inferior and inferoseptal wall, compatible with an inflammatory  process such as myocarditis or sarcoidosis.  3. Normal perfusion of the left ventricular myocardium, without  evidence of fibrotic changes.  4. Markedly dilated left ventricular chamber with normal left  ventricular motion and a left ventricular ejection fraction of 50%.  5. No evidence of hypermetabolic systemic sarcoidosis. However there  is multiple mediastinal and bilateral hilar calcified lymph nodes  without FDG uptake indicating chronic granulomatous disease.  6. Hypermetabolic activity seen at distal esophagus/gastroesophageal  junction, correlated with minimal wall thickening. Further  correlation with upper endoscopy is of value.    PFT 3/7/24  FVC 2.43 83%, FEV 1.62 70%, ratio 0.66, no BD response  DLCO 15.98 81%    Assessment/Plan   Diagnoses and all orders for this visit:  Sarcoidosis  -     Comprehensive metabolic panel; Future  -     Sedimentation rate, automated; Future  -     C-reactive protein; Future  -     predniSONE (Deltasone) 10 mg tablet; Take 3 tablets (30 mg) by mouth once daily for 60 days, THEN 2 tablets (20 mg) once daily.  -     sulfamethoxazole-trimethoprim (Bactrim) 400-80 mg tablet; Take 1 tablet by mouth once daily.  -     albuterol 90 mcg/actuation inhaler; Inhale 2 puffs every 6 hours if needed for wheezing.    Ms. Buenrostro is a 68F PMH cardiac sarcoidosis, HTN, HFpEF, AV block s/p PPM, PVD who is referred to pulmonary for cardiac sarcoidosis by Dr. Grey.     #Cardiac sarcoidosis  #AV block s/p PPM  #Sarcoidosis    Today I reviewed her bronchoscopy results and imaging with the patient. It seems that there is a  consensus that she had cardiac sarcoidosis leading to high AV block. She does not describe current respiratory symptoms.   Her imaging shows calcified lymphadenopathy and nonspecific scarring.   We discussed starting prednisone for immunsuppression and following up with Dr. Aguila for cardiac sarcoidosis treatment.   Patient voiced concerns about DM2 and weight gain with chronic steroids. We discussed that immunsuppression needed and plan is to wean steroids as soon as possible.   Bactrim ordered for PJP prophylaxis.   Albuterol ordered for wheezing heard on exam  Labs ordered today: CMP, ESR, CRP. Recent CBC reviewed  Patient has an ophthalmologist and plans to discuss sarcoidosis exam at next visit. Follows with cardiology at this time.     RTC 4-6 weeks with Dr. Mingo Mendoza MD 07/26/24 10:02 AM

## 2024-07-26 ENCOUNTER — APPOINTMENT (OUTPATIENT)
Dept: PULMONOLOGY | Facility: CLINIC | Age: 69
End: 2024-07-26
Payer: COMMERCIAL

## 2024-07-26 ENCOUNTER — LAB (OUTPATIENT)
Dept: LAB | Facility: LAB | Age: 69
End: 2024-07-26
Payer: COMMERCIAL

## 2024-07-26 VITALS
BODY MASS INDEX: 41.82 KG/M2 | OXYGEN SATURATION: 95 % | DIASTOLIC BLOOD PRESSURE: 65 MMHG | HEIGHT: 65 IN | SYSTOLIC BLOOD PRESSURE: 103 MMHG | HEART RATE: 79 BPM | WEIGHT: 251 LBS | TEMPERATURE: 97.9 F

## 2024-07-26 DIAGNOSIS — D86.9 SARCOIDOSIS: ICD-10-CM

## 2024-07-26 DIAGNOSIS — D86.9 SARCOIDOSIS: Primary | ICD-10-CM

## 2024-07-26 LAB
ALBUMIN SERPL BCP-MCNC: 4 G/DL (ref 3.4–5)
ALP SERPL-CCNC: 59 U/L (ref 33–136)
ALT SERPL W P-5'-P-CCNC: 11 U/L (ref 7–45)
ANION GAP SERPL CALC-SCNC: 14 MMOL/L (ref 10–20)
AST SERPL W P-5'-P-CCNC: 14 U/L (ref 9–39)
BILIRUB SERPL-MCNC: 0.4 MG/DL (ref 0–1.2)
BUN SERPL-MCNC: 34 MG/DL (ref 6–23)
CALCIUM SERPL-MCNC: 9 MG/DL (ref 8.6–10.3)
CHLORIDE SERPL-SCNC: 105 MMOL/L (ref 98–107)
CO2 SERPL-SCNC: 23 MMOL/L (ref 21–32)
CREAT SERPL-MCNC: 1.2 MG/DL (ref 0.5–1.05)
CRP SERPL-MCNC: 3.33 MG/DL
EGFRCR SERPLBLD CKD-EPI 2021: 50 ML/MIN/1.73M*2
ERYTHROCYTE [SEDIMENTATION RATE] IN BLOOD BY WESTERGREN METHOD: 33 MM/H (ref 0–30)
GLUCOSE SERPL-MCNC: 86 MG/DL (ref 74–99)
POTASSIUM SERPL-SCNC: 5.1 MMOL/L (ref 3.5–5.3)
PROT SERPL-MCNC: 6.5 G/DL (ref 6.4–8.2)
SODIUM SERPL-SCNC: 137 MMOL/L (ref 136–145)

## 2024-07-26 PROCEDURE — 99215 OFFICE O/P EST HI 40 MIN: CPT | Performed by: INTERNAL MEDICINE

## 2024-07-26 PROCEDURE — 1159F MED LIST DOCD IN RCRD: CPT | Performed by: INTERNAL MEDICINE

## 2024-07-26 PROCEDURE — 1157F ADVNC CARE PLAN IN RCRD: CPT | Performed by: INTERNAL MEDICINE

## 2024-07-26 PROCEDURE — 1125F AMNT PAIN NOTED PAIN PRSNT: CPT | Performed by: INTERNAL MEDICINE

## 2024-07-26 PROCEDURE — 1036F TOBACCO NON-USER: CPT | Performed by: INTERNAL MEDICINE

## 2024-07-26 PROCEDURE — 85652 RBC SED RATE AUTOMATED: CPT

## 2024-07-26 PROCEDURE — 86140 C-REACTIVE PROTEIN: CPT

## 2024-07-26 PROCEDURE — 3078F DIAST BP <80 MM HG: CPT | Performed by: INTERNAL MEDICINE

## 2024-07-26 PROCEDURE — 3008F BODY MASS INDEX DOCD: CPT | Performed by: INTERNAL MEDICINE

## 2024-07-26 PROCEDURE — 36415 COLL VENOUS BLD VENIPUNCTURE: CPT

## 2024-07-26 PROCEDURE — 3074F SYST BP LT 130 MM HG: CPT | Performed by: INTERNAL MEDICINE

## 2024-07-26 PROCEDURE — 80053 COMPREHEN METABOLIC PANEL: CPT

## 2024-07-26 PROCEDURE — 1123F ACP DISCUSS/DSCN MKR DOCD: CPT | Performed by: INTERNAL MEDICINE

## 2024-07-26 PROCEDURE — 1160F RVW MEDS BY RX/DR IN RCRD: CPT | Performed by: INTERNAL MEDICINE

## 2024-07-26 RX ORDER — SULFAMETHOXAZOLE AND TRIMETHOPRIM 400; 80 MG/1; MG/1
1 TABLET ORAL DAILY
Qty: 30 TABLET | Refills: 2 | Status: SHIPPED | OUTPATIENT
Start: 2024-07-26 | End: 2024-10-24

## 2024-07-26 RX ORDER — PREDNISONE 10 MG/1
TABLET ORAL
Qty: 240 TABLET | Refills: 0 | Status: SHIPPED | OUTPATIENT
Start: 2024-07-26 | End: 2024-10-24

## 2024-07-26 RX ORDER — ALBUTEROL SULFATE 90 UG/1
2 AEROSOL, METERED RESPIRATORY (INHALATION) EVERY 6 HOURS PRN
Qty: 18 G | Refills: 11 | Status: SHIPPED | OUTPATIENT
Start: 2024-07-26 | End: 2025-07-26

## 2024-07-26 ASSESSMENT — PAIN SCALES - GENERAL: PAINLEVEL: 7

## 2024-07-26 ASSESSMENT — ENCOUNTER SYMPTOMS
SORE THROAT: 0
LOSS OF SENSATION IN FEET: 0
WHEEZING: 0
ABDOMINAL PAIN: 0
SHORTNESS OF BREATH: 0
UNEXPECTED WEIGHT CHANGE: 0
COUGH: 0
FEVER: 0
DEPRESSION: 0
CHILLS: 0
PALPITATIONS: 0
OCCASIONAL FEELINGS OF UNSTEADINESS: 0

## 2024-07-26 NOTE — PATIENT INSTRUCTIONS
Thank you for coming in today! Please call with questions or concerns.    Your tests show that you have sarcoidosis.   Sarcoidosis can cause inflammation and scars in the lungs, heart, brain, liver, skin, kidneys.   We treat it when it affects these tissues and is not just in the lymph nodes.   When you had the heart block, they noticed the big lymph nodes and were suspicious for sardoidosis.   We don't know why some people have sarcoidosis or what causes it. We think it is an autoimmune disease where something triggers your body to over-react and attack itself.   We treat it by decreasing your immune system. We start with steroids and then switch to another medication to help control it off the steroids.   We try to use the lease amount of steroids as possible.     Please start:  - Prednisone - this will start at 30 mg and then wean down. I hear you about the side effects and watching for diabetes. We will wean this as fast as possible while trying to treat your heart.   - Bactrim - this is an antibiotic that helps prevent opportunistic infections. Once the steroid dose goes down, we will be able to stop this.   - Albuterol - try this inhaler if you have any shortness of breath    Return to clinic with Dr. Aguila in 4-6 weeks    Call [phone] to schedule tests

## 2024-08-26 NOTE — PROGRESS NOTES
Pulmonary ILD and Sarcoidosis Clinic    Request of Pulmonary Consult by Dr Mendoza  to evaluate Jacquelyn Buenrostro For Cardiac sarcoid.    I have independently interviewed and examined the patient in the office and reviewed available records.    Physician HPI (9/9/2024):  A 68F PMH cardiac sarcoidosis, HTN, HFpEF, AV block s/p PPM, PVD who is referred to ILD and sarcoid clinic for cardiac sarcoidosis by Dr. Grey.  Patient was seen by Dr Mendoza on 7/2024  She complains of exertional SOB that has been increased during the last few month, she also complains of cough but no expectoration   She denies any chest wheezes or chest pain.  She had a CT chest that was positive for calcified lymphadenopathy and nonspecific scarring.  she underwent EBUS positive histopathology for non caseating granuloma.  She was prescribed steroid  30 mg daily with slow tapering, bactrum and albuterol prn  She had past history of heart block underwent pacemaker    Cardiac PET increased metabolic activity throughout the left ventricular wall except within the apical  inferior and inferoseptal wall, compatible with an inflammatory process such as myocarditis or sarcoidosis.    She is a never smoker  Immunization History:  Immunization History   Administered Date(s) Administered    Flu vaccine (IIV4), preservative free *Check age/dose* 10/09/2017, 10/10/2018, 10/12/2020    Flu vaccine, quadrivalent, high-dose, preservative free, age 65y+ (FLUZONE) 11/10/2023    Flu vaccine, quadrivalent, no egg protein, age 6 month or greater (FLUCELVAX) 11/15/2021    Influenza, Seasonal, Quadrivalent, Adjuvanted 09/30/2022    Influenza, injectable, MDCK, quadrivalent 10/21/2019    Influenza, seasonal, injectable 10/24/2016    Pfizer COVID-19 vaccine, bivalent, age 12 years and older (30 mcg/0.3 mL) 10/20/2022    Pfizer Purple Cap SARS-CoV-2 04/05/2021, 04/30/2021, 12/01/2021    Pneumococcal conjugate vaccine, 20-valent (PREVNAR 20) 12/05/2023    Tdap vaccine,  age 7 year and older (BOOSTRIX, ADACEL) 11/15/2022       Family History:  Family History   Problem Relation Name Age of Onset    Heart disease Mother      Breast cancer Mother      Cancer Mother      Macular degeneration Mother      Cancer Father      Breast cancer Sister  36    Cancer Brother      Cancer Other Multiple Family Member        Social History:  Social History     Socioeconomic History    Marital status:    Tobacco Use    Smoking status: Never     Passive exposure: Never    Smokeless tobacco: Never   Vaping Use    Vaping status: Never Used   Substance and Sexual Activity    Alcohol use: Not Currently     Comment: rare    Drug use: Never    Sexual activity: Not Currently       Current Medications:  Current Outpatient Medications   Medication Instructions    albuterol 90 mcg/actuation inhaler 2 puffs, inhalation, Every 6 hours PRN    amLODIPine (NORVASC) 5 mg, oral, Daily    carvedilol (COREG) 50 mg, oral, 2 times daily    furosemide (LASIX) 20 mg, oral, Daily    lisinopril 10 mg tablet 1 tablet, oral, Daily    melatonin 10 mg tablet 1 tablet, oral, Daily    methotrexate (TREXALL) 20 mg, oral, Weekly, Follow directions carefully, and ask to explain any part you do not understand. Take exactly as directed.    multivitamin-min-FA-ginkgo (One Daily Women 50 Plus) 400-120 mcg-mg tablet 1 tablet, oral, Daily    predniSONE (Deltasone) 10 mg tablet Take 3 tablets (30 mg) by mouth once daily for 60 days, THEN 2 tablets (20 mg) once daily.    simvastatin (Zocor) 20 mg tablet 1 tablet, oral, Daily    sulfamethoxazole-trimethoprim (Bactrim) 400-80 mg tablet 1 tablet, oral, Daily    vit C/E/zinc/lutein/zeaxanthin (OCUVITE EYE HEALTH ORAL) 1 tablet, oral, Daily    zoster vaccine-recombinant adjuvanted (Shingrix) 50 mcg/0.5 mL vaccine Two shots at  interval of 2-6 month        Drug Allergies/Intolerances:  Allergies   Allergen Reactions    Aspirin Unknown     Nose bleed   Per pt. Request         Review of  "Systems:      All other review of systems are negative and/or non-contributory.    Physical Examination:  /68 (BP Location: Left arm, Patient Position: Sitting, BP Cuff Size: Large adult)   Pulse 64   Temp 36.6 °C (97.9 °F) (Temporal)   Resp 16   Ht 1.651 m (5' 5\")   Wt 112 kg (246 lb)   SpO2 96%   BMI 40.94 kg/m²      General: ambulated independently; no acute distress; well-nourished; work of breathing was not increased; normal vocal character  HEENT: normocephalic; anicteric sclerae; conjunctivae not injected; nasal mucosa was unremarkable; oropharynx was clear without evidence of thrush; dentition was good.  Neck: supple; no lymphadenopathy or thyromegaly.  Chest: wheezes  Cardiac: regular rhythm; no gallop or murmur.  Abdomen: soft; non-tender; non-distended; no hepatosplenomegaly.  Extremities: edema  Psychiatric: did not appear depressed or anxious.    Pulmonary Function Test Results     Study Result    Narrative & Impression   The FEV1/FVC is normal. The FEV1 is normal. The FVC is normal. Following administration of bronchodilators, there is no significant response. The DLCO is normal; however, the diffusing capacity was not corrected for the patient's hemoglobin. Conclusion: Normal spirometry. The DLCO is normal.        Complete Pulmonary Function Test Pre/Post Bronchodialator (Spirometry Pre/Post/DLCO/Lung Volumes)  Status: Final result     Study Result    Narrative & Impression   The FEV1/FVC is normal. The FEV1 is normal. The FVC is normal. Following administration of bronchodilators, there is no significant response. The DLCO is normal; however, the diffusing capacity was not corrected for the patient's hemoglobin. Conclusion: Normal spirometry. The DLCO is normal.     Scans on Order [id number]      Pulmonary Function Test - Scan on 3/7/2024  4:16 PM                      Pulmonary Function Test - Scan on 3/17/2024  1:29 PM                      Result History    Complete Pulmonary Function " Test Pre/Post Bronchodialator (Spirometry Pre/Post/DLCO/Lung Volumes) (Order #[id number]) on 3/17/2024 - Order Result History Report - Result Edited      Complete Pulmonary Function Test Pre/Post Bronchodialator (Spirometry Pre/Post/DLCO/Lung Volumes): Patient Communication     Add Comments   Not seen     Signed by    Signed Time Phone Pager   Ana Aguila MD 3/17/2024 13:29 [phone]      Exam Information    Status Exam Begun Exam Ended   Final 3/07/2024 15:10 3/07/2024 16:10     External Results Report    Open External Results Report    Encounter    View Encounter             Recipient List for Orders      No recipients found.                Chest Radiograph     XR chest 2 view 09/02/2022    Narrative  MRN: [id number]  Patient Name: MALKA COKER    STUDY:   CHEST 2 VIEW PA AND LAT;    INDICATION:  S/P pacer .    COMPARISON:  09/01/2022 and 08/30/2022    ACCESSION NUMBER(S):  [id number]    ORDERING CLINICIAN:  KEI VILLANUEVA    FINDINGS:  Left subclavian AICD/pacemaker device noted.  The cardiac silhouette size is within normal limits.  Persistent infiltrates in the right upper and bilateral lower lung  zones.  No pneumothorax.  No acute osseous abnormality.    Impression  Persistent bilateral lung infiltrates with pneumonia not excluded.  Continued imaging follow-up is suggested.      Echocardiogram     Echocardiogram    Height: Not recorded   Weight: Not recorded   Blood Pressure: Not recorded    Date of Study: 06/21/2023   Ordering Provider: Charly Geiger DO   Clinical Indications: None Listed       Reading Physicians  Performing Staff    Talat Tavares MD    No performing staff assigned to study.     Indications    Not on file     PACS Images     Show images for Echocardiogram  Interpretation Summary       [facility]  [address] [facility] [address]  Tel [phone] Fax [phone]     TRANSTHORACIC ECHOCARDIOGRAM REPORT        Patient Name:     MALKA Kuo  Physician:  [id number] Talat COKER  Study Date:       6/20/2023     Referring           TERENCE OLVERA  Physician:  MRN/PID:          [id number]      PCP:                [id number] Kaye Grey MD  Accession/Order#: [id number]  Department          [facility]  Location:  YOB: 1955    Fellow:  Gender:           F             Nurse:  Admit Date:                     Sonographer:        Lesly Nye [facility]  Admission Status: Outpatient    Additional Staff:  Height:           165.10 cm     CC Report to:  Weight:           105.69 kg     Study Type:         Echocardiogram  BSA:              2.11 m2  Blood Pressure: 137 /83 mmHg     Diagnosis/ICD: I42.2-Other hypertrophic cardiomyopathy  Indication:    HOCM  Procedure/CPT: Echo Complete w Full Doppler-[id number]     Patient History:  BMI:               Obese >30  Pacer/Defib:       AICD  Pertinent History: HTN and Hyperlipidemia. HOCM; Previous echocardiogram  08-31-22.     Study Detail: The following Echo studies were performed: 2D, M-Mode, Doppler and  color flow.        PHYSICIAN INTERPRETATION:  Left Ventricle: Left ventricular systolic function is normal, with an estimated ejection fraction of 65-70%. There are no regional wall motion abnormalities. The left ventricular cavity size is mildly dilated. The left ventricular septal wall thickness is mildly increased. There is mildly increased left ventricular posterior wall thickness. There is mild concentric left ventricular hypertrophy. Spectral Doppler shows an impaired relaxation pattern of left ventricular diastolic filling.  Left Atrium: The left atrium is mildly dilated. There is no evidence of a patent foramen ovale.  Right Ventricle: The right ventricle is normal in size. There is normal right ventricular global systolic function.  Right Atrium: The right atrium is normal in size.  Aortic Valve: The aortic valve is trileaflet. There is trivial aortic valve regurgitation. The peak  instantaneous gradient of the aortic valve is 13.8 mmHg. The mean gradient of the aortic valve is 8.0 mmHg.  Mitral Valve: The mitral valve is normal in structure. There is mild mitral valve regurgitation.  Tricuspid Valve: The tricuspid valve is structurally normal. There is trace tricuspid regurgitation. The Doppler estimated RVSP is within normal limits at 23.1 mmHg.  Pulmonic Valve: The pulmonic valve is structurally normal. There is no indication of pulmonic valve regurgitation.  Pericardium: There is no pericardial effusion noted.  Aorta: The aortic root is normal.        CONCLUSIONS:  1. Left ventricular systolic function is normal with a 65-70% estimated ejection fraction.  2. Spectral Doppler shows an impaired relaxation pattern of left ventricular diastolic filling.  3. Mild mitral valve regurgitation.  4. RVSP within normal limits.  5. There is no evidence of a patent foramen ovale.     QUANTITATIVE DATA SUMMARY:  2D MEASUREMENTS:  Normal Ranges:  LAs:           4.60 cm    (2.7-4.0cm)  IVSd:          1.34 cm    (0.6-1.1cm)  LVPWd:         1.27 cm    (0.6-1.1cm)  LVIDd:         5.82 cm    (3.9-5.9cm)  LVIDs:         3.10 cm  LV Mass Index: 158.7 g/m2  LV % FS        46.7 %     LA VOLUME:  Normal Ranges:  LA Vol A4C:        63.4 ml    (22+/-6mL/m2)  LA Vol A2C:        74.3 ml  LA Vol BP:         69.7 ml  LA Vol Index A4C:  30.0ml/m2  LA Vol Index A2C:  35.2 ml/m2  LA Vol Index BP:   33.0 ml/m2  LA Area A4C:       21.5 cm2  LA Area A2C:       22.9 cm2  LA Major Axis A4C: 6.2 cm  LA Major Axis A2C: 6.0 cm  LA Volume Index:   34.1 ml/m2  LA Vol A4C:        58.0 ml  LA Vol A2C:        72.0 ml     M-MODE MEASUREMENTS:  Normal Ranges:  Ao Root: 3.40 cm (2.0-3.7cm)     AORTA MEASUREMENTS:  Normal Ranges:  Ao Sinus, d: 3.00 cm (2.1-3.5cm)  Ao STJ, d:   2.90 cm (1.7-3.4cm)  Asc Ao, d:   3.50 cm (2.1-3.4cm)     LV SYSTOLIC FUNCTION BY 2D PLANIMETRY (MOD):  Normal Ranges:  EF-A4C View: 63.0 % (>=55%)  EF-A2C View:  58.2 %  EF-Biplane:  60.0 %     LV DIASTOLIC FUNCTION:  Normal Ranges:  MV Peak E:        0.51 m/s    (0.7-1.2 m/s)  MV Peak A:        0.98 m/s    (0.42-0.7 m/s)  E/A Ratio:        0.52        (1.0-2.2)  MV lateral e'     0.06 m/s  MV medial e'      0.05 m/s  E/e' Ratio:       8.50        (<8.0)  PulmV Sys Rusty:    63.20 cm/s  PulmV Ervin Rusty:   40.30 cm/s  PulmV A Revs Rusty: 25.10 cm/s  PulmV A Revs Dur: 129.00 msec     MITRAL VALVE:  Normal Ranges:  MV Vmax:    0.98 m/s (<=1.3m/s)  MV peak PG: 3.8 mmHg (<5mmHg)  MV mean P.0 mmHg (<48mmHg)  MV DT:      362 msec (150-240msec)     AORTIC VALVE:  Normal Ranges:  AoV Vmax:                1.86 m/s  (<=1.7m/s)  AoV Peak P.8 mmHg (<20mmHg)  AoV Mean P.0 mmHg  (1.7-11.5mmHg)  LVOT Max Rusty:            0.95 m/s  (<=1.1m/s)  AoV VTI:                 42.60 cm  (18-25cm)  LVOT VTI:                21.60 cm  LVOT Diameter:           2.00 cm   (1.8-2.4cm)  AoV Area, VTI:           1.59 cm2  (2.5-5.5cm2)  AoV Area,Vmax:           1.60 cm2  (2.5-4.5cm2)  AoV Dimensionless Index: 0.51        RIGHT VENTRICLE:  RV 1   3.3 cm  RV 2   3.4 cm  RV 3   7.7 cm  TAPSE: 20.0 mm  RV s'  0.14 m/s     TRICUSPID VALVE/RVSP:  Normal Ranges:  Peak TR Velocity: 2.24 m/s  RV Syst Pressure: 23.1 mmHg (< 30mmHg)     PULMONIC VALVE:  Normal Ranges:  PV Accel Time: 82 msec  (>120ms)  PV Max Rusty:    1.1 m/s  (0.6-0.9m/s)  PV Max P.9 mmHg     Pulmonary Veins:  PulmV A Revs Dur: 129.00 msec  PulmV A Revs Rusty: 25.10 cm/s  PulmV Ervin Rusty:   40.30 cm/s  PulmV Sys Rusty:    63.20 cm/s        [id number] Talat Tavares MD  Electronically signed on 2023 at 4:39:58 PM           Chest CT Scan     CT chest wo IV contrast  Status: Final result     PACS Images     Show images for CT chest wo IV contrast  Signed by    Signed Time Phone Pager   Jono Ernst MD 3/23/2024 13:01 [phone]     Exam Information    Status Exam Begun Exam Ended   Final 3/22/2024 10:55 3/22/2024  11:11     Study Result    Narrative & Impression   Interpreted By:  Jono Ernst and Marshall Colin   STUDY:  CT CHEST WO IV CONTRAST;  3/22/2024 11:11 am      INDICATION:  Signs/Symptoms:sarcoid.      COMPARISON:  PET-CT dated 01/18/2024.      ACCESSION NUMBER(S):  [id number]      ORDERING CLINICIAN:  DOMI LOPEZ      TECHNIQUE:  Helical data acquisition of the chest was obtained  without IV  contrast material.  Images were reformatted in axial, coronal, and  sagittal planes.      FINDINGS:  LUNGS AND AIRWAYS:  The trachea and central airways are patent. No endobronchial lesion.      There is mosaic attenuation of the bilateral lungs, likely relating  to small airway disease. There are reticular and bandlike opacities  scattered throughout the bilateral lungs likely reflecting a  combination of subsegmental atelectasis and/or fibrosis. There is  biapical pleuroparenchymal scarring.      There scattered areas of tree-in-bud nodularity predominantly  visualized within the right middle lobe and right upper lobe as  annotated in PACS. No focal consolidation, pleural effusion, or  pneumothorax. No discrete suspicious solid pulmonary nodules.      MEDIASTINUM AND NEVAEH, LOWER NECK AND AXILLA:  The visualized thyroid gland is within normal limits.      There are numerous calcified mediastinal and hilar lymph nodes likely  reflecting sequela of prior granulomatous infection. No thoracic  lymphadenopathy by CT size criteria.      Unchanged small hiatal hernia. The esophagus is otherwise  unremarkable in appearance.      HEART AND VESSELS:  The thoracic aorta is of normal course and caliber with mild vascular  calcifications.      Main pulmonary artery and its branches are normal in caliber.      No coronary artery calcifications are seen. The study is not  optimized for evaluation of coronary arteries.      The cardiac chambers are not enlarged. Stable positioning of a left  chest wall AICD/pacemaker with leads  terminating in the right atrium  and right ventricle. Coarse mitral annular calcifications are noted.      No evidence of pericardial effusion.      UPPER ABDOMEN:  The visualized subdiaphragmatic structures demonstrate no remarkable  findings.      CHEST WALL AND OSSEOUS STRUCTURES:  The chest wall soft tissues are unremarkable. No acute osseous  abnormalities or suspicious osseous lesions.      IMPRESSION:  1. Scattered areas of tree-in-bud nodularity predominantly visualized  within the right middle lobe and right upper lobe. Findings are  compatible with an infectious/inflammatory bronchiolitis.  2. Mosaic attenuation of the bilateral lungs likely reflects sequela  of small airway disease.  3. Calcified mediastinal and hilar lymph nodes compatible with  sequela of prior granulomatous infection.  4. Unchanged small hiatal hernia.      I personally reviewed the images/study and I agree with the resident  findings as stated. This study was interpreted at [place], [place].      MACRO:  None          Co-morbidities Problem List    Assessment and Plan / Recommendations:  Problem List Items Addressed This Visit    None  Visit Diagnoses       Cardiac sarcoidosis (HHS-HCC)    -  Primary    Relevant Medications    methotrexate (Trexall) 10 mg tablet    Other Relevant Orders    CBC and Auto Differential (Completed)    Comprehensive Metabolic Panel (Completed)    Home sleep apnea test (HSAT)    Complete Pulmonary Function Test Pre/Post Bronchodialator (Spirometry Pre/Post/DLCO/Lung Volumes)    Oximetry Desat/O2 Titration (Exercise Desat)    Sedimentation rate, automated (Completed)    C-reactive protein (Completed)          Intrathoracic Sarcoidosis:  Lymphadenopathy  CT chest calcified lymphadenopathy and nonspecific scarring.    EBUS: positive for non necrotizing granuloma  Follow up PFT and 6 min walk test  Cw albuterol inhalers    Cardiac sarcoid :  PET increased metabolic  activity throughout the left ventricular wall except within the apical inferior and inferoseptal wall, compatible with an inflammatory process such as myocarditis or sarcoidosis.  Patient started on Steroid 30 mg daily  with gradual tapering  Methotrexate 20 mg / week is added  Cw Bactum   Albuterol   Follow up cardiac PET after 6 month from initiation of treatment    CKD:  Follow up Kidney function    Assessment of systemic involvement:  Annual eye exam  Liver function and CA calcium level follow up  ESR and CRP follow up  Follow up cardiac PET after 6 month from initiation of treatment    Complex diagnosis with new diagnosis of cardiac sarcoid and manifestation of active disease requires immunosupressant treatment and follow up lab /3 month, cardiac PET in 6 month of initiation of immunosuppressant treatment.        I have independently interviewed and examined the patient in the office and reviewed available records.  I have reviewed CT images and lab reports      Ana Aguila MD  09/03/2024

## 2024-08-28 DIAGNOSIS — I50.30 DIASTOLIC HEART FAILURE, STAGE C (MULTI): ICD-10-CM

## 2024-08-28 RX ORDER — FUROSEMIDE 20 MG/1
20 TABLET ORAL DAILY
Qty: 90 TABLET | Refills: 0 | Status: SHIPPED | OUTPATIENT
Start: 2024-08-28

## 2024-08-28 RX ORDER — CARVEDILOL 25 MG/1
50 TABLET ORAL 2 TIMES DAILY
Qty: 360 TABLET | Refills: 0 | Status: SHIPPED | OUTPATIENT
Start: 2024-08-28

## 2024-09-03 ENCOUNTER — LAB (OUTPATIENT)
Dept: LAB | Facility: LAB | Age: 69
End: 2024-09-03
Payer: COMMERCIAL

## 2024-09-03 ENCOUNTER — APPOINTMENT (OUTPATIENT)
Dept: PULMONOLOGY | Facility: CLINIC | Age: 69
End: 2024-09-03
Payer: COMMERCIAL

## 2024-09-03 VITALS
RESPIRATION RATE: 16 BRPM | OXYGEN SATURATION: 96 % | DIASTOLIC BLOOD PRESSURE: 68 MMHG | TEMPERATURE: 97.9 F | SYSTOLIC BLOOD PRESSURE: 108 MMHG | HEIGHT: 65 IN | WEIGHT: 246 LBS | HEART RATE: 64 BPM | BODY MASS INDEX: 40.98 KG/M2

## 2024-09-03 DIAGNOSIS — D86.85 CARDIAC SARCOIDOSIS (HHS-HCC): Primary | ICD-10-CM

## 2024-09-03 DIAGNOSIS — R59.1 LYMPHADENOPATHY: ICD-10-CM

## 2024-09-03 DIAGNOSIS — D86.85 CARDIAC SARCOIDOSIS (HHS-HCC): ICD-10-CM

## 2024-09-03 DIAGNOSIS — N18.9 CHRONIC KIDNEY DISEASE, UNSPECIFIED CKD STAGE: ICD-10-CM

## 2024-09-03 LAB
ALBUMIN SERPL BCP-MCNC: 4.1 G/DL (ref 3.4–5)
ALP SERPL-CCNC: 50 U/L (ref 33–136)
ALT SERPL W P-5'-P-CCNC: 21 U/L (ref 7–45)
ANION GAP SERPL CALC-SCNC: 13 MMOL/L (ref 10–20)
AST SERPL W P-5'-P-CCNC: 16 U/L (ref 9–39)
BASOPHILS # BLD AUTO: 0.01 X10*3/UL (ref 0–0.1)
BASOPHILS NFR BLD AUTO: 0.1 %
BILIRUB SERPL-MCNC: 0.5 MG/DL (ref 0–1.2)
BUN SERPL-MCNC: 35 MG/DL (ref 6–23)
CALCIUM SERPL-MCNC: 9.4 MG/DL (ref 8.6–10.3)
CHLORIDE SERPL-SCNC: 103 MMOL/L (ref 98–107)
CO2 SERPL-SCNC: 24 MMOL/L (ref 21–32)
CREAT SERPL-MCNC: 1.51 MG/DL (ref 0.5–1.05)
CRP SERPL-MCNC: 0.44 MG/DL
EGFRCR SERPLBLD CKD-EPI 2021: 38 ML/MIN/1.73M*2
EOSINOPHIL # BLD AUTO: 0.06 X10*3/UL (ref 0–0.7)
EOSINOPHIL NFR BLD AUTO: 0.5 %
ERYTHROCYTE [DISTWIDTH] IN BLOOD BY AUTOMATED COUNT: 15.4 % (ref 11.5–14.5)
ERYTHROCYTE [SEDIMENTATION RATE] IN BLOOD BY WESTERGREN METHOD: 12 MM/H (ref 0–30)
GLUCOSE SERPL-MCNC: 104 MG/DL (ref 74–99)
HCT VFR BLD AUTO: 39.1 % (ref 36–46)
HGB BLD-MCNC: 12.3 G/DL (ref 12–16)
IMM GRANULOCYTES # BLD AUTO: 0.12 X10*3/UL (ref 0–0.7)
IMM GRANULOCYTES NFR BLD AUTO: 1 % (ref 0–0.9)
LYMPHOCYTES # BLD AUTO: 1.51 X10*3/UL (ref 1.2–4.8)
LYMPHOCYTES NFR BLD AUTO: 12.2 %
MCH RBC QN AUTO: 29.4 PG (ref 26–34)
MCHC RBC AUTO-ENTMCNC: 31.5 G/DL (ref 32–36)
MCV RBC AUTO: 94 FL (ref 80–100)
MONOCYTES # BLD AUTO: 0.61 X10*3/UL (ref 0.1–1)
MONOCYTES NFR BLD AUTO: 4.9 %
NEUTROPHILS # BLD AUTO: 10.11 X10*3/UL (ref 1.2–7.7)
NEUTROPHILS NFR BLD AUTO: 81.3 %
NRBC BLD-RTO: 0 /100 WBCS (ref 0–0)
PLATELET # BLD AUTO: 171 X10*3/UL (ref 150–450)
POTASSIUM SERPL-SCNC: 5.1 MMOL/L (ref 3.5–5.3)
PROT SERPL-MCNC: 6.6 G/DL (ref 6.4–8.2)
RBC # BLD AUTO: 4.18 X10*6/UL (ref 4–5.2)
SODIUM SERPL-SCNC: 135 MMOL/L (ref 136–145)
WBC # BLD AUTO: 12.4 X10*3/UL (ref 4.4–11.3)

## 2024-09-03 PROCEDURE — 80053 COMPREHEN METABOLIC PANEL: CPT

## 2024-09-03 PROCEDURE — 3074F SYST BP LT 130 MM HG: CPT | Performed by: INTERNAL MEDICINE

## 2024-09-03 PROCEDURE — 3008F BODY MASS INDEX DOCD: CPT | Performed by: INTERNAL MEDICINE

## 2024-09-03 PROCEDURE — 1159F MED LIST DOCD IN RCRD: CPT | Performed by: INTERNAL MEDICINE

## 2024-09-03 PROCEDURE — 86140 C-REACTIVE PROTEIN: CPT

## 2024-09-03 PROCEDURE — 99215 OFFICE O/P EST HI 40 MIN: CPT | Performed by: INTERNAL MEDICINE

## 2024-09-03 PROCEDURE — 85652 RBC SED RATE AUTOMATED: CPT

## 2024-09-03 PROCEDURE — 3078F DIAST BP <80 MM HG: CPT | Performed by: INTERNAL MEDICINE

## 2024-09-03 PROCEDURE — 1123F ACP DISCUSS/DSCN MKR DOCD: CPT | Performed by: INTERNAL MEDICINE

## 2024-09-03 PROCEDURE — 36415 COLL VENOUS BLD VENIPUNCTURE: CPT

## 2024-09-03 PROCEDURE — 1157F ADVNC CARE PLAN IN RCRD: CPT | Performed by: INTERNAL MEDICINE

## 2024-09-03 PROCEDURE — 85025 COMPLETE CBC W/AUTO DIFF WBC: CPT

## 2024-09-09 PROBLEM — D86.85 CARDIAC SARCOIDOSIS: Status: ACTIVE | Noted: 2024-09-09

## 2024-09-09 PROBLEM — R59.1 LYMPHADENOPATHY: Status: ACTIVE | Noted: 2024-09-09

## 2024-09-11 ENCOUNTER — TELEPHONE (OUTPATIENT)
Dept: PULMONOLOGY | Facility: CLINIC | Age: 69
End: 2024-09-11

## 2024-09-11 ENCOUNTER — TELEPHONE (OUTPATIENT)
Dept: PULMONOLOGY | Facility: CLINIC | Age: 69
End: 2024-09-11
Payer: COMMERCIAL

## 2024-09-11 NOTE — TELEPHONE ENCOUNTER
Pt is on Bactrim and Methotrexate which shouldn't be take together because of possible drug interaction. , Please remove one or advise.

## 2024-09-17 ENCOUNTER — LAB (OUTPATIENT)
Dept: LAB | Facility: LAB | Age: 69
End: 2024-09-17
Payer: COMMERCIAL

## 2024-09-17 DIAGNOSIS — N18.32 CKD STAGE G3B/A2, GFR 30-44 AND ALBUMIN CREATININE RATIO 30-299 MG/G (MULTI): ICD-10-CM

## 2024-09-17 DIAGNOSIS — I10 ESSENTIAL HYPERTENSION: ICD-10-CM

## 2024-09-17 LAB
25(OH)D3 SERPL-MCNC: 40 NG/ML (ref 30–100)
ANION GAP SERPL CALC-SCNC: 12 MMOL/L (ref 10–20)
BUN SERPL-MCNC: 47 MG/DL (ref 6–23)
CALCIUM SERPL-MCNC: 9.2 MG/DL (ref 8.6–10.6)
CHLORIDE SERPL-SCNC: 106 MMOL/L (ref 98–107)
CO2 SERPL-SCNC: 27 MMOL/L (ref 21–32)
CREAT SERPL-MCNC: 1.72 MG/DL (ref 0.5–1.05)
EGFRCR SERPLBLD CKD-EPI 2021: 32 ML/MIN/1.73M*2
GLUCOSE SERPL-MCNC: 86 MG/DL (ref 74–99)
POTASSIUM SERPL-SCNC: 4.7 MMOL/L (ref 3.5–5.3)
PTH-INTACT SERPL-MCNC: 99.7 PG/ML (ref 18.5–88)
SODIUM SERPL-SCNC: 140 MMOL/L (ref 136–145)

## 2024-09-17 PROCEDURE — 80048 BASIC METABOLIC PNL TOTAL CA: CPT

## 2024-09-17 PROCEDURE — 82306 VITAMIN D 25 HYDROXY: CPT

## 2024-09-17 PROCEDURE — 36415 COLL VENOUS BLD VENIPUNCTURE: CPT

## 2024-09-17 PROCEDURE — 83970 ASSAY OF PARATHORMONE: CPT

## 2024-09-24 ENCOUNTER — LAB (OUTPATIENT)
Dept: LAB | Facility: LAB | Age: 69
End: 2024-09-24
Payer: COMMERCIAL

## 2024-09-24 ENCOUNTER — APPOINTMENT (OUTPATIENT)
Dept: NEPHROLOGY | Facility: CLINIC | Age: 69
End: 2024-09-24
Payer: COMMERCIAL

## 2024-09-24 VITALS
HEART RATE: 86 BPM | WEIGHT: 250 LBS | HEIGHT: 65 IN | SYSTOLIC BLOOD PRESSURE: 121 MMHG | DIASTOLIC BLOOD PRESSURE: 69 MMHG | BODY MASS INDEX: 41.65 KG/M2

## 2024-09-24 DIAGNOSIS — I10 ESSENTIAL HYPERTENSION: ICD-10-CM

## 2024-09-24 DIAGNOSIS — I10 ESSENTIAL HYPERTENSION: Primary | ICD-10-CM

## 2024-09-24 DIAGNOSIS — N18.32 CKD STAGE G3B/A2, GFR 30-44 AND ALBUMIN CREATININE RATIO 30-299 MG/G (MULTI): ICD-10-CM

## 2024-09-24 DIAGNOSIS — E21.3 HYPERPARATHYROIDISM (MULTI): ICD-10-CM

## 2024-09-24 LAB
ANION GAP SERPL CALC-SCNC: 13 MMOL/L (ref 10–20)
BUN SERPL-MCNC: 40 MG/DL (ref 6–23)
CALCIUM SERPL-MCNC: 8.9 MG/DL (ref 8.6–10.3)
CHLORIDE SERPL-SCNC: 104 MMOL/L (ref 98–107)
CO2 SERPL-SCNC: 28 MMOL/L (ref 21–32)
CREAT SERPL-MCNC: 1.72 MG/DL (ref 0.5–1.05)
EGFRCR SERPLBLD CKD-EPI 2021: 32 ML/MIN/1.73M*2
GLUCOSE SERPL-MCNC: 117 MG/DL (ref 74–99)
POTASSIUM SERPL-SCNC: 4.6 MMOL/L (ref 3.5–5.3)
PTH-INTACT SERPL-MCNC: 122.4 PG/ML (ref 18.5–88)
SODIUM SERPL-SCNC: 140 MMOL/L (ref 136–145)

## 2024-09-24 PROCEDURE — 1157F ADVNC CARE PLAN IN RCRD: CPT | Performed by: INTERNAL MEDICINE

## 2024-09-24 PROCEDURE — 1036F TOBACCO NON-USER: CPT | Performed by: INTERNAL MEDICINE

## 2024-09-24 PROCEDURE — 80048 BASIC METABOLIC PNL TOTAL CA: CPT

## 2024-09-24 PROCEDURE — 36415 COLL VENOUS BLD VENIPUNCTURE: CPT

## 2024-09-24 PROCEDURE — 3008F BODY MASS INDEX DOCD: CPT | Performed by: INTERNAL MEDICINE

## 2024-09-24 PROCEDURE — 1159F MED LIST DOCD IN RCRD: CPT | Performed by: INTERNAL MEDICINE

## 2024-09-24 PROCEDURE — 3074F SYST BP LT 130 MM HG: CPT | Performed by: INTERNAL MEDICINE

## 2024-09-24 PROCEDURE — 99214 OFFICE O/P EST MOD 30 MIN: CPT | Performed by: INTERNAL MEDICINE

## 2024-09-24 PROCEDURE — 1123F ACP DISCUSS/DSCN MKR DOCD: CPT | Performed by: INTERNAL MEDICINE

## 2024-09-24 PROCEDURE — 3078F DIAST BP <80 MM HG: CPT | Performed by: INTERNAL MEDICINE

## 2024-09-24 PROCEDURE — 83970 ASSAY OF PARATHORMONE: CPT

## 2024-09-24 RX ORDER — CALCITRIOL 0.25 UG/1
0.25 CAPSULE ORAL EVERY OTHER DAY
Qty: 45 CAPSULE | Refills: 3 | Status: SHIPPED | OUTPATIENT
Start: 2024-09-24 | End: 2025-09-24

## 2024-09-24 NOTE — PROGRESS NOTES
Jacquelyn Buenrostro   68 y.o.    @WT@  N/Room: [id number]/Room/bed info not found    Subjective:   The patient is being seen for a routine clinic follow-up of chronic kidney disease. Recently, the disease has been stable. Disease complications:  No hyperkalemia, no hypocalcemia, no hyperphosphatemia, no metabolic acidosis, no coagulopathy, no uremic encephalopathy, no neuropathy and no renal osteodystrophy. The patient is currently asymptomatic. No associated symptoms are reported.       Meds:   Current Outpatient Medications   Medication Sig Dispense Refill    albuterol 90 mcg/actuation inhaler Inhale 2 puffs every 6 hours if needed for wheezing. 18 g 11    amLODIPine (Norvasc) 5 mg tablet Take 1 tablet (5 mg) by mouth once daily. 45 tablet 3    carvedilol (Coreg) 25 mg tablet TAKE TWO TABLETS BY MOUTH TWO TIMES A  tablet 0    furosemide (Lasix) 20 mg tablet TAKE ONE TABLET BY MOUTH DAILY 90 tablet 0    lisinopril 10 mg tablet Take 1 tablet (10 mg) by mouth once daily.      melatonin 10 mg tablet Take 1 tablet (10 mg) by mouth once daily.      methotrexate (Trexall) 10 mg tablet Take 2 tablets (20 mg total) by mouth 1 (one) time per week.  Follow directions carefully, and ask to explain any part you do not understand. Take exactly as directed. 2 tablet 4    multivitamin-min-FA-ginkgo (One Daily Women 50 Plus) 400-120 mcg-mg tablet Take 1 tablet by mouth once daily.      predniSONE (Deltasone) 10 mg tablet Take 3 tablets (30 mg) by mouth once daily for 60 days, THEN 2 tablets (20 mg) once daily. 240 tablet 0    simvastatin (Zocor) 20 mg tablet Take 1 tablet (20 mg) by mouth once daily.      sulfamethoxazole-trimethoprim (Bactrim) 400-80 mg tablet Take 1 tablet by mouth once daily. 30 tablet 2    vit C/E/zinc/lutein/zeaxanthin (OCUVITE EYE HEALTH ORAL) Take 1 tablet by mouth once daily.      zoster vaccine-recombinant adjuvanted (Shingrix) 50 mcg/0.5 mL vaccine Two shots at  interval of 2-6 month 0.5 mL 1     No  current facility-administered medications for this visit.          ROS:  The patient is awake and oriented. No dizziness or lightheadedness. No chills and no fever. No headaches. No nausea and no vomiting. No shortness of breath. No cough. No sputum. No chest pain. No chest tightness. No abdominal pain. No diarrhea and no constipation. No hematemesis or hemoptysis. No hematuria. No rectal bleeding. No melena. No epistaxis. No urinary symptoms. No flank pain. No leg edema. No leg pain. No weakness. No itching. Overall, the rest of the review of systems is also negative.  12 point review of systems otherwise negative as stated in HPI.        Physical Examination:        Vitals:    09/24/24 1004   BP: 121/69   Pulse: 86     General: The patient is awake, oriented, and is not in any distress.  Head and Neck: Normocephalic. No periorbital edema.  Eyes: non-icteric  Respiratory: Symmetric air entry. Symmetric chest expansion.No respiratory distress.  Skin: No maculopapular rash.  Musculoskeletal: No peripheral edema in both left and right upper extremities.  No edema in either left or right lower extremities.  Neuro Exam: Speech is fluent. Moves extremities.    Imaging:  === 02/15/24 ===    US RENAL COMPLETE    - Impression -  Mildly increased bilateral parenchymal echogenicity of the kidneys  which may indicate medical renal disease.    No significant hydronephrosis.    Signed by: Nicolas Ferreira 2/16/2024 4:02 PM  Dictation workstation:   [id number]       Blood Labs:  No results found for this or any previous visit (from the past 24 hour(s)).   Lab Results   Component Value Date    PTH 99.7 (H) 09/17/2024    PHOS 3.8 09/03/2022      Lab Results   Component Value Date    GLUCOSE 86 09/17/2024    CALCIUM 9.2 09/17/2024     09/17/2024    K 4.7 09/17/2024    CO2 27 09/17/2024     09/17/2024    BUN 47 (H) 09/17/2024    CREATININE 1.72 (H) 09/17/2024         Assessment and Plan:  1- CKD III: Last Cr level is 1.72.   Slightly worse kidney function.  No recent diarrhea.  She does not take any nonsteroidal anti-inflammatory medication.  Basic metabolic panel will be repeated today.  Normal potassium and bicarb level.      2.  Hypertension.  Blood pressure is under control.  Continue the current medications.    3.  Secondary hyperparathyroidism.  I put her on calcitriol.     I will see her in about 4 month for follow-up.          Benito Castillo MD  Senior Attending Physician  Director of Onco-Nephrology Program  Division of Nephrology & Hypertension  [facility]

## 2024-09-26 ENCOUNTER — TELEPHONE (OUTPATIENT)
Dept: NEPHROLOGY | Facility: CLINIC | Age: 69
End: 2024-09-26

## 2024-09-26 ENCOUNTER — APPOINTMENT (OUTPATIENT)
Dept: NEPHROLOGY | Facility: CLINIC | Age: 69
End: 2024-09-26
Payer: COMMERCIAL

## 2024-09-26 NOTE — TELEPHONE ENCOUNTER
----- Message from Benito Castillo sent at 9/24/2024  4:11 PM EDT -----  No major change in kidney function.

## 2024-10-02 NOTE — PROGRESS NOTES
Pulmonary ILD and sarcoidosis clinic    Follow-up for cardiac sarcoidosis   I have independently interviewed and examined the patient in the office and reviewed available records.    Physician HPI (10/28/2024):  A 68F PMH cardiac sarcoidosis, HTN, HFpEF, AV block s/p PPM, PVD comes for follow up for cardiac sarcoidosis  She was diagnosed with cardiac sarcoidosis in 2023 she is currently maintained on steroid 20 mg and methotrexate  She complains of shortness of breath with exertion and winded going up stairs.  She denies any chest wheezes or chest pain.  She denies any skin problem skin rash or joint pain or but she has ankle edema she denies any cough or expectoration of phlegm  She has remarkable weight gain with the steroid use and has other side effects of steroid in the form of moon face and lower extremity swelling    Documentation of previous encounter in Epic:  She was initially referred to the ILD clinic and sarcoid clinic for cardiac sarcoidosis by Dr. Grey.  Patient was seen by Dr Menodza on 7/2024  She complains of exertional SOB that has been increased during the last few month, she also complains of cough but no expectoration   She denies any chest wheezes or chest pain.  She had a CT chest that was positive for calcified lymphadenopathy and nonspecific scarring.  she underwent EBUS positive histopathology for non caseating granuloma.  She was prescribed steroid  30 mg daily with slow tapering, bactrum and albuterol prn  She had past history of heart block underwent pacemaker     Cardiac PET increased metabolic activity throughout the left ventricular wall except within the apical  inferior and inferoseptal wall, compatible with an inflammatory process such as myocarditis or sarcoidosis      Immunization History:  Immunization History   Administered Date(s) Administered    Flu vaccine (IIV4), preservative free *Check age/dose* 10/09/2017, 10/10/2018, 10/12/2020    Flu vaccine, quadrivalent,  high-dose, preservative free, age 65y+ (FLUZONE) 11/10/2023    Flu vaccine, quadrivalent, no egg protein, age 6 month or greater (FLUCELVAX) 11/15/2021    Influenza, Seasonal, Quadrivalent, Adjuvanted 09/30/2022    Influenza, injectable, MDCK, quadrivalent 10/21/2019    Influenza, seasonal, injectable 10/24/2016    Pfizer COVID-19 vaccine, bivalent, age 12 years and older (30 mcg/0.3 mL) 10/20/2022    Pfizer Purple Cap SARS-CoV-2 04/05/2021, 04/30/2021, 12/01/2021    Pneumococcal conjugate vaccine, 20-valent (PREVNAR 20) 12/05/2023    Tdap vaccine, age 7 year and older (BOOSTRIX, ADACEL) 11/15/2022       Family History:  Family History   Problem Relation Name Age of Onset    Heart disease Mother      Breast cancer Mother      Cancer Mother      Macular degeneration Mother      Cancer Father      Breast cancer Sister  36    Cancer Brother      Cancer Other Multiple Family Member        Social History:  Social History     Socioeconomic History    Marital status:    Tobacco Use    Smoking status: Never     Passive exposure: Never    Smokeless tobacco: Never   Vaping Use    Vaping status: Never Used   Substance and Sexual Activity    Alcohol use: Not Currently     Comment: rare    Drug use: Never    Sexual activity: Not Currently       Current Medications:  Current Outpatient Medications   Medication Instructions    albuterol 90 mcg/actuation inhaler 2 puffs, inhalation, Every 6 hours PRN    amLODIPine (NORVASC) 5 mg, oral, Daily    calcitriol (ROCALTROL) 0.25 mcg, oral, Every other day    carvedilol (COREG) 50 mg, oral, 2 times daily    furosemide (LASIX) 20 mg, oral, Daily    lisinopril 10 mg tablet 1 tablet, Daily    melatonin 10 mg tablet 1 tablet, Daily    methotrexate (Trexall) 2.5 mg tablet 8 tablets, Weekly    multivitamin-min-FA-ginkgo (One Daily Women 50 Plus) 400-120 mcg-mg tablet 1 tablet, Daily    simvastatin (Zocor) 20 mg tablet 1 tablet, Daily    vit C/E/zinc/lutein/zeaxanthin (OCUVITE EYE HEALTH  "ORAL) 1 tablet, Daily        Drug Allergies/Intolerances:  Allergies   Allergen Reactions    Aspirin Unknown     Nose bleed   Per pt. Request         Review of Systems:    All other review of systems are negative and/or non-contributory.    Physical Examination:  /60 (BP Location: Left arm, Patient Position: Sitting, BP Cuff Size: Large adult)   Pulse 72   Temp 36.2 °C (97.2 °F) (Temporal)   Resp 18   Ht 1.651 m (5' 5\")   Wt 115 kg (254 lb)   SpO2 98%   BMI 42.27 kg/m²      General: Overweight   HEENT: moon face normocephalic; anicteric sclerae; conjunctivae not injected; nasal mucosa was unremarkable; oropharynx was clear without evidence of thrush; dentition was good.  Neck: supple; no lymphadenopathy or thyromegaly.  Chest: clear to auscultation bilaterally; no chest wall deformity.  Cardiac: regular rhythm; no gallop or murmur.  Abdomen: soft; non-tender; non-distended; no hepatosplenomegaly.  Extremities: LL edema and varicose veins  Psychiatric: did not appear depressed or anxious.    Pulmonary Function Test Results     Study Result   3/7/2024    Narrative & Impression   The FEV1/FVC is normal. The FEV1 is normal. The FVC is normal. Following administration of bronchodilators, there is no significant response. The DLCO is normal; however, the diffusing capacity was not corrected for the patient's hemoglobin. Conclusion: Normal spirometry. The DLCO is normal.         Chest Radiograph     XR chest 2 view 09/02/2022    Narrative  MRN: [id number]  Patient Name: MALKA COKER    STUDY:   CHEST 2 VIEW PA AND LAT;    INDICATION:  S/P pacer .    COMPARISON:  09/01/2022 and 08/30/2022    ACCESSION NUMBER(S):  [id number]    ORDERING CLINICIAN:  KEI VILLANUEVA    FINDINGS:  Left subclavian AICD/pacemaker device noted.  The cardiac silhouette size is within normal limits.  Persistent infiltrates in the right upper and bilateral lower lung  zones.  No pneumothorax.  No acute osseous " abnormality.    Impression  Persistent bilateral lung infiltrates with pneumonia not excluded.  Continued imaging follow-up is suggested.      Echocardiogram     Interpretation Summary   6/20/23       [facility] [place]  [id number] [facility], [facility] [address]  Tel [phone] Fax [phone]     TRANSTHORACIC ECHOCARDIOGRAM REPORT        Patient Name:     MALKA Kuo Physician:  [id number] Talat COKER  Study Date:       6/20/2023     Referring           TERENCE OLVERA  Physician:  MRN/PID:          [id number]      PCP:                [id number] Kaye Grey MD  Accession/Order#: [id number]  Department          [facility]  Location:  YOB: 1955    Fellow:  Gender:           F             Nurse:  Admit Date:                     Sonographer:        Lesly Nye [facility]  Admission Status: Outpatient    Additional Staff:  Height:           165.10 cm     CC Report to:  Weight:           105.69 kg     Study Type:         Echocardiogram  BSA:              2.11 m2  Blood Pressure: 137 /83 mmHg     Diagnosis/ICD: I42.2-Other hypertrophic cardiomyopathy  Indication:    HOCM  Procedure/CPT: Echo Complete w Full [id number]     Patient History:  BMI:               Obese >30  Pacer/Defib:       AICD  Pertinent History: HTN and Hyperlipidemia. HOCM; Previous echocardiogram  08-31-22.     Study Detail: The following Echo studies were performed: 2D, M-Mode, Doppler and  color flow.        PHYSICIAN INTERPRETATION:  Left Ventricle: Left ventricular systolic function is normal, with an estimated ejection fraction of 65-70%. There are no regional wall motion abnormalities. The left ventricular cavity size is mildly dilated. The left ventricular septal wall thickness is mildly increased. There is mildly increased left ventricular posterior wall thickness. There is mild concentric left ventricular hypertrophy. Spectral Doppler shows an impaired relaxation pattern of left ventricular  diastolic filling.  Left Atrium: The left atrium is mildly dilated. There is no evidence of a patent foramen ovale.  Right Ventricle: The right ventricle is normal in size. There is normal right ventricular global systolic function.  Right Atrium: The right atrium is normal in size.  Aortic Valve: The aortic valve is trileaflet. There is trivial aortic valve regurgitation. The peak instantaneous gradient of the aortic valve is 13.8 mmHg. The mean gradient of the aortic valve is 8.0 mmHg.  Mitral Valve: The mitral valve is normal in structure. There is mild mitral valve regurgitation.  Tricuspid Valve: The tricuspid valve is structurally normal. There is trace tricuspid regurgitation. The Doppler estimated RVSP is within normal limits at 23.1 mmHg.  Pulmonic Valve: The pulmonic valve is structurally normal. There is no indication of pulmonic valve regurgitation.  Pericardium: There is no pericardial effusion noted.  Aorta: The aortic root is normal.        CONCLUSIONS:  1. Left ventricular systolic function is normal with a 65-70% estimated ejection fraction.  2. Spectral Doppler shows an impaired relaxation pattern of left ventricular diastolic filling.  3. Mild mitral valve regurgitation.  4. RVSP within normal limits.  5. There is no evidence of a patent foramen ovale.     QUANTITATIVE DATA SUMMARY:  2D MEASUREMENTS:  Normal Ranges:  LAs:           4.60 cm    (2.7-4.0cm)  IVSd:          1.34 cm    (0.6-1.1cm)  LVPWd:         1.27 cm    (0.6-1.1cm)  LVIDd:         5.82 cm    (3.9-5.9cm)  LVIDs:         3.10 cm  LV Mass Index: 158.7 g/m2  LV % FS        46.7 %     LA VOLUME:  Normal Ranges:  LA Vol A4C:        63.4 ml    (22+/-6mL/m2)  LA Vol A2C:        74.3 ml  LA Vol BP:         69.7 ml  LA Vol Index A4C:  30.0ml/m2  LA Vol Index A2C:  35.2 ml/m2  LA Vol Index BP:   33.0 ml/m2  LA Area A4C:       21.5 cm2  LA Area A2C:       22.9 cm2  LA Major Axis A4C: 6.2 cm  LA Major Axis A2C: 6.0 cm  LA Volume Index:   34.1  ml/m2  LA Vol A4C:        58.0 ml  LA Vol A2C:        72.0 ml     M-MODE MEASUREMENTS:  Normal Ranges:  Ao Root: 3.40 cm (2.0-3.7cm)     AORTA MEASUREMENTS:  Normal Ranges:  Ao Sinus, d: 3.00 cm (2.1-3.5cm)  Ao STJ, d:   2.90 cm (1.7-3.4cm)  Asc Ao, d:   3.50 cm (2.1-3.4cm)     LV SYSTOLIC FUNCTION BY 2D PLANIMETRY (MOD):  Normal Ranges:  EF-A4C View: 63.0 % (>=55%)  EF-A2C View: 58.2 %  EF-Biplane:  60.0 %     LV DIASTOLIC FUNCTION:  Normal Ranges:  MV Peak E:        0.51 m/s    (0.7-1.2 m/s)  MV Peak A:        0.98 m/s    (0.42-0.7 m/s)  E/A Ratio:        0.52        (1.0-2.2)  MV lateral e'     0.06 m/s  MV medial e'      0.05 m/s  E/e' Ratio:       8.50        (<8.0)  PulmV Sys Rusty:    63.20 cm/s  PulmV Ervin Rusty:   40.30 cm/s  PulmV A Revs Rusty: 25.10 cm/s  PulmV A Revs Dur: 129.00 msec     MITRAL VALVE:  Normal Ranges:  MV Vmax:    0.98 m/s (<=1.3m/s)  MV peak PG: 3.8 mmHg (<5mmHg)  MV mean P.0 mmHg (<48mmHg)  MV DT:      362 msec (150-240msec)     AORTIC VALVE:  Normal Ranges:  AoV Vmax:                1.86 m/s  (<=1.7m/s)  AoV Peak P.8 mmHg (<20mmHg)  AoV Mean P.0 mmHg  (1.7-11.5mmHg)  LVOT Max Rusty:            0.95 m/s  (<=1.1m/s)  AoV VTI:                 42.60 cm  (18-25cm)  LVOT VTI:                21.60 cm  LVOT Diameter:           2.00 cm   (1.8-2.4cm)  AoV Area, VTI:           1.59 cm2  (2.5-5.5cm2)  AoV Area,Vmax:           1.60 cm2  (2.5-4.5cm2)  AoV Dimensionless Index: 0.51        RIGHT VENTRICLE:  RV 1   3.3 cm  RV 2   3.4 cm  RV 3   7.7 cm  TAPSE: 20.0 mm  RV s'  0.14 m/s     TRICUSPID VALVE/RVSP:  Normal Ranges:  Peak TR Velocity: 2.24 m/s  RV Syst Pressure: 23.1 mmHg (< 30mmHg)     PULMONIC VALVE:  Normal Ranges:  PV Accel Time: 82 msec  (>120ms)  PV Max Rusty:    1.1 m/s  (0.6-0.9m/s)  PV Max P.9 mmHg     Pulmonary Veins:  PulmV A Revs Dur: 129.00 msec  PulmV A Revs Rusty: 25.10 cm/s  PulmV Ervin Rusty:   40.30 cm/s  PulmV Sys Rusty:    63.20 cm/s        [id number]  Talat Tavares MD  Electronically signed on 6/21/2023 at 4:39:58 PM             Chest CT Scan     CT chest wo IV contrast  Status: Final result     PACS Images     Show images for CT chest wo IV contrast  Signed by    Signed Time Phone Pager   Jono Ernst MD 3/23/2024 13:01 [phone]     Exam Information    Status Exam Begun Exam Ended   Final 3/22/2024 10:55 3/22/2024 11:11     Study Result    Narrative & Impression   Interpreted By:  Jono Ernst and Marshall Colin   STUDY:  CT CHEST WO IV CONTRAST;  3/22/2024 11:11 am      INDICATION:  Signs/Symptoms:sarcoid.      COMPARISON:  PET-CT dated 01/18/2024.      ACCESSION NUMBER(S):  [id number]      ORDERING CLINICIAN:  DOMI LOPEZ      TECHNIQUE:  Helical data acquisition of the chest was obtained  without IV  contrast material.  Images were reformatted in axial, coronal, and  sagittal planes.      FINDINGS:  LUNGS AND AIRWAYS:  The trachea and central airways are patent. No endobronchial lesion.      There is mosaic attenuation of the bilateral lungs, likely relating  to small airway disease. There are reticular and bandlike opacities  scattered throughout the bilateral lungs likely reflecting a  combination of subsegmental atelectasis and/or fibrosis. There is  biapical pleuroparenchymal scarring.      There scattered areas of tree-in-bud nodularity predominantly  visualized within the right middle lobe and right upper lobe as  annotated in PACS. No focal consolidation, pleural effusion, or  pneumothorax. No discrete suspicious solid pulmonary nodules.      MEDIASTINUM AND NEVAEH, LOWER NECK AND AXILLA:  The visualized thyroid gland is within normal limits.      There are numerous calcified mediastinal and hilar lymph nodes likely  reflecting sequela of prior granulomatous infection. No thoracic  lymphadenopathy by CT size criteria.      Unchanged small hiatal hernia. The esophagus is otherwise  unremarkable in appearance.      HEART AND  VESSELS:  The thoracic aorta is of normal course and caliber with mild vascular  calcifications.      Main pulmonary artery and its branches are normal in caliber.      No coronary artery calcifications are seen. The study is not  optimized for evaluation of coronary arteries.      The cardiac chambers are not enlarged. Stable positioning of a left  chest wall AICD/pacemaker with leads terminating in the right atrium  and right ventricle. Coarse mitral annular calcifications are noted.      No evidence of pericardial effusion.      UPPER ABDOMEN:  The visualized subdiaphragmatic structures demonstrate no remarkable  findings.      CHEST WALL AND OSSEOUS STRUCTURES:  The chest wall soft tissues are unremarkable. No acute osseous  abnormalities or suspicious osseous lesions.      IMPRESSION:  1. Scattered areas of tree-in-bud nodularity predominantly visualized  within the right middle lobe and right upper lobe. Findings are  compatible with an infectious/inflammatory bronchiolitis.  2. Mosaic attenuation of the bilateral lungs likely reflects sequela  of small airway disease.  3. Calcified mediastinal and hilar lymph nodes compatible with  sequela of prior granulomatous infection.  4. Unchanged small hiatal hernia.      I personally reviewed the images/study and I agree with the resident  findings as stated. This study was interpreted at [place], [place].      MACRO:  None      Signed by: Jono Ernst 3/23/2024 1:01 PM  Dictation workstation:   [id number]          Co-morbidities Problem List    Assessment and Plan / Recommendations:  Problem List Items Addressed This Visit       Cardiac sarcoidosis - Primary    Effect of immunosuppressant therapy     Other Visit Diagnoses       Chronic kidney disease, unspecified CKD stage        HUGH (obstructive sleep apnea)             Intrathoracic Sarcoidosis:  Lymphadenopathy  CT chest calcified lymphadenopathy and nonspecific  scarring.    EBUS: positive for non necrotizing granuloma  6 min walk test : no desaturation  PFT obstructive   Cw albuterol inhalers     Cardiac sarcoid :  PET increased metabolic activity throughout the left ventricular wall except within the apical inferior and inferoseptal wall, compatible with an inflammatory process such as myocarditis or sarcoidosis.  Patient on tapering steroid , continue with active tapering given steroid side effects  Methotrexate 20 mg / week  Cw Bactum   Albuterol   Follow up cardiac PET after 6 month from initiation of treatment     CKD:  Follow up Kidney function       HUGH:  Home sleep study    Assessment of systemic involvement:  Annual eye exam  Liver function follow up  ESR and CRP   Follow up cardiac PET after 6 month from initiation of treatment     Complex diagnosis with new diagnosis of cardiac sarcoid and manifestation of active disease requires, on immunosupressant treatment and follow up lab /3 month, cardiac PET in 6 month of initiation of immunosuppressant treatment.    Ana Aguila MD  10/22/2024

## 2024-10-04 ENCOUNTER — APPOINTMENT (OUTPATIENT)
Dept: PULMONOLOGY | Facility: CLINIC | Age: 69
End: 2024-10-04
Payer: COMMERCIAL

## 2024-10-11 ENCOUNTER — HOSPITAL ENCOUNTER (OUTPATIENT)
Dept: RESPIRATORY THERAPY | Facility: HOSPITAL | Age: 69
Discharge: HOME | End: 2024-10-11
Payer: COMMERCIAL

## 2024-10-11 DIAGNOSIS — D86.85 CARDIAC SARCOIDOSIS: ICD-10-CM

## 2024-10-11 PROCEDURE — 94726 PLETHYSMOGRAPHY LUNG VOLUMES: CPT | Performed by: INTERNAL MEDICINE

## 2024-10-11 PROCEDURE — 94618 PULMONARY STRESS TESTING: CPT | Performed by: INTERNAL MEDICINE

## 2024-10-11 PROCEDURE — 94060 EVALUATION OF WHEEZING: CPT | Performed by: INTERNAL MEDICINE

## 2024-10-11 PROCEDURE — 94726 PLETHYSMOGRAPHY LUNG VOLUMES: CPT

## 2024-10-11 PROCEDURE — 94729 DIFFUSING CAPACITY: CPT | Performed by: INTERNAL MEDICINE

## 2024-10-13 LAB
MGC ASCENT PFT - FEV1 - POST: 1.51
MGC ASCENT PFT - FEV1 - PRE: 1.46
MGC ASCENT PFT - FEV1 - PREDICTED: 2.27
MGC ASCENT PFT - FVC - POST: 2.27
MGC ASCENT PFT - FVC - PRE: 2.27
MGC ASCENT PFT - FVC - PREDICTED: 2.91

## 2024-10-22 ENCOUNTER — OFFICE VISIT (OUTPATIENT)
Dept: PULMONOLOGY | Facility: CLINIC | Age: 69
End: 2024-10-22
Payer: COMMERCIAL

## 2024-10-22 ENCOUNTER — APPOINTMENT (OUTPATIENT)
Dept: CARDIOLOGY | Facility: CLINIC | Age: 69
End: 2024-10-22
Payer: COMMERCIAL

## 2024-10-22 VITALS
OXYGEN SATURATION: 98 % | SYSTOLIC BLOOD PRESSURE: 110 MMHG | HEIGHT: 65 IN | WEIGHT: 254 LBS | DIASTOLIC BLOOD PRESSURE: 60 MMHG | BODY MASS INDEX: 42.32 KG/M2 | HEART RATE: 72 BPM | RESPIRATION RATE: 18 BRPM | TEMPERATURE: 97.2 F

## 2024-10-22 VITALS
DIASTOLIC BLOOD PRESSURE: 78 MMHG | SYSTOLIC BLOOD PRESSURE: 104 MMHG | HEIGHT: 65 IN | HEART RATE: 90 BPM | WEIGHT: 254 LBS | BODY MASS INDEX: 42.32 KG/M2

## 2024-10-22 DIAGNOSIS — Z95.0 CARDIAC PACEMAKER: ICD-10-CM

## 2024-10-22 DIAGNOSIS — T45.1X5S: ICD-10-CM

## 2024-10-22 DIAGNOSIS — G47.33 OSA (OBSTRUCTIVE SLEEP APNEA): ICD-10-CM

## 2024-10-22 DIAGNOSIS — D86.9 SARCOIDOSIS: Primary | ICD-10-CM

## 2024-10-22 DIAGNOSIS — D86.85 CARDIAC SARCOIDOSIS: ICD-10-CM

## 2024-10-22 DIAGNOSIS — Z95.0 PACEMAKER: ICD-10-CM

## 2024-10-22 DIAGNOSIS — D86.85 CARDIAC SARCOIDOSIS: Primary | ICD-10-CM

## 2024-10-22 DIAGNOSIS — N18.9 CHRONIC KIDNEY DISEASE, UNSPECIFIED CKD STAGE: ICD-10-CM

## 2024-10-22 PROCEDURE — 3008F BODY MASS INDEX DOCD: CPT | Performed by: INTERNAL MEDICINE

## 2024-10-22 PROCEDURE — 99213 OFFICE O/P EST LOW 20 MIN: CPT | Performed by: INTERNAL MEDICINE

## 2024-10-22 PROCEDURE — 1123F ACP DISCUSS/DSCN MKR DOCD: CPT | Performed by: INTERNAL MEDICINE

## 2024-10-22 PROCEDURE — 1157F ADVNC CARE PLAN IN RCRD: CPT | Performed by: INTERNAL MEDICINE

## 2024-10-22 PROCEDURE — 1126F AMNT PAIN NOTED NONE PRSNT: CPT | Performed by: INTERNAL MEDICINE

## 2024-10-22 PROCEDURE — 3078F DIAST BP <80 MM HG: CPT | Performed by: INTERNAL MEDICINE

## 2024-10-22 PROCEDURE — 3074F SYST BP LT 130 MM HG: CPT | Performed by: INTERNAL MEDICINE

## 2024-10-22 PROCEDURE — 1036F TOBACCO NON-USER: CPT | Performed by: INTERNAL MEDICINE

## 2024-10-22 PROCEDURE — 99215 OFFICE O/P EST HI 40 MIN: CPT | Performed by: INTERNAL MEDICINE

## 2024-10-22 PROCEDURE — 1159F MED LIST DOCD IN RCRD: CPT | Performed by: INTERNAL MEDICINE

## 2024-10-22 PROCEDURE — 93280 PM DEVICE PROGR EVAL DUAL: CPT | Performed by: INTERNAL MEDICINE

## 2024-10-22 RX ORDER — METHOTREXATE 2.5 MG/1
8 TABLET ORAL WEEKLY
COMMUNITY
Start: 2024-09-25

## 2024-10-22 ASSESSMENT — PAIN SCALES - GENERAL: PAINLEVEL_OUTOF10: 0-NO PAIN

## 2024-10-22 NOTE — PROGRESS NOTES
"  Subjective:  The patient is a 68-year-old white female who presents for pacemaker follow-up.  She presented in August 2022 with complete heart block and a junctional escape rhythm in the 30s with a left bundle branch block.  She underwent Medtronic DDDR pacemaker placement on 9/1/2022, with both leads placed via the left cephalic vein.  A subsequent nuclear stress test showed questionable mild lateral ischemia, apparently not severe enough to warrant coronary angiography by the patient's regular cardiologist Dr. Linares.  The patient is noted to have normal left ventricular systolic function with left ventricular hypertrophy, biatrial enlargement, and moderate mitral and tricuspid regurgitation.  She also apparently has pulmonary sarcoidosis and probable cardiac involvement by PET scan in January 2024, and is under the care of Dr. Charly Geiger for this.    The patient is currently doing reasonably well.  She is frustrated with her \"moon face\" due to being on chronic steroids.  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  She lives in [place] with her , who works 10 hours/week at a [employer] grocery store.    Current Outpatient Medications   Medication Sig    albuterol 90 mcg/actuation inhaler Inhale 2 puffs every 6 hours if needed for wheezing.    amLODIPine (Norvasc) 5 mg tablet Take 1 tablet (5 mg) by mouth once daily.    calcitriol (Rocaltrol) 0.25 mcg capsule Take 1 capsule (0.25 mcg) by mouth every other day.    carvedilol (Coreg) 25 mg tablet TAKE TWO TABLETS BY MOUTH TWO TIMES A DAY    furosemide (Lasix) 20 mg tablet TAKE ONE TABLET BY MOUTH DAILY    lisinopril 10 mg tablet Take 1 tablet (10 mg) by mouth once daily.    melatonin 10 mg tablet Take 1 tablet (10 mg) by mouth once daily.    methotrexate (Trexall) 2.5 mg tablet 8 tablets (20 mg total) 1 (one) time per week.    multivitamin-min-FA-ginkgo (One Daily Women 50 Plus) 400-120 mcg-mg tablet Take 1 tablet by mouth once daily. "    predniSONE (Deltasone) 10 mg tablet Take 3 tablets (30 mg) by mouth once daily for 60 days, THEN 2 tablets (20 mg) once daily.    simvastatin (Zocor) 20 mg tablet Take 1 tablet (20 mg) by mouth once daily.    vit C/E/zinc/lutein/zeaxanthin (OCUVITE EYE HEALTH ORAL) Take 1 tablet by mouth once daily.     Allergies:  Aspirin     Patient Active Problem List   Diagnosis    Abnormal TSH    Anemia    Fatigue    HLD (hyperlipidemia)    HTN (hypertension), benign    Non-compliance with treatment    Pacemaker    Varicose vein of leg    Swelling of both lower extremities    Class 2 obesity with body mass index (BMI) of 38.0 to 38.9 in adult    Combined forms of age-related cataract of both eyes    Congenital hypertrophy of retinal pigment epithelium    Diastolic heart failure, stage C    Hypertrophic cardiomyopathy (Multi)    Lamellar macular hole of left eye    Retinal hemorrhage noted on examination of right eye    Diabetes mellitus screening    Obesity, morbid, BMI 40.0-49.9 (Multi)    Vaccine counseling    Need for pneumococcal vaccine    Need for hepatitis C screening test    Abnormal thyroid function test    Patient had no falls in past year    Colon cancer screening declined    Varicose veins of right lower extremity with both ulcer of other part of lower extremity and inflammation (CODE)    Suspected sleep apnea    Granulomatous disease, chronic (Multi)    Idiopathic chronic venous hypertension of left leg with inflammation    Lymphedema    Granulomatous lung disease (Multi)    Encounter to discuss test results    Pre-diabetes    CKD stage G3b/A2, GFR 30-44 and albumin creatinine ratio  mg/g (Multi)    History of spinal surgery    Contusion of face    History of fall    Chronic renal insufficiency    Acute posthemorrhagic anemia    Congestive heart failure    Contact with and (suspected) exposure to covid-19    Cough    Diastolic dysfunction    Dyspnea    Hypertensive emergency    LVH (left ventricular  "hypertrophy)    Paroxysmal nocturnal dyspnea    Pulmonary edema    Routine general medical examination at health care facility    Elevated TSH    Lymphadenopathy    Cardiac sarcoidosis     Past Surgical History:   Procedure Laterality Date    CARDIAC CATHETERIZATION      CATARACT EXTRACTION W/  INTRAOCULAR LENS IMPLANT Bilateral 10/11/2023    Dr Amaro    INSERT / REPLACE / REMOVE PACEMAKER  09/07/2022     Objective:  Vitals:    10/22/24 1340   BP: 104/78   Pulse: 90   Height:     1.651 m (5' 5\")  Weight: 115 kg (254 lb)     Exam:  Gen: Middle-age white female in no distress.  HEENT: No scleral icterus; classic moon facies with some erythema.  Neck: No jugular venous distention or thyromegaly.  Left subclavian pacemaker pocket: Normal in appearance.  Lungs: Clear to auscultation, with no wheezes or rales.  Heart: Regular rhythm without murmurs or gallops.  Abdomen: Obese but benign, with no organomegaly or masses.  Extremities: Intact distal pulses; 1+ edema below knees bilaterally.  Neuro: No focal neurologic abnormalities.  Skin: No cutaneous lesions.    Device Check:  A pacemaker check was done and demonstrated normal device function in the DDDR mode between 60 bpm and 130 bpm.  This provides over 89% atrial pacing and 99% ventricular pacing.  The lead parameters were good.  The battery longevity is estimated at 9.5 years.  There is no burden of atrial fibrillation.  Some brief nonsustained ventricular tachycardia was noted, never lasting more than 4 seconds in duration.    Impressions:  1.  Hypertension, under good control.  2.  High-grade AV block, likely secondary to cardiac sarcoidosis.  The patient is pacemaker-dependent.  3.  Status post Medtronic DDDR pacemaker placement in September 2022, with normal device function.  4.  Occasional brief nonsustained ventricular tachycardia, also typical and sarcoidosis.  5.  Chronic obesity.  6.  Lower extremity edema, perhaps related to steroid " therapy.    Recommendations:  1.  The patient's device will be checked remotely every 3 months.  2.  She will follow-up with us on an annual basis.  3.  With any sustained or symptomatic ventricular tachycardia, the patient will need to be considered for a pacemaker upgrade to an ICD.      Brian Quijano MD

## 2024-10-23 ENCOUNTER — TELEPHONE (OUTPATIENT)
Dept: PULMONOLOGY | Facility: CLINIC | Age: 69
End: 2024-10-23

## 2024-10-23 ENCOUNTER — APPOINTMENT (OUTPATIENT)
Dept: OPHTHALMOLOGY | Facility: CLINIC | Age: 69
End: 2024-10-23
Payer: COMMERCIAL

## 2024-10-23 DIAGNOSIS — H35.61 RETINAL HEMORRHAGE NOTED ON EXAMINATION OF RIGHT EYE: Primary | ICD-10-CM

## 2024-10-23 PROCEDURE — 99213 OFFICE O/P EST LOW 20 MIN: CPT | Performed by: OPHTHALMOLOGY

## 2024-10-23 PROCEDURE — 92134 CPTRZ OPH DX IMG PST SGM RTA: CPT | Mod: BILATERAL PROCEDURE | Performed by: OPHTHALMOLOGY

## 2024-10-23 ASSESSMENT — VISUAL ACUITY
OD_SC: 20/20
OS_SC: 20/20
METHOD: SNELLEN - LINEAR

## 2024-10-23 ASSESSMENT — TONOMETRY
OD_IOP_MMHG: 17
IOP_METHOD: GOLDMANN APPLANATION
OS_IOP_MMHG: 17

## 2024-10-23 ASSESSMENT — ENCOUNTER SYMPTOMS
ENDOCRINE NEGATIVE: 0
HEMATOLOGIC/LYMPHATIC NEGATIVE: 0
GASTROINTESTINAL NEGATIVE: 0
CONSTITUTIONAL NEGATIVE: 0
CARDIOVASCULAR NEGATIVE: 0
PSYCHIATRIC NEGATIVE: 0
EYES NEGATIVE: 1
ALLERGIC/IMMUNOLOGIC NEGATIVE: 0
MUSCULOSKELETAL NEGATIVE: 0
NEUROLOGICAL NEGATIVE: 0
RESPIRATORY NEGATIVE: 0

## 2024-10-23 NOTE — TELEPHONE ENCOUNTER
Patient called in saying that she saw the opthalmology today and was told she doesn't have sacoidoisis  in her eye. Just wanted to let you know

## 2024-10-24 DIAGNOSIS — D86.9 SARCOIDOSIS: ICD-10-CM

## 2024-10-28 PROBLEM — T45.1X5A EFFECT OF IMMUNOSUPPRESSANT THERAPY: Status: ACTIVE | Noted: 2024-10-28

## 2024-10-28 PROBLEM — D86.9 SARCOIDOSIS: Status: ACTIVE | Noted: 2024-09-09

## 2024-10-29 DIAGNOSIS — I50.30 DIASTOLIC HEART FAILURE, STAGE C: ICD-10-CM

## 2024-10-29 DIAGNOSIS — D86.85 CARDIAC SARCOIDOSIS: Primary | ICD-10-CM

## 2024-10-29 RX ORDER — CARVEDILOL 25 MG/1
50 TABLET ORAL 2 TIMES DAILY
Qty: 360 TABLET | Refills: 2 | Status: SHIPPED | OUTPATIENT
Start: 2024-10-29 | End: 2024-11-07 | Stop reason: SDUPTHER

## 2024-10-29 RX ORDER — FUROSEMIDE 20 MG/1
20 TABLET ORAL DAILY
Qty: 90 TABLET | Refills: 2 | Status: SHIPPED | OUTPATIENT
Start: 2024-10-29 | End: 2024-11-07 | Stop reason: SDUPTHER

## 2024-11-07 ENCOUNTER — CLINICAL SUPPORT (OUTPATIENT)
Dept: SLEEP MEDICINE | Facility: HOSPITAL | Age: 69
End: 2024-11-07
Payer: COMMERCIAL

## 2024-11-07 DIAGNOSIS — I50.30 DIASTOLIC HEART FAILURE, STAGE C: ICD-10-CM

## 2024-11-07 DIAGNOSIS — D86.9 SARCOIDOSIS: ICD-10-CM

## 2024-11-07 RX ORDER — FUROSEMIDE 20 MG/1
20 TABLET ORAL DAILY
Qty: 90 TABLET | Refills: 1 | Status: SHIPPED | OUTPATIENT
Start: 2024-11-07

## 2024-11-07 RX ORDER — CARVEDILOL 25 MG/1
50 TABLET ORAL 2 TIMES DAILY
Qty: 360 TABLET | Refills: 1 | Status: SHIPPED | OUTPATIENT
Start: 2024-11-07

## 2024-11-07 NOTE — PROGRESS NOTES
Type of Study: HOME SLEEP STUDY - NOMAD     The patient received equipment and instructions for use of the [facility] Nomad HSAT 0011 device. The patient was instructed how to apply the effort belts, cannula, thermistor. It was also explained how the Nomad and oximeter components work.  The patient was asked to record their sleep for an 8-hour period.     The patient was informed of their responsibility for the device and acknowledged this by signing the HSAT device contract. The patient was asked to return the device on 11/8/2024 between the hours of 9am to the [facility].     The patient was instructed to call 911 as usual for any medical- emergencies while at home.  The patient was also given a phone number for troubleshooting when using the device in case there were additional questions.

## 2024-11-07 NOTE — LETTER
November 13, 2024    Jacquelyn Buenrostro  [address] [place] [address]      Dear Ms. Buenrostro:    ***    If you have any questions or concerns, please don't hesitate to call.    Sincerely,      We have been unable to contact you by phone regarding a follow up appointment.  Please call the office at *** so we can assist you in getting scheduled.        Chantell Kraft        CC: No Recipients

## 2024-11-09 DIAGNOSIS — D86.9 SARCOIDOSIS: ICD-10-CM

## 2024-11-12 ENCOUNTER — PREP FOR PROCEDURE (OUTPATIENT)
Dept: PULMONOLOGY | Facility: HOSPITAL | Age: 69
End: 2024-11-12
Payer: COMMERCIAL

## 2024-11-13 ENCOUNTER — TELEPHONE (OUTPATIENT)
Dept: PULMONOLOGY | Facility: CLINIC | Age: 69
End: 2024-11-13
Payer: COMMERCIAL

## 2024-11-13 DIAGNOSIS — G47.33 OSA (OBSTRUCTIVE SLEEP APNEA): Primary | ICD-10-CM

## 2024-11-13 RX ORDER — SULFAMETHOXAZOLE AND TRIMETHOPRIM 400; 80 MG/1; MG/1
1 TABLET ORAL DAILY
Qty: 30 TABLET | Refills: 0 | Status: SHIPPED | OUTPATIENT
Start: 2024-11-13

## 2024-11-13 NOTE — TELEPHONE ENCOUNTER
I called the patient to inform her about the home sleep study results and titration study order.  Home sleep study results reviewed   I ordered titration study     Ana Aguila MD  11/13/2024  9:29 AM

## 2024-11-13 NOTE — TELEPHONE ENCOUNTER
Home sleep study results reviewed   I ordered titration study     Ana Aguila MD  11/13/2024  9:29 AM

## 2024-11-15 ENCOUNTER — APPOINTMENT (OUTPATIENT)
Dept: RADIOLOGY | Facility: HOSPITAL | Age: 69
End: 2024-11-15
Payer: COMMERCIAL

## 2024-11-18 RX ORDER — PREDNISONE 10 MG/1
TABLET ORAL
Qty: 240 TABLET | Refills: 0 | OUTPATIENT
Start: 2024-11-18

## 2024-11-21 DIAGNOSIS — D86.9 SARCOIDOSIS: Primary | ICD-10-CM

## 2024-11-21 RX ORDER — PREDNISONE 10 MG/1
10 TABLET ORAL DAILY
Qty: 30 TABLET | Refills: 3 | Status: SHIPPED | OUTPATIENT
Start: 2024-11-21 | End: 2025-03-21

## 2024-12-06 ENCOUNTER — APPOINTMENT (OUTPATIENT)
Dept: RADIOLOGY | Facility: HOSPITAL | Age: 69
End: 2024-12-06
Payer: COMMERCIAL

## 2024-12-10 ENCOUNTER — APPOINTMENT (OUTPATIENT)
Dept: CARDIOLOGY | Facility: CLINIC | Age: 69
End: 2024-12-10
Payer: COMMERCIAL

## 2024-12-10 VITALS
HEART RATE: 90 BPM | HEIGHT: 65 IN | OXYGEN SATURATION: 98 % | SYSTOLIC BLOOD PRESSURE: 129 MMHG | BODY MASS INDEX: 42.32 KG/M2 | WEIGHT: 254 LBS | DIASTOLIC BLOOD PRESSURE: 78 MMHG

## 2024-12-10 DIAGNOSIS — D86.85 CARDIAC SARCOIDOSIS: ICD-10-CM

## 2024-12-10 DIAGNOSIS — I10 ESSENTIAL HYPERTENSION: ICD-10-CM

## 2024-12-10 DIAGNOSIS — I50.30 STAGE C DIASTOLIC HEART FAILURE: Primary | ICD-10-CM

## 2024-12-10 PROCEDURE — 1157F ADVNC CARE PLAN IN RCRD: CPT | Performed by: INTERNAL MEDICINE

## 2024-12-10 PROCEDURE — 1159F MED LIST DOCD IN RCRD: CPT | Performed by: INTERNAL MEDICINE

## 2024-12-10 PROCEDURE — 3008F BODY MASS INDEX DOCD: CPT | Performed by: INTERNAL MEDICINE

## 2024-12-10 PROCEDURE — 1123F ACP DISCUSS/DSCN MKR DOCD: CPT | Performed by: INTERNAL MEDICINE

## 2024-12-10 PROCEDURE — 99214 OFFICE O/P EST MOD 30 MIN: CPT | Performed by: INTERNAL MEDICINE

## 2024-12-10 PROCEDURE — 1160F RVW MEDS BY RX/DR IN RCRD: CPT | Performed by: INTERNAL MEDICINE

## 2024-12-10 PROCEDURE — 1036F TOBACCO NON-USER: CPT | Performed by: INTERNAL MEDICINE

## 2024-12-10 PROCEDURE — 3078F DIAST BP <80 MM HG: CPT | Performed by: INTERNAL MEDICINE

## 2024-12-10 PROCEDURE — 3074F SYST BP LT 130 MM HG: CPT | Performed by: INTERNAL MEDICINE

## 2024-12-10 RX ORDER — TORSEMIDE 20 MG/1
20 TABLET ORAL DAILY
Qty: 30 TABLET | Refills: 1 | Status: SHIPPED | OUTPATIENT
Start: 2024-12-10 | End: 2025-02-08

## 2024-12-10 RX ORDER — SIMVASTATIN 20 MG/1
20 TABLET, FILM COATED ORAL DAILY
Qty: 30 TABLET | Refills: 1 | Status: SHIPPED | OUTPATIENT
Start: 2024-12-10 | End: 2025-02-08

## 2024-12-10 NOTE — PATIENT INSTRUCTIONS
It was a pleasure seeing you today. Please contact myself or my team with any questions.     To reach Dr. Geiger' office please call [phone] (Ana).   Fax: [phone]   To schedule an appointment call [phone]     If you have any questions or need cardiac medication refills, please call the Heart Failure office at [phone], option [phone]. You may also contact the  Heart Failure Nursing team via email at [email or web address] (Please include your name and date of birth).        1) Continue your current medications  2) Stop furosemide  3) Start torsemide 20 mg once a day  4) You can call Dr. Aguila's office to see if you can stop the trimethoprim-sulfamethoxazole since you are down to 1 pill of prednisone once a day  5) Follow up in 6 months at /[facility]

## 2024-12-10 NOTE — PROGRESS NOTES
[facility] Advanced Heart Failure Clinic  Primary Care Physician: Kaye Grey MD  Referring Provider/Cardiologist: Vijay     Date of Visit: 12/10/2024  1:00 PM EST  Location of visit: [address]     HPI:   Ms. Buenrostro is a 69F with a PMHx sig for HTN, stage C diastolic HF/HFpEF, high grade AV block s/p ppm (22), cardiac/pulmonary sarcoidosis, obesity, and suspected sleep apnea who returns to the Advanced Heart Failure clinic for ongoing evaluation and management.       Interval Hx:   She underwent a home sleep study which was positive. She is being set up to go to a sleep lab. In addition, she reports developing a moon facies as she is currently on a tapering dose of prednisone.     Her insurance denied a request for a repeat PET scan.     At the current time she denies chest pain, chest pressure, palpitations, shortness of breath, orthopnea, PND. She has ongoing LE edema.        Hospitalizations: -9/3- [facility], complete AV block   Medication adherence: Reports adherence       PM/SHx:   HTN, stage C diastolic HF/HFpEF, high grade AV block s/p ppm (22), cardiac/pulmonary sarcoidosis, obesity, suspected sleep apnea    SocHx:   Denies smoking, ETOH, illicits  lives alone in [place]     FamHx:   Mother  of CHF at age 92      Current Outpatient Medications   Medication Sig Dispense Refill    albuterol 90 mcg/actuation inhaler Inhale 2 puffs every 6 hours if needed for wheezing. 18 g 11    amLODIPine (Norvasc) 5 mg tablet Take 1 tablet (5 mg) by mouth once daily. 45 tablet 3    calcitriol (Rocaltrol) 0.25 mcg capsule Take 1 capsule (0.25 mcg) by mouth every other day. 45 capsule 3    carvedilol (Coreg) 25 mg tablet Take 2 tablets (50 mg) by mouth 2 times a day. 360 tablet 1    furosemide (Lasix) 20 mg tablet Take 1 tablet (20 mg) by mouth once daily. 90 tablet 1    lisinopril 10 mg tablet Take 1 tablet (10 mg) by mouth once daily.      melatonin 10 mg tablet Take 1 tablet  "(10 mg) by mouth once daily.      methotrexate (Trexall) 2.5 mg tablet 8 tablets (20 mg total) 1 (one) time per week.      multivitamin-min-FA-ginkgo (One Daily Women 50 Plus) 400-120 mcg-mg tablet Take 1 tablet by mouth once daily.      predniSONE (Deltasone) 10 mg tablet Take 1 tablet (10 mg) by mouth once daily. 30 tablet 3    simvastatin (Zocor) 20 mg tablet Take 1 tablet (20 mg) by mouth once daily.      sulfamethoxazole-trimethoprim (Bactrim) 400-80 mg tablet TAKE ONE TABLET BY MOUTH ONCE DAILY 30 tablet 0    vit C/E/zinc/lutein/zeaxanthin (OCUVITE EYE HEALTH ORAL) Take 1 tablet by mouth once daily.       No current facility-administered medications for this visit.       Allergies   Allergen Reactions    Aspirin Unknown     Nose bleed   Per pt. Request          Visit Vitals  /78 (BP Location: Left arm, Patient Position: Sitting)   Pulse 90   Ht 1.651 m (5' 5\")   Wt 115 kg (254 lb)   SpO2 98%   BMI 42.27 kg/m²   OB Status Postmenopausal   Smoking Status Never   BSA 2.3 m²        Physical Exam:  On exam Ms. Buenrostro appears her stated age, is alert and oriented x3, and in no acute distress. Her sclera are anicteric and her oropharynx has moist mucous membranes. Her neck is supple and without thyromegaly. The JVP is ~9 cm of water above the right atrium. Her cardiac exam has regular rhythm, normal S1, S2. No S3/4. There are no murmurs with provocation. Her lungs are clear to auscultation bilaterally and there is no dullness to percussion. Her abdomen is soft, nontender with normoactive bowel sounds. There is no HJR. The extremities are warm and with trace-1+ pitting edema. There are vericose veins. The skin is dry. There is no rash present. The distal pulses are 2+ in all four extremities. Her mood and affect are appropriate for todays encounter.       Cardiac Labs/Diagnostics:    Lab Results   Component Value Date    CREATININE 1.72 (H) 09/24/2024    BUN 40 (H) 09/24/2024     09/24/2024    K 4.6 " 09/24/2024     09/24/2024    CO2 28 09/24/2024        Recent Labs     02/08/24  0721 12/13/22  0807 09/27/22  0805 08/30/22  1455   CHOL 138 151 212* 178   LDLF  --  70 116* 84   LDLCALC 63  --   --   --    HDL 61.4 64.3 72.0 73.0   TRIG 70 82 120 107       Recent Labs     08/30/22  1454   *       PET (1/18/24):  2. Assuming appropriate fasting there is increased metabolic activity throughout the left ventricular wall except within the apical inferior and inferoseptal wall, compatible with an inflammatory process such as myocarditis or sarcoidosis.  3. Normal perfusion of the left ventricular myocardium, without evidence of fibrotic changes.  4. Markedly dilated left ventricular chamber with normal left ventricular motion and a left ventricular ejection fraction of 50%.  5. No evidence of hypermetabolic systemic sarcoidosis. However there is multiple mediastinal and bilateral hilar calcified lymph nodes without FDG uptake indicating chronic granulomatous disease.  6. Hypermetabolic activity seen at distal esophagus/gastroesophageal junction, correlated with minimal wall thickening. Further correlation with upper endoscopy is of value    Echo (6/20/23):  1. Left ventricular systolic function is normal with a 65-70% estimated ejection fraction.  2. Spectral Doppler shows an impaired relaxation pattern of left ventricular diastolic filling.  3. Mild mitral valve regurgitation.  4. RVSP within normal limits.  5. There is no evidence of a patent foramen ovale.    ECG (4/4/23):  A-V paced (HR 70)    cMRI (2/14/23):  1. Dilated left ventricle with moderately depressed systolic function. Ejection fraction 43%.  2. Delayed enhancement imaging reveals diffuse hyperenhancement of the basal anterior septum. Focal confluent hyperenhancement of the mid anterior ventricular junction 1.3 cm. Findings are consistent with hypertrophic cardiomyopathy. Also consider cardiac sarcoid.    Cardiac cath (1/10/23):  Mild  CAD    Echo (8/31/22):  1. Left ventricular systolic function is normal.  2. Spectral Doppler shows an impaired relaxation pattern of left ventricular diastolic filling.  3. There is moderate to severe concentric left ventricular hypertrophy.  4. Aortic valve appears abnormal.  5. Moderate aortic valve stenosis.  6. There is moderate aortic valve cusp calcification.  7. Moderate LVH with normal EF.  8. Moderate aortic stenosis PG 31/17 mmHg.  9. Diastolic dysfunction with moderate tricuspid regurgitation and dilated atria.      Impression/Plan:  Ms. Buenrostro is a 69F with a PMHx sig for HTN, stage C diastolic HF/HFpEF, high grade AV block s/p ppm (9/1/22), cardiac/pulmonary sarcoidosis, obesity, and suspected sleep apnea who returns to the Advanced Heart Failure clinic for ongoing evaluation and management. At the current time she has functional class III symptoms and is hypervolemic on exam.     1) Sarcoidosis  Discussed at HCM board. Case felt to be cardiac/pulmonary sarcoidosis. Currently on tapering prednisone and methotrexate with bactrim as PJP ppx. A request for a repeat PET scan was denied by her insurance company.   -c/w carvedilol 50 mg bid  -f/u with pulmonary re: sarcoidosis    2) Stage C acute on chronic diastolic HF/HFpEF  Currently with volume overload.   -c/w lisinopril 10 mg daily  -discontinue furosemide 20 mg daily  -start torsemide 20 mg daily    3) HTN  BP stable.  -c/w amlodipine 5 mg daily      F/U: 6 months at /[facility]    Dr. Linares,  Thank you for referring Ms. Buenrostro to the  Advanced Heart Failure Clinic. Please let me know if you have any questions.    ____________________________________________________________  Charly Geiger DO  Section of Advanced Heart Failure and Cardiac Transplantation  Division of Cardiovascular Medicine  [facility]  [facility]

## 2024-12-14 DIAGNOSIS — Z00.00 ROUTINE GENERAL MEDICAL EXAMINATION AT HEALTH CARE FACILITY: ICD-10-CM

## 2024-12-18 NOTE — TELEPHONE ENCOUNTER
Spoke with PT who will call the office 12/19/24 to update if she needs a refill of the amlodipine[ine at this time.  PT reports that as of 01/2025 she will be setting up a mail away service for her meds.

## 2024-12-20 ENCOUNTER — CLINICAL SUPPORT (OUTPATIENT)
Dept: SLEEP MEDICINE | Facility: HOSPITAL | Age: 69
End: 2024-12-20
Payer: COMMERCIAL

## 2024-12-20 VITALS — WEIGHT: 253.53 LBS | HEIGHT: 65 IN | BODY MASS INDEX: 42.24 KG/M2

## 2024-12-20 DIAGNOSIS — G47.33 OSA (OBSTRUCTIVE SLEEP APNEA): ICD-10-CM

## 2024-12-20 RX ORDER — AMLODIPINE BESYLATE 5 MG/1
5 TABLET ORAL DAILY
Refills: 0 | OUTPATIENT
Start: 2024-12-20

## 2024-12-20 ASSESSMENT — SLEEP AND FATIGUE QUESTIONNAIRES
HOW LIKELY ARE YOU TO NOD OFF OR FALL ASLEEP WHILE SITTING QUIETLY AFTER LUNCH WITHOUT ALCOHOL: WOULD NEVER DOZE
HOW LIKELY ARE YOU TO NOD OFF OR FALL ASLEEP WHILE LYING DOWN TO REST IN THE AFTERNOON WHEN CIRCUMSTANCES PERMIT: MODERATE CHANCE OF DOZING
SITING INACTIVE IN A PUBLIC PLACE LIKE A CLASS ROOM OR A MOVIE THEATER: SLIGHT CHANCE OF DOZING
HOW LIKELY ARE YOU TO NOD OFF OR FALL ASLEEP WHILE WATCHING TV: MODERATE CHANCE OF DOZING
ESS-CHAD TOTAL SCORE: 5
HOW LIKELY ARE YOU TO NOD OFF OR FALL ASLEEP IN A CAR, WHILE STOPPED FOR A FEW MINUTES IN TRAFFIC: WOULD NEVER DOZE
HOW LIKELY ARE YOU TO NOD OFF OR FALL ASLEEP WHILE SITTING AND READING: WOULD NEVER DOZE
HOW LIKELY ARE YOU TO NOD OFF OR FALL ASLEEP WHILE SITTING AND TALKING TO SOMEONE: WOULD NEVER DOZE
HOW LIKELY ARE YOU TO NOD OFF OR FALL ASLEEP WHEN YOU ARE A PASSENGER IN A CAR FOR AN HOUR WITHOUT A BREAK: WOULD NEVER DOZE

## 2024-12-21 NOTE — PROGRESS NOTES
[facility] TECH NOTE:     Patient: Jacquelyn Buenrostro   MRN//AGE: [id number]  1955  69 y.o.   Technologist: Malena Hanson   Room: 403   Service Date: 2024        Sleep Testing [place] [facility]    [place]: 5    TECHNOLOGIST SLEEP STUDY PROCEDURE NOTE:   This sleep study is being conducted according to the policies and procedures outlined by the [employer] accreditation standards.  The sleep study procedure and processes involved during this appointment was explained to the patient/patient’s family, questions were answered. The patient/family verbalized understanding.      The patient is a 69 y.o. year old female scheduled for a CPAP titration with montage of:  Standard . she arrived for her appointment.      The study that was ultimately completed was a CPAP titration with montage of:  Standard .    The full study Was completed.  Patient questionnaires completed?: yes     Consents signed? yes    Initial Fall Risk Screening:     Jacquelyn has not fallen in the last 6 months. her fall did not result in injury. Jacquelyn does not have a fear of falling. She does not need assistance with sitting, standing, or walking. she does not need assistance walking in her home. she does not need assistance in an unfamiliar setting. The patient is not using an assistive device.     Brief Study observations: The patient arrived for her ordered CPAP titration. She was diagnosed with severe HUGH from a recent HST. The patient qualifies as a falls risk. A CPAP trial/mask fitting was done prior to the hook up. The patient could not tolerate a nasal mask and requested a FFM. She preferred the FFM and wanted to start with that. The hook-up was done and the purpose of all sensors was explained. CPAP was initiated at 4 cm H2O with heated humidification, using the URBANARA Sera XS FFM. A normal sleep latency was seen. Once asleep, occasional mild snoring was seen. Some respiratory events were seen, so CPAP was increased. All sleep stages  were not seen. REM supine was captured. The patient was observed sleeping in supine, and left side positions. The patient has a pacemaker, and had some possible arrhythmias noted in the study summary.    Deviation to order/protocol and reason: N/A      If PAP, which was preferred mask/pressure/mode: [employer] Sera XS FFM, 10 cm H2O      Other: None    After the procedure, the patient/family was informed to ensure followup with ordering clinician for testing results.      Technologist: Malena Hanson

## 2024-12-30 NOTE — PROGRESS NOTES
Pulmonary ILD and sarcoidosis clinic  Follow-up for cardiac sarcoidosis   I have independently interviewed and examined the patient in the office and reviewed available records.    Physician HPI (1/7/2025):  A 69F PMH cardiac sarcoidosis, HTN, HFpEF, AV block s/p PPM, PVD comes for follow up for cardiac sarcoidosis  She was diagnosed with cardiac sarcoidosis in 2023 she is currently maintained on steroid 5 mg and methotrexate  She complains of shortness of breath with exertion and winded going up stairs.  She denies any chest wheezes or chest pain.  She denies any skin problem skin rash or joint pain or but she has ankle edema she denies any cough or expectoration of phlegm  She has remarkable weight gain with the steroid use and has other side effects of steroid in the form of moon face and lower extremity swelling    Documentation of previous encounter in Epic:  She was initially referred to the ILD clinic and sarcoid clinic for cardiac sarcoidosis by Dr. Grey.  Patient was seen by Dr Mendoza on 7/2024  She complains of exertional SOB that has been increased during the last few month, she also complains of cough but no expectoration   She denies any chest wheezes or chest pain.  She had a CT chest that was positive for calcified lymphadenopathy and nonspecific scarring.  she underwent EBUS positive histopathology for non caseating granuloma.  She was prescribed steroid  30 mg daily with slow tapering, bactrum and albuterol prn  She had past history of heart block underwent pacemaker     Cardiac PET 1/2024 increased metabolic activity throughout the left ventricular wall except within the apical  inferior and inferoseptal wall, compatible with an inflammatory process such as myocarditis or sarcoidosis      Immunization History:  Immunization History   Administered Date(s) Administered    Flu vaccine (IIV4), preservative free *Check age/dose* 10/09/2017, 10/10/2018, 10/12/2020    Flu vaccine, quadrivalent,  high-dose, preservative free, age 65y+ (FLUZONE) 11/10/2023    Flu vaccine, quadrivalent, no egg protein, age 6 month or greater (FLUCELVAX) 11/15/2021    Influenza, Seasonal, Quadrivalent, Adjuvanted 09/30/2022    Influenza, injectable, MDCK, quadrivalent 10/21/2019    Influenza, seasonal, injectable 10/24/2016    Pfizer COVID-19 vaccine, bivalent, age 12 years and older (30 mcg/0.3 mL) 10/20/2022    Pfizer Purple Cap SARS-CoV-2 04/05/2021, 04/30/2021, 12/01/2021    Pneumococcal conjugate vaccine, 20-valent (PREVNAR 20) 12/05/2023    Tdap vaccine, age 7 year and older (BOOSTRIX, ADACEL) 11/15/2022       Family History:  Family History   Problem Relation Name Age of Onset    Heart disease Mother      Breast cancer Mother      Cancer Mother      Macular degeneration Mother      Cancer Father      Breast cancer Sister  36    Cancer Brother      Cancer Other Multiple Family Member        Social History:  Social History     Socioeconomic History    Marital status:    Tobacco Use    Smoking status: Never     Passive exposure: Never    Smokeless tobacco: Never   Vaping Use    Vaping status: Never Used   Substance and Sexual Activity    Alcohol use: Not Currently     Comment: rare    Drug use: Never    Sexual activity: Not Currently       Current Medications:  Current Outpatient Medications   Medication Instructions    albuterol 90 mcg/actuation inhaler 2 puffs, inhalation, Every 6 hours PRN    amLODIPine (NORVASC) 5 mg, oral, Daily    calcitriol (ROCALTROL) 0.25 mcg, oral, Every other day    carvedilol (COREG) 50 mg, oral, 2 times daily    lisinopril 10 mg tablet 1 tablet, Daily    melatonin 10 mg tablet 1 tablet, Daily    methotrexate (Trexall) 2.5 mg tablet 8 tablets, Weekly    multivitamin-min-FA-ginkgo (One Daily Women 50 Plus) 400-120 mcg-mg tablet 1 tablet, Daily    prednisoLONE (STERANE) 5 mg, oral, Daily    predniSONE (DELTASONE) 10 mg, oral, Daily    simvastatin (ZOCOR) 20 mg, oral, Daily     "sulfamethoxazole-trimethoprim (Bactrim) 400-80 mg tablet 1 tablet, oral, Daily    torsemide (DEMADEX) 20 mg, oral, Daily    vit C/E/zinc/lutein/zeaxanthin (OCUVITE EYE HEALTH ORAL) 1 tablet, Daily        Drug Allergies/Intolerances:  Allergies   Allergen Reactions    Aspirin Unknown     Nose bleed   Per pt. Request         Review of Systems:    All other review of systems are negative and/or non-contributory.    Physical Examination:  /74   Pulse 64   Resp 18   Ht 1.651 m (5' 5\")   Wt 114 kg (251 lb 1.6 oz)   SpO2 97%   BMI 41.79 kg/m²      General: Overweight   HEENT: moon face normocephalic; anicteric sclerae; conjunctivae not injected; nasal mucosa was unremarkable; oropharynx was clear without evidence of thrush; dentition was good.  Neck: supple; no lymphadenopathy or thyromegaly.  Chest: clear to auscultation bilaterally; no chest wall deformity.  Cardiac: regular rhythm; no gallop or murmur.  Abdomen: soft; non-tender; non-distended; no hepatosplenomegaly.  Extremities: LL edema and varicose veins  Psychiatric: did not appear depressed or anxious.    Pulmonary Function Test Results     Study Result   3/7/2024    Narrative & Impression   The FEV1/FVC is normal. The FEV1 is normal. The FVC is normal. Following administration of bronchodilators, there is no significant response. The DLCO is normal; however, the diffusing capacity was not corrected for the patient's hemoglobin. Conclusion: Normal spirometry. The DLCO is normal.         Chest Radiograph     XR chest 2 view 09/02/2022    Narrative  MRN: [id number]  Patient Name: MALKA OCKER    STUDY:   CHEST 2 VIEW PA AND LAT;    INDICATION:  S/P pacer .    COMPARISON:  09/01/2022 and 08/30/2022    ACCESSION NUMBER(S):  [id number]    ORDERING CLINICIAN:  KEI VILLANUEVA    FINDINGS:  Left subclavian AICD/pacemaker device noted.  The cardiac silhouette size is within normal limits.  Persistent infiltrates in the right upper and bilateral lower " lung  zones.  No pneumothorax.  No acute osseous abnormality.    Impression  Persistent bilateral lung infiltrates with pneumonia not excluded.  Continued imaging follow-up is suggested.      Echocardiogram     Interpretation Summary   6/20/23       [facility]  [address] [address], [facility] [address]  Tel [phone] Fax [phone]     TRANSTHORACIC ECHOCARDIOGRAM REPORT        Patient Name:     MALKA Kuo Physician:  [id number] Talat COKER  Study Date:       6/20/2023     Referring           TERENCE OLVERA  Physician:  MRN/PID:          [id number]      PCP:                [id number] Kaye Grey MD  Accession/Order#: [id number]  Department          [facility]  Location:  YOB: 1955    Fellow:  Gender:           F             Nurse:  Admit Date:                     Sonographer:        Lesly Nye [facility]  Admission Status: Outpatient    Additional Staff:  Height:           165.10 cm     CC Report to:  Weight:           105.69 kg     Study Type:         Echocardiogram  BSA:              2.11 m2  Blood Pressure: 137 /83 mmHg     Diagnosis/ICD: I42.2-Other hypertrophic cardiomyopathy  Indication:    HOCM  Procedure/CPT: Echo Complete w Full [id number]     Patient History:  BMI:               Obese >30  Pacer/Defib:       AICD  Pertinent History: HTN and Hyperlipidemia. HOCM; Previous echocardiogram  08-31-22.     Study Detail: The following Echo studies were performed: 2D, M-Mode, Doppler and  color flow.        PHYSICIAN INTERPRETATION:  Left Ventricle: Left ventricular systolic function is normal, with an estimated ejection fraction of 65-70%. There are no regional wall motion abnormalities. The left ventricular cavity size is mildly dilated. The left ventricular septal wall thickness is mildly increased. There is mildly increased left ventricular posterior wall thickness. There is mild concentric left ventricular hypertrophy. Spectral Doppler shows an  impaired relaxation pattern of left ventricular diastolic filling.  Left Atrium: The left atrium is mildly dilated. There is no evidence of a patent foramen ovale.  Right Ventricle: The right ventricle is normal in size. There is normal right ventricular global systolic function.  Right Atrium: The right atrium is normal in size.  Aortic Valve: The aortic valve is trileaflet. There is trivial aortic valve regurgitation. The peak instantaneous gradient of the aortic valve is 13.8 mmHg. The mean gradient of the aortic valve is 8.0 mmHg.  Mitral Valve: The mitral valve is normal in structure. There is mild mitral valve regurgitation.  Tricuspid Valve: The tricuspid valve is structurally normal. There is trace tricuspid regurgitation. The Doppler estimated RVSP is within normal limits at 23.1 mmHg.  Pulmonic Valve: The pulmonic valve is structurally normal. There is no indication of pulmonic valve regurgitation.  Pericardium: There is no pericardial effusion noted.  Aorta: The aortic root is normal.        CONCLUSIONS:  1. Left ventricular systolic function is normal with a 65-70% estimated ejection fraction.  2. Spectral Doppler shows an impaired relaxation pattern of left ventricular diastolic filling.  3. Mild mitral valve regurgitation.  4. RVSP within normal limits.  5. There is no evidence of a patent foramen ovale.     QUANTITATIVE DATA SUMMARY:  2D MEASUREMENTS:  Normal Ranges:  LAs:           4.60 cm    (2.7-4.0cm)  IVSd:          1.34 cm    (0.6-1.1cm)  LVPWd:         1.27 cm    (0.6-1.1cm)  LVIDd:         5.82 cm    (3.9-5.9cm)  LVIDs:         3.10 cm  LV Mass Index: 158.7 g/m2  LV % FS        46.7 %     LA VOLUME:  Normal Ranges:  LA Vol A4C:        63.4 ml    (22+/-6mL/m2)  LA Vol A2C:        74.3 ml  LA Vol BP:         69.7 ml  LA Vol Index A4C:  30.0ml/m2  LA Vol Index A2C:  35.2 ml/m2  LA Vol Index BP:   33.0 ml/m2  LA Area A4C:       21.5 cm2  LA Area A2C:       22.9 cm2  LA Major Axis A4C: 6.2 cm  LA  Major Axis A2C: 6.0 cm  LA Volume Index:   34.1 ml/m2  LA Vol A4C:        58.0 ml  LA Vol A2C:        72.0 ml     M-MODE MEASUREMENTS:  Normal Ranges:  Ao Root: 3.40 cm (2.0-3.7cm)     AORTA MEASUREMENTS:  Normal Ranges:  Ao Sinus, d: 3.00 cm (2.1-3.5cm)  Ao STJ, d:   2.90 cm (1.7-3.4cm)  Asc Ao, d:   3.50 cm (2.1-3.4cm)     LV SYSTOLIC FUNCTION BY 2D PLANIMETRY (MOD):  Normal Ranges:  EF-A4C View: 63.0 % (>=55%)  EF-A2C View: 58.2 %  EF-Biplane:  60.0 %     LV DIASTOLIC FUNCTION:  Normal Ranges:  MV Peak E:        0.51 m/s    (0.7-1.2 m/s)  MV Peak A:        0.98 m/s    (0.42-0.7 m/s)  E/A Ratio:        0.52        (1.0-2.2)  MV lateral e'     0.06 m/s  MV medial e'      0.05 m/s  E/e' Ratio:       8.50        (<8.0)  PulmV Sys Rusty:    63.20 cm/s  PulmV Ervin Rusty:   40.30 cm/s  PulmV A Revs Rusty: 25.10 cm/s  PulmV A Revs Dur: 129.00 msec     MITRAL VALVE:  Normal Ranges:  MV Vmax:    0.98 m/s (<=1.3m/s)  MV peak PG: 3.8 mmHg (<5mmHg)  MV mean P.0 mmHg (<48mmHg)  MV DT:      362 msec (150-240msec)     AORTIC VALVE:  Normal Ranges:  AoV Vmax:                1.86 m/s  (<=1.7m/s)  AoV Peak P.8 mmHg (<20mmHg)  AoV Mean P.0 mmHg  (1.7-11.5mmHg)  LVOT Max Ursty:            0.95 m/s  (<=1.1m/s)  AoV VTI:                 42.60 cm  (18-25cm)  LVOT VTI:                21.60 cm  LVOT Diameter:           2.00 cm   (1.8-2.4cm)  AoV Area, VTI:           1.59 cm2  (2.5-5.5cm2)  AoV Area,Vmax:           1.60 cm2  (2.5-4.5cm2)  AoV Dimensionless Index: 0.51        RIGHT VENTRICLE:  RV 1   3.3 cm  RV 2   3.4 cm  RV 3   7.7 cm  TAPSE: 20.0 mm  RV s'  0.14 m/s     TRICUSPID VALVE/RVSP:  Normal Ranges:  Peak TR Velocity: 2.24 m/s  RV Syst Pressure: 23.1 mmHg (< 30mmHg)     PULMONIC VALVE:  Normal Ranges:  PV Accel Time: 82 msec  (>120ms)  PV Max Rusty:    1.1 m/s  (0.6-0.9m/s)  PV Max P.9 mmHg     Pulmonary Veins:  PulmV A Revs Dur: 129.00 msec  PulmV A Revs Rusty: 25.10 cm/s  PulmV Ervin Rusty:   40.30  cm/s  PulmV Sys Rusty:    63.20 cm/s        [id number] Talat Tavares MD  Electronically signed on 6/21/2023 at 4:39:58 PM             Chest CT Scan     CT chest wo IV contrast  Status: Final result     PACS Images     Show images for CT chest wo IV contrast  Signed by    Signed Time Phone Pager   Jono Ernst MD 3/23/2024 13:01 [phone]     Exam Information    Status Exam Begun Exam Ended   Final 3/22/2024 10:55 3/22/2024 11:11     Study Result    Narrative & Impression   Interpreted By:  Jono Ernst and Marshall Colin   STUDY:  CT CHEST WO IV CONTRAST;  3/22/2024 11:11 am      INDICATION:  Signs/Symptoms:sarcoid.      COMPARISON:  PET-CT dated 01/18/2024.      ACCESSION NUMBER(S):  [id number]      ORDERING CLINICIAN:  DOMI LOPEZ      TECHNIQUE:  Helical data acquisition of the chest was obtained  without IV  contrast material.  Images were reformatted in axial, coronal, and  sagittal planes.      FINDINGS:  LUNGS AND AIRWAYS:  The trachea and central airways are patent. No endobronchial lesion.      There is mosaic attenuation of the bilateral lungs, likely relating  to small airway disease. There are reticular and bandlike opacities  scattered throughout the bilateral lungs likely reflecting a  combination of subsegmental atelectasis and/or fibrosis. There is  biapical pleuroparenchymal scarring.      There scattered areas of tree-in-bud nodularity predominantly  visualized within the right middle lobe and right upper lobe as  annotated in PACS. No focal consolidation, pleural effusion, or  pneumothorax. No discrete suspicious solid pulmonary nodules.      MEDIASTINUM AND NEVAEH, LOWER NECK AND AXILLA:  The visualized thyroid gland is within normal limits.      There are numerous calcified mediastinal and hilar lymph nodes likely  reflecting sequela of prior granulomatous infection. No thoracic  lymphadenopathy by CT size criteria.      Unchanged small hiatal hernia. The esophagus is  otherwise  unremarkable in appearance.      HEART AND VESSELS:  The thoracic aorta is of normal course and caliber with mild vascular  calcifications.      Main pulmonary artery and its branches are normal in caliber.      No coronary artery calcifications are seen. The study is not  optimized for evaluation of coronary arteries.      The cardiac chambers are not enlarged. Stable positioning of a left  chest wall AICD/pacemaker with leads terminating in the right atrium  and right ventricle. Coarse mitral annular calcifications are noted.      No evidence of pericardial effusion.      UPPER ABDOMEN:  The visualized subdiaphragmatic structures demonstrate no remarkable  findings.      CHEST WALL AND OSSEOUS STRUCTURES:  The chest wall soft tissues are unremarkable. No acute osseous  abnormalities or suspicious osseous lesions.      IMPRESSION:  1. Scattered areas of tree-in-bud nodularity predominantly visualized  within the right middle lobe and right upper lobe. Findings are  compatible with an infectious/inflammatory bronchiolitis.  2. Mosaic attenuation of the bilateral lungs likely reflects sequela  of small airway disease.  3. Calcified mediastinal and hilar lymph nodes compatible with  sequela of prior granulomatous infection.  4. Unchanged small hiatal hernia.      I personally reviewed the images/study and I agree with the resident  findings as stated. This study was interpreted at [facility], [place], [place].      MACRO:  None      Signed by: Jono Ernst 3/23/2024 1:01 PM  Dictation workstation:   [id number]      PET 1/2024  2. Assuming appropriate fasting there is increased metabolic activity  throughout the left ventricular wall except within the apical  inferior and inferoseptal wall, compatible with an inflammatory  process such as myocarditis or sarcoidosis.  3. Normal perfusion of the left ventricular myocardium, without  evidence of fibrotic changes.  4.  Markedly dilated left ventricular chamber with normal left  ventricular motion and a left ventricular ejection fraction of 50%.  5. No evidence of hypermetabolic systemic sarcoidosis. However there  is multiple mediastinal and bilateral hilar calcified lymph nodes  without FDG uptake indicating chronic granulomatous disease.  6. Hypermetabolic activity seen at distal esophagus/gastroesophageal  junction, correlated with minimal wall thickening. Further  correlation with upper endoscopy is of value.    Assessment and Plan / Recommendations:  Problem List Items Addressed This Visit    None  Visit Diagnoses       Sarcoidosis    -  Primary    Relevant Medications    prednisoLONE (Sterane) 5 mg tablet tablet    Other Relevant Orders    Sedimentation Rate    C-Reactive Protein    Parathyroid Hormone, Intact (Completed)    Comprehensive Metabolic Panel (Completed)    CBC and Auto Differential (Completed)    Vitamin D 1,25 Dihydroxy (for eval of hypercalcemia) (Completed)    Positive Airway Pressure (PAP) Therapy           Intrathoracic Sarcoidosis:  Lymphadenopathy  CT chest calcified lymphadenopathy and nonspecific scarring.    EBUS: positive for non necrotizing granuloma  6 min walk test : no desaturation  PFT obstructive   Cw albuterol inhalers     Cardiac sarcoid :  PET 1/2024  PET increased metabolic activity throughout the left ventricular wall except within the apical inferior and inferoseptal wall, compatible with an inflammatory process such as myocarditis or sarcoidosis.  Patient on tapering steroid , continue with active tapering given steroid side effects  Methotrexate 20 mg / week  Follow up CBC and CMP  Albuterol   Follow up cardiac PET to assess the response on immunosuppressant and to guide the decision of escalation (switch to another immunosuppressant as remicade) versus de escalation of immunosuppressant, patient has side effects of steroids that needs to be further weaned/discontinued     CKD:  Follow  up Kidney function       HUGH:  Positive sleep study  Cpap machine ordered    Assessment of systemic involvement:  Annual eye exam  Liver function follow up  ESR and CRP   Follow up cardiac PET    Complex diagnosis with new diagnosis of cardiac sarcoid and manifestation of active disease requires, on immunosupressant treatment and follow up lab /3 month,   Follow up cardiac PET to assess the response on immunosuppressant and to guide the decision of escalation (switch to another immunosuppressant as remicade) versus de escalation of immunosuppressant, patient has side effects of steroids that needs to be further weaned/discontinued    Ana Aguila MD  12/31/2024

## 2024-12-31 ENCOUNTER — APPOINTMENT (OUTPATIENT)
Dept: PULMONOLOGY | Facility: CLINIC | Age: 69
End: 2024-12-31
Payer: COMMERCIAL

## 2024-12-31 ENCOUNTER — LAB (OUTPATIENT)
Dept: LAB | Facility: LAB | Age: 69
End: 2024-12-31
Payer: COMMERCIAL

## 2024-12-31 VITALS
SYSTOLIC BLOOD PRESSURE: 106 MMHG | HEART RATE: 64 BPM | RESPIRATION RATE: 18 BRPM | HEIGHT: 65 IN | BODY MASS INDEX: 41.84 KG/M2 | OXYGEN SATURATION: 97 % | WEIGHT: 251.1 LBS | DIASTOLIC BLOOD PRESSURE: 74 MMHG

## 2024-12-31 DIAGNOSIS — G47.33 OSA (OBSTRUCTIVE SLEEP APNEA): ICD-10-CM

## 2024-12-31 DIAGNOSIS — D86.85 CARDIAC SARCOIDOSIS: ICD-10-CM

## 2024-12-31 DIAGNOSIS — D86.9 SARCOIDOSIS: ICD-10-CM

## 2024-12-31 DIAGNOSIS — D84.9 IMMUNOSUPPRESSED STATUS: ICD-10-CM

## 2024-12-31 DIAGNOSIS — D86.9 SARCOIDOSIS: Primary | ICD-10-CM

## 2024-12-31 LAB
ALBUMIN SERPL BCP-MCNC: 4.2 G/DL (ref 3.4–5)
ALP SERPL-CCNC: 43 U/L (ref 33–136)
ALT SERPL W P-5'-P-CCNC: 13 U/L (ref 7–45)
ANION GAP SERPL CALC-SCNC: 12 MMOL/L (ref 10–20)
AST SERPL W P-5'-P-CCNC: 14 U/L (ref 9–39)
BASOPHILS # BLD AUTO: 0.04 X10*3/UL (ref 0–0.1)
BASOPHILS NFR BLD AUTO: 0.4 %
BILIRUB SERPL-MCNC: 0.5 MG/DL (ref 0–1.2)
BUN SERPL-MCNC: 35 MG/DL (ref 6–23)
CALCIUM SERPL-MCNC: 9.1 MG/DL (ref 8.6–10.3)
CHLORIDE SERPL-SCNC: 100 MMOL/L (ref 98–107)
CO2 SERPL-SCNC: 30 MMOL/L (ref 21–32)
CREAT SERPL-MCNC: 1.45 MG/DL (ref 0.5–1.05)
CRP SERPL-MCNC: 0.86 MG/DL
EGFRCR SERPLBLD CKD-EPI 2021: 39 ML/MIN/1.73M*2
EOSINOPHIL # BLD AUTO: 0.23 X10*3/UL (ref 0–0.7)
EOSINOPHIL NFR BLD AUTO: 2.3 %
ERYTHROCYTE [DISTWIDTH] IN BLOOD BY AUTOMATED COUNT: 15.1 % (ref 11.5–14.5)
ERYTHROCYTE [SEDIMENTATION RATE] IN BLOOD BY WESTERGREN METHOD: 4 MM/H (ref 0–30)
GLUCOSE SERPL-MCNC: 79 MG/DL (ref 74–99)
HCT VFR BLD AUTO: 33.1 % (ref 36–46)
HGB BLD-MCNC: 10 G/DL (ref 12–16)
IMM GRANULOCYTES # BLD AUTO: 0.06 X10*3/UL (ref 0–0.7)
IMM GRANULOCYTES NFR BLD AUTO: 0.6 % (ref 0–0.9)
LYMPHOCYTES # BLD AUTO: 2.03 X10*3/UL (ref 1.2–4.8)
LYMPHOCYTES NFR BLD AUTO: 20.2 %
MCH RBC QN AUTO: 31.6 PG (ref 26–34)
MCHC RBC AUTO-ENTMCNC: 30.2 G/DL (ref 32–36)
MCV RBC AUTO: 105 FL (ref 80–100)
MONOCYTES # BLD AUTO: 0.93 X10*3/UL (ref 0.1–1)
MONOCYTES NFR BLD AUTO: 9.2 %
NEUTROPHILS # BLD AUTO: 6.78 X10*3/UL (ref 1.2–7.7)
NEUTROPHILS NFR BLD AUTO: 67.3 %
NRBC BLD-RTO: 0 /100 WBCS (ref 0–0)
PLATELET # BLD AUTO: 202 X10*3/UL (ref 150–450)
POTASSIUM SERPL-SCNC: 4.5 MMOL/L (ref 3.5–5.3)
PROT SERPL-MCNC: 6.1 G/DL (ref 6.4–8.2)
RBC # BLD AUTO: 3.16 X10*6/UL (ref 4–5.2)
SODIUM SERPL-SCNC: 137 MMOL/L (ref 136–145)
WBC # BLD AUTO: 10.1 X10*3/UL (ref 4.4–11.3)

## 2024-12-31 PROCEDURE — 1123F ACP DISCUSS/DSCN MKR DOCD: CPT | Performed by: INTERNAL MEDICINE

## 2024-12-31 PROCEDURE — 85025 COMPLETE CBC W/AUTO DIFF WBC: CPT

## 2024-12-31 PROCEDURE — 3078F DIAST BP <80 MM HG: CPT | Performed by: INTERNAL MEDICINE

## 2024-12-31 PROCEDURE — G2211 COMPLEX E/M VISIT ADD ON: HCPCS | Performed by: INTERNAL MEDICINE

## 2024-12-31 PROCEDURE — 3008F BODY MASS INDEX DOCD: CPT | Performed by: INTERNAL MEDICINE

## 2024-12-31 PROCEDURE — 1157F ADVNC CARE PLAN IN RCRD: CPT | Performed by: INTERNAL MEDICINE

## 2024-12-31 PROCEDURE — 82652 VIT D 1 25-DIHYDROXY: CPT

## 2024-12-31 PROCEDURE — 86140 C-REACTIVE PROTEIN: CPT

## 2024-12-31 PROCEDURE — 36415 COLL VENOUS BLD VENIPUNCTURE: CPT

## 2024-12-31 PROCEDURE — 83970 ASSAY OF PARATHORMONE: CPT

## 2024-12-31 PROCEDURE — 80053 COMPREHEN METABOLIC PANEL: CPT

## 2024-12-31 PROCEDURE — 85652 RBC SED RATE AUTOMATED: CPT

## 2024-12-31 PROCEDURE — 1159F MED LIST DOCD IN RCRD: CPT | Performed by: INTERNAL MEDICINE

## 2024-12-31 PROCEDURE — 3074F SYST BP LT 130 MM HG: CPT | Performed by: INTERNAL MEDICINE

## 2024-12-31 PROCEDURE — 99215 OFFICE O/P EST HI 40 MIN: CPT | Performed by: INTERNAL MEDICINE

## 2024-12-31 RX ORDER — PREDNISOLONE 5 MG/1
5 TABLET ORAL DAILY
Qty: 90 TABLET | Refills: 3 | Status: SHIPPED | OUTPATIENT
Start: 2024-12-31

## 2024-12-31 ASSESSMENT — ENCOUNTER SYMPTOMS
DEPRESSION: 0
OCCASIONAL FEELINGS OF UNSTEADINESS: 0
LOSS OF SENSATION IN FEET: 0

## 2024-12-31 ASSESSMENT — COPD QUESTIONNAIRES
CAT_TOTALSCORE: 15
QUESTION5_HOMEACTIVITIES: 3
QUESTION1_COUGHFREQUENCY: 1
QUESTION4_WALKINCLINE: 4
QUESTION3_CHESTTIGHTNESS: 2
QUESTION7_SLEEPQUALITY: 1
QUESTION8_ENERGYLEVEL: 2
QUESTION2_CHESTPHLEGM: 0 - I HAVE NO PHLEGM (MUCUS) IN MY CHEST AT ALL
QUESTION6_LEAVINGHOUSE: 2

## 2025-01-01 LAB — PTH-INTACT SERPL-MCNC: 117.5 PG/ML (ref 18.5–88)

## 2025-01-02 DIAGNOSIS — Z00.00 ROUTINE GENERAL MEDICAL EXAMINATION AT HEALTH CARE FACILITY: ICD-10-CM

## 2025-01-03 LAB — 1,25(OH)2D SERPL-MCNC: 37 PG/ML (ref 19.9–79.3)

## 2025-01-06 ENCOUNTER — TELEPHONE (OUTPATIENT)
Facility: CLINIC | Age: 70
End: 2025-01-06
Payer: MEDICARE

## 2025-01-07 PROBLEM — D84.9 IMMUNOSUPPRESSED STATUS: Status: ACTIVE | Noted: 2025-01-07

## 2025-01-08 RX ORDER — AMLODIPINE BESYLATE 5 MG/1
5 TABLET ORAL DAILY
Qty: 90 TABLET | Refills: 1 | Status: SHIPPED | OUTPATIENT
Start: 2025-01-08

## 2025-01-17 ENCOUNTER — APPOINTMENT (OUTPATIENT)
Dept: PRIMARY CARE | Facility: CLINIC | Age: 70
End: 2025-01-17
Payer: COMMERCIAL

## 2025-01-20 ENCOUNTER — TELEPHONE (OUTPATIENT)
Dept: PULMONOLOGY | Facility: CLINIC | Age: 70
End: 2025-01-20
Payer: MEDICARE

## 2025-01-20 NOTE — TELEPHONE ENCOUNTER
"Patient was stating: \"Insurance will not pay for my prednisone anymore.\" I explained I would look into it and see what I can find.I started a PA in the system.   "

## 2025-01-24 ENCOUNTER — APPOINTMENT (OUTPATIENT)
Dept: NEPHROLOGY | Facility: CLINIC | Age: 70
End: 2025-01-24
Payer: COMMERCIAL

## 2025-01-24 VITALS
DIASTOLIC BLOOD PRESSURE: 65 MMHG | HEIGHT: 65 IN | BODY MASS INDEX: 42.85 KG/M2 | HEART RATE: 75 BPM | WEIGHT: 257.2 LBS | SYSTOLIC BLOOD PRESSURE: 126 MMHG

## 2025-01-24 DIAGNOSIS — E21.3 HYPERPARATHYROIDISM (MULTI): ICD-10-CM

## 2025-01-24 DIAGNOSIS — N18.32 CKD STAGE G3B/A2, GFR 30-44 AND ALBUMIN CREATININE RATIO 30-299 MG/G (MULTI): ICD-10-CM

## 2025-01-24 DIAGNOSIS — I10 ESSENTIAL HYPERTENSION: Primary | ICD-10-CM

## 2025-01-24 PROCEDURE — 1123F ACP DISCUSS/DSCN MKR DOCD: CPT | Performed by: INTERNAL MEDICINE

## 2025-01-24 PROCEDURE — 3078F DIAST BP <80 MM HG: CPT | Performed by: INTERNAL MEDICINE

## 2025-01-24 PROCEDURE — 3074F SYST BP LT 130 MM HG: CPT | Performed by: INTERNAL MEDICINE

## 2025-01-24 PROCEDURE — 99213 OFFICE O/P EST LOW 20 MIN: CPT | Performed by: INTERNAL MEDICINE

## 2025-01-24 PROCEDURE — 1159F MED LIST DOCD IN RCRD: CPT | Performed by: INTERNAL MEDICINE

## 2025-01-24 PROCEDURE — 1157F ADVNC CARE PLAN IN RCRD: CPT | Performed by: INTERNAL MEDICINE

## 2025-01-24 PROCEDURE — 3008F BODY MASS INDEX DOCD: CPT | Performed by: INTERNAL MEDICINE

## 2025-01-24 PROCEDURE — 1036F TOBACCO NON-USER: CPT | Performed by: INTERNAL MEDICINE

## 2025-01-24 NOTE — PROGRESS NOTES
Jacquelyn Buenrostro   69 y.o.    @WT@  N/Room: [id number]/Room/bed info not found    Subjective:   The patient is being seen for a routine clinic follow-up of chronic kidney disease. Recently, the disease has been stable. Disease complications:  No hyperkalemia, no hypocalcemia, no hyperphosphatemia, no metabolic acidosis, no coagulopathy, no uremic encephalopathy, no neuropathy and no renal osteodystrophy. The patient is currently asymptomatic. No associated symptoms are reported.       Meds:   Current Outpatient Medications   Medication Sig Dispense Refill    albuterol 90 mcg/actuation inhaler Inhale 2 puffs every 6 hours if needed for wheezing. 18 g 11    amLODIPine (Norvasc) 5 mg tablet TAKE ONE TABLET BY MOUTH DAILY AT 9AM 90 tablet 1    calcitriol (Rocaltrol) 0.25 mcg capsule Take 1 capsule (0.25 mcg) by mouth every other day. 45 capsule 3    carvedilol (Coreg) 25 mg tablet Take 2 tablets (50 mg) by mouth 2 times a day. 360 tablet 1    lisinopril 10 mg tablet Take 1 tablet (10 mg) by mouth once daily.      melatonin 10 mg tablet Take 1 tablet (10 mg) by mouth once daily.      methotrexate (Trexall) 2.5 mg tablet 8 tablets (20 mg total) 1 (one) time per week.      multivitamin-min-FA-ginkgo (One Daily Women 50 Plus) 400-120 mcg-mg tablet Take 1 tablet by mouth once daily.      prednisoLONE (Sterane) 5 mg tablet tablet Take 1 tablet (5 mg) by mouth once daily. 90 tablet 3    simvastatin (Zocor) 20 mg tablet Take 1 tablet (20 mg) by mouth once daily. 30 tablet 1    torsemide (Demadex) 20 mg tablet Take 1 tablet (20 mg) by mouth once daily. 30 tablet 1    vit C/E/zinc/lutein/zeaxanthin (OCUVITE EYE HEALTH ORAL) Take 1 tablet by mouth once daily.       No current facility-administered medications for this visit.          ROS:  The patient is awake and oriented. No dizziness or lightheadedness. No chills and no fever. No headaches. No nausea and no vomiting. No shortness of breath. No cough. No chest pain. No abdominal  pain. No diarrhea. No hematemesis or hemoptysis. No hematuria. No rectal bleeding. No melena. No epistaxis. No urinary symptoms. No flank pain. No leg edema. No itching. Overall, the rest of the review of systems is also negative.  12 point review of systems otherwise negative as stated in HPI.        Physical Examination:        Vitals:    01/24/25 1048   BP: 126/65   Pulse: 75     General: The patient is awake, oriented, and is not in any distress.  Head and Neck: Normocephalic. No periorbital edema.  Eyes: non-icteric  Respiratory: Symmetric chest expansion. No respiratory distress.  Skin: No maculopapular rash.  Musculoskeletal: No peripheral edema.  Neuro Exam: Speech is fluent. Moves extremities.    Imaging:  === 02/15/24 ===    US RENAL COMPLETE    - Impression -  Mildly increased bilateral parenchymal echogenicity of the kidneys  which may indicate medical renal disease.    No significant hydronephrosis.    Signed by: Nicolas Ferreira 2/16/2024 4:02 PM  Dictation workstation:   [id number]       Blood Labs:  No results found for this or any previous visit (from the past 24 hours).   Lab Results   Component Value Date    .5 (H) 12/31/2024    PHOS 3.8 09/03/2022      Lab Results   Component Value Date    GLUCOSE 79 12/31/2024    CALCIUM 9.1 12/31/2024     12/31/2024    K 4.5 12/31/2024    CO2 30 12/31/2024     12/31/2024    BUN 35 (H) 12/31/2024    CREATININE 1.45 (H) 12/31/2024         Assessment and Plan:  1- CKD III: Last Cr level is 1.45.  Stable kidney function. Normal potassium and bicarb level.      2.  Hypertension.  Blood pressure is under control.  Continue the current medications.     3.  Secondary hyperparathyroidism.  On calcitriol with good PTH level.     I will see her in about 4 month for follow-up.          Benito Castillo MD  Senior Attending Physician  Director of Onco-Nephrology Program  Division of Nephrology & Hypertension  [facility]

## 2025-01-28 ENCOUNTER — HOSPITAL ENCOUNTER (OUTPATIENT)
Dept: CARDIOLOGY | Facility: HOSPITAL | Age: 70
Discharge: HOME | End: 2025-01-28
Payer: MEDICARE

## 2025-01-28 DIAGNOSIS — I44.2 ATRIOVENTRICULAR BLOCK, COMPLETE (MULTI): ICD-10-CM

## 2025-01-28 DIAGNOSIS — Z95.0 PACEMAKER: ICD-10-CM

## 2025-01-28 PROCEDURE — 93296 REM INTERROG EVL PM/IDS: CPT

## 2025-01-29 ENCOUNTER — TELEPHONE (OUTPATIENT)
Facility: CLINIC | Age: 70
End: 2025-01-29
Payer: MEDICARE

## 2025-01-29 DIAGNOSIS — G47.33 OSA (OBSTRUCTIVE SLEEP APNEA): Primary | ICD-10-CM

## 2025-01-29 NOTE — TELEPHONE ENCOUNTER
You have Cpap dx as sarcoidosis. Needs to be HUGH. Can you please correct and fax back.    I put a new order , please print it and fax it

## 2025-02-26 DIAGNOSIS — I50.30 DIASTOLIC HEART FAILURE, STAGE C: ICD-10-CM

## 2025-02-26 RX ORDER — CARVEDILOL 25 MG/1
25 TABLET ORAL 2 TIMES DAILY
Qty: 60 TABLET | Refills: 1 | Status: SHIPPED | OUTPATIENT
Start: 2025-02-26

## 2025-02-26 RX ORDER — LOSARTAN POTASSIUM 50 MG/1
50 TABLET ORAL DAILY
Qty: 30 TABLET | Refills: 1 | Status: SHIPPED | OUTPATIENT
Start: 2025-02-26 | End: 2025-04-27

## 2025-03-13 ENCOUNTER — TELEPHONE (OUTPATIENT)
Facility: CLINIC | Age: 70
End: 2025-03-13
Payer: MEDICARE

## 2025-03-13 NOTE — TELEPHONE ENCOUNTER
Patient called in asking to be fitted I explained that this is done through a DME company. That's when she said I am sorry I forgot I needed to call the other company.

## 2025-03-31 NOTE — PROGRESS NOTES
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 I have independently interviewed and examined the patient in the office and reviewed available records.    Physician HPI (5/1/2025):  A 69F PMH cardiac sarcoidosis, HTN, HFpEF, AV block s/p PPM, PVD comes for follow up for cardiac sarcoidosis  She was diagnosed with cardiac sarcoidosis in 2023 she is currently maintained on methotrexate 20 mg , off prednisone  She was hospitalized at [facility] in [place] for ADHF, In Feb she got sick when she was visiting her daughter in [place] she was tested positive for flu  She complains of shortness of breath with exertion and winded going up stairs.  She denies any chest wheezes or chest pain.  She complains of dry and itchy skin of the front of the tibia and also ankle edema she denies any cough or expectoration of phlegm.  She was diagnosed with severe  HUGH and she received the cpap machine, she has been using it for the last 2 weeks.  He weight has been stable after steroid discontinuation.  She is due to cardiac PET but was not done due to insurance disapproval.    Documentation of initial encounter in Epic:  She was initially referred to the ILD clinic and sarcoid clinic for cardiac sarcoidosis.  Patient was seen by Dr Mendoza on 7/2024  She complains of exertional SOB that has been increased during the last few month, she also complains of cough but no expectoration   She denies any chest wheezes or chest pain.  She had a CT chest that was positive for calcified lymphadenopathy and nonspecific scarring.  she underwent EBUS positive histopathology for non caseating granuloma.  She was prescribed steroid  30 mg daily with slow tapering, bactrum and albuterol prn  She had past history of heart block underwent pacemaker     Cardiac PET 1/2024 increased metabolic activity throughout the left ventricular wall except within the apical  inferior and inferoseptal wall, compatible with an inflammatory  process such as myocarditis or sarcoidosis    Immunization History:  Immunization History   Administered Date(s) Administered    Flu vaccine (IIV4), preservative free *Check age/dose* 10/09/2017, 10/10/2018, 10/12/2020    Flu vaccine, quadrivalent, high-dose, preservative free, age 65y+ (FLUZONE) 11/10/2023    Flu vaccine, quadrivalent, no egg protein, age 6 month or greater (FLUCELVAX) 11/15/2021    Influenza, Seasonal, Quadrivalent, Adjuvanted 09/30/2022    Influenza, injectable, MDCK, quadrivalent 10/21/2019    Influenza, seasonal, injectable 10/24/2016    Pfizer COVID-19 vaccine, bivalent, age 12 years and older (30 mcg/0.3 mL) 10/20/2022    Pfizer Purple Cap SARS-CoV-2 04/05/2021, 04/30/2021, 12/01/2021    Pneumococcal conjugate vaccine, 20-valent (PREVNAR 20) 12/05/2023    Tdap vaccine, age 7 year and older (BOOSTRIX, ADACEL) 11/15/2022       Family History:  Family History   Problem Relation Name Age of Onset    Heart disease Mother      Breast cancer Mother      Cancer Mother      Macular degeneration Mother      Cancer Father      Breast cancer Sister  36    Cancer Brother      Cancer Other Multiple Family Member        Social History:  Social History     Socioeconomic History    Marital status:    Tobacco Use    Smoking status: Never     Passive exposure: Never    Smokeless tobacco: Never   Vaping Use    Vaping status: Never Used   Substance and Sexual Activity    Alcohol use: Not Currently     Comment: rare    Drug use: Never    Sexual activity: Not Currently     Social Drivers of Health     Financial Resource Strain: Low Risk  (2/27/2025)    Received from [facility]    Overall Financial Resource Strain (CARDIA)     Difficulty of Paying Living Expenses: Not hard at all   Food Insecurity: No Food Insecurity (2/26/2025)    Received from [facility]    Hunger Vital Sign     Worried About Running Out of Food in the Last Year: Never true     Ran Out  of Food in the Last Year: Never true   Transportation Needs: No Transportation Needs (2/27/2025)    Received from [facility]    PRAPARE - Transportation     In the past 12 months, has lack of transportation kept you from medical appointments or from getting medications?: No     In the past 12 months, has lack of transportation kept you from meetings, work, or from getting things needed for daily living?: No   Intimate Partner Violence: Not At Risk (2/26/2025)    Received from [facility]    Humiliation, Afraid, Rape, and Kick questionnaire     Fear of Current or Ex-Partner: No     Emotionally Abused: No     Physically Abused: No     Sexually Abused: No   Housing Stability: Low Risk  (2/27/2025)    Received from [facility]    Housing Stability Vital Sign     Unable to Pay for Housing in the Last Year: No     Number of Times Moved in the Last Year: 0     Homeless in the Last Year: No       Current Medications:  Current Outpatient Medications   Medication Instructions    albuterol (ProAir HFA) 90 mcg/actuation inhaler 2 puffs, inhalation, Every 4 hours PRN    albuterol 90 mcg/actuation inhaler 2 puffs, inhalation, Every 6 hours PRN    amLODIPine (NORVASC) 5 mg, oral, Daily, Take at 9 AM    budesonide-formoterol (Symbicort) 80-4.5 mcg/actuation inhaler 2 puffs, inhalation, 2 times daily RT, Rinse mouth with water after use to reduce aftertaste and incidence of candidiasis. Do not swallow.    calcitriol (ROCALTROL) 0.25 mcg, oral, Every other day    carvedilol (COREG) 25 mg, oral, 2 times daily    lisinopril 10 mg, Daily    melatonin 10 mg tablet 1 tablet, Daily    methotrexate (TREXALL) 15 mg, oral, Weekly, Follow directions carefully, and ask to explain any part you do not understand. Take exactly as directed.    multivitamin-min-FA-ginkgo (One Daily Women 50 Plus) 400-120 mcg-mg tablet 1 tablet, Daily    prednisoLONE (STERANE) 5 mg,  "oral, Daily    simvastatin (ZOCOR) 20 mg, oral, Daily    torsemide (DEMADEX) 20 mg, oral, Daily    vit C/E/zinc/lutein/zeaxanthin (OCUVITE EYE HEALTH ORAL) 1 tablet, Daily        Drug Allergies/Intolerances:  Allergies   Allergen Reactions    Aspirin Unknown     Nose bleed   Per pt. Request         Review of Systems:  Review of Systems     All other review of systems are negative and/or non-contributory.    Physical Examination:  BP 88/50   Pulse 80   Temp 36.5 °C (97.7 °F)   Resp 20   Ht 1.651 m (5' 5\")   Wt 109 kg (241 lb)   SpO2 97%   BMI 40.10 kg/m²      General: Overweight   HEENT: moon face normocephalic; anicteric sclerae; conjunctivae not injected; nasal mucosa was unremarkable; oropharynx was clear without evidence of thrush; dentition was good.  Neck: supple; no lymphadenopathy or thyromegaly.  Chest: clear to auscultation bilaterally; no chest wall deformity.  Cardiac: regular rhythm; no gallop or murmur.  Abdomen: soft; non-tender; non-distended; no hepatosplenomegaly.  Extremities: LL edema and varicose veins, hyperemia of the front of the legs  Psychiatric: did not appear depressed or anxious.    Pulmonary Function Test Results        Complete Pulmonary Function Test Pre/Post Bronchodialator (Spirometry Pre/Post/DLCO/Lung Volumes)  Status: Final result     Study Result   10/11/2024    Narrative & Impression   Spirometry indicates mild airflow obstruction. There is no significant bronchodilator response. Lung volumes are consistent with air trapping. The DLCO corrected for hemoglobin is mildly reduced.     Scans on Order [id number]      Pulmonary Function Test - Scan on 10/11/2024 10:57 AM                        Pulmonary Function Test - Scan on 10/13/2024  1:56 PM                        Result History    Complete Pulmonary Function Test Pre/Post Bronchodialator (Spirometry Pre/Post/DLCO/Lung Volumes) (Order #[id number]) on 10/13/2024 - Order Result History Report - Result Edited      Complete " Pulmonary Function Test Pre/Post Bronchodialator (Spirometry Pre/Post/DLCO/Lung Volumes): Patient Communication     Add Comments   Add Notifications      Signed by    Signed Time Phone Pager   Kimmie Theodore MD 10/13/2024 13:56 [phone]     Exam Information    Status Exam Begun Exam Ended   Final 10/11/2024 10:10 10/11/2024 11:21     External Results Report    Open External Results Report    Encounter    View Encounter             Recipient List for Orders      No recipients found.                  Chest Radiograph     XR chest 2 view 09/02/2022    Narrative  MRN: [id number]  Patient Name: MALKA COKER    STUDY:  TH CHEST 2 VIEW PA AND LAT;    INDICATION:  S/P pacer .    COMPARISON:  09/01/2022 and 08/30/2022    ACCESSION NUMBER(S):  [id number]    ORDERING CLINICIAN:  KEI VILLANUEVA    FINDINGS:  Left subclavian AICD/pacemaker device noted.  The cardiac silhouette size is within normal limits.  Persistent infiltrates in the right upper and bilateral lower lung  zones.  No pneumothorax.  No acute osseous abnormality.    Impression  Persistent bilateral lung infiltrates with pneumonia not excluded.  Continued imaging follow-up is suggested.      Echocardiogram     No results found for this or any previous visit from the past 365 days.       Chest CT Scan     CT chest wo IV contrast   3/22/2024  Status: Final result     PACS Images     Show images for CT chest wo IV contrast  Signed by    Signed Time Phone Pager   Jono Ernst MD 3/23/2024 13:01 [phone]     Exam Information    Status Exam Begun Exam Ended   Final 3/22/2024 10:55 3/22/2024 11:11     Study Result    Narrative & Impression   Interpreted By:  Jono Ernst and Marshall Colin   STUDY:  CT CHEST WO IV CONTRAST;  3/22/2024 11:11 am      INDICATION:  Signs/Symptoms:sarcoid.      COMPARISON:  PET-CT dated 01/18/2024.      ACCESSION NUMBER(S):  [id number]      ORDERING CLINICIAN:  DOMI LOPEZ      TECHNIQUE:  Helical  data acquisition of the chest was obtained  without IV  contrast material.  Images were reformatted in axial, coronal, and  sagittal planes.      FINDINGS:  LUNGS AND AIRWAYS:  The trachea and central airways are patent. No endobronchial lesion.      There is mosaic attenuation of the bilateral lungs, likely relating  to small airway disease. There are reticular and bandlike opacities  scattered throughout the bilateral lungs likely reflecting a  combination of subsegmental atelectasis and/or fibrosis. There is  biapical pleuroparenchymal scarring.      There scattered areas of tree-in-bud nodularity predominantly  visualized within the right middle lobe and right upper lobe as  annotated in PACS. No focal consolidation, pleural effusion, or  pneumothorax. No discrete suspicious solid pulmonary nodules.      MEDIASTINUM AND NEVAEH, LOWER NECK AND AXILLA:  The visualized thyroid gland is within normal limits.      There are numerous calcified mediastinal and hilar lymph nodes likely  reflecting sequela of prior granulomatous infection. No thoracic  lymphadenopathy by CT size criteria.      Unchanged small hiatal hernia. The esophagus is otherwise  unremarkable in appearance.      HEART AND VESSELS:  The thoracic aorta is of normal course and caliber with mild vascular  calcifications.      Main pulmonary artery and its branches are normal in caliber.      No coronary artery calcifications are seen. The study is not  optimized for evaluation of coronary arteries.      The cardiac chambers are not enlarged. Stable positioning of a left  chest wall AICD/pacemaker with leads terminating in the right atrium  and right ventricle. Coarse mitral annular calcifications are noted.      No evidence of pericardial effusion.      UPPER ABDOMEN:  The visualized subdiaphragmatic structures demonstrate no remarkable  findings.      CHEST WALL AND OSSEOUS STRUCTURES:  The chest wall soft tissues are unremarkable. No acute  osseous  abnormalities or suspicious osseous lesions.      IMPRESSION:  1. Scattered areas of tree-in-bud nodularity predominantly visualized  within the right middle lobe and right upper lobe. Findings are  compatible with an infectious/inflammatory bronchiolitis.  2. Mosaic attenuation of the bilateral lungs likely reflects sequela  of small airway disease.  3. Calcified mediastinal and hilar lymph nodes compatible with  sequela of prior granulomatous infection.  4. Unchanged small hiatal hernia.      I personally reviewed the images/study and I agree with the resident  findings as stated. This study was interpreted at [place], [place].      MACRO:  None      Signed by: Jono Ernst 3/23/2024 1:01 PM  Dictation workstation:   [id number]          Co-morbidities Problem List    Assessment and Plan / Recommendations:  Problem List Items Addressed This Visit       Stage C diastolic heart failure    Relevant Medications    simvastatin (Zocor) 20 mg tablet    Cardiac sarcoidosis - Primary    Relevant Medications    albuterol (ProAir HFA) 90 mcg/actuation inhaler    budesonide-formoterol (Symbicort) 80-4.5 mcg/actuation inhaler    simvastatin (Zocor) 20 mg tablet    Effect of immunosuppressant therapy     Intrathoracic Sarcoidosis:  Lymphadenopathy  CT chest calcified lymphadenopathy and nonspecific scarring.    EBUS: positive for non necrotizing granuloma  6 min walk test : no desaturation  PFT obstructive   Cw albuterol inhalers     Cardiac sarcoid :  PET 1/2024  PET increased metabolic activity throughout the left ventricular wall except within the apical inferior and inferoseptal wall, compatible with an inflammatory process such as myocarditis or sarcoidosis.  Patient on tapering steroid , continue with active tapering given steroid side effects  Currently on Methotrexate 20 mg / week  Follow up CBC and CMP: shows anemia , methotrexate dose adjusted to 15 mg weekly  instead of 20 mg weekly  Follow up cardiac PET to assess the response on immunosuppressant and to guide the decision of escalation vs deescalation or (switch to another immunosuppressant as remicade)   patient had side effects of steroids that needs to be further weaned/discontinued     CKD:  Follow up Kidney function        HUGH:  Positive sleep study  Cpap machine ordered     Assessment of systemic involvement:  Annual eye exam  Liver function follow up  Follow up cardiac PET    Complex diagnosis of cardiac sarcoid and manifestation of active disease requires, on immunosupressant treatment and follow up lab /3 month,   Follow up cardiac PET to assess the response on immunosuppressant and to guide the decision of escalation (switch to another immunosuppressant as remicade) versus de escalation of immunosuppressant,    Ana Aguila MD  04/30/2025

## 2025-04-02 ENCOUNTER — APPOINTMENT (OUTPATIENT)
Facility: CLINIC | Age: 70
End: 2025-04-02
Payer: MEDICARE

## 2025-04-04 ENCOUNTER — APPOINTMENT (OUTPATIENT)
Facility: CLINIC | Age: 70
End: 2025-04-04
Payer: COMMERCIAL

## 2025-04-07 ENCOUNTER — HOSPITAL ENCOUNTER (OUTPATIENT)
Dept: RADIOLOGY | Facility: HOSPITAL | Age: 70
Discharge: HOME | End: 2025-04-07
Payer: MEDICARE

## 2025-04-07 DIAGNOSIS — R06.9 UNSPECIFIED ABNORMALITIES OF BREATHING: ICD-10-CM

## 2025-04-07 PROCEDURE — 71046 X-RAY EXAM CHEST 2 VIEWS: CPT | Performed by: RADIOLOGY

## 2025-04-07 PROCEDURE — 71046 X-RAY EXAM CHEST 2 VIEWS: CPT

## 2025-04-08 ENCOUNTER — APPOINTMENT (OUTPATIENT)
Dept: CARDIOLOGY | Facility: CLINIC | Age: 70
End: 2025-04-08
Payer: MEDICARE

## 2025-04-08 VITALS
HEIGHT: 65 IN | HEART RATE: 98 BPM | BODY MASS INDEX: 40.48 KG/M2 | SYSTOLIC BLOOD PRESSURE: 104 MMHG | OXYGEN SATURATION: 98 % | DIASTOLIC BLOOD PRESSURE: 68 MMHG | WEIGHT: 243 LBS

## 2025-04-08 DIAGNOSIS — I10 ESSENTIAL HYPERTENSION: ICD-10-CM

## 2025-04-08 DIAGNOSIS — I50.30 STAGE C DIASTOLIC HEART FAILURE: Primary | ICD-10-CM

## 2025-04-08 DIAGNOSIS — D86.85 CARDIAC SARCOIDOSIS: ICD-10-CM

## 2025-04-08 PROCEDURE — 1123F ACP DISCUSS/DSCN MKR DOCD: CPT | Performed by: INTERNAL MEDICINE

## 2025-04-08 PROCEDURE — 1159F MED LIST DOCD IN RCRD: CPT | Performed by: INTERNAL MEDICINE

## 2025-04-08 PROCEDURE — 3008F BODY MASS INDEX DOCD: CPT | Performed by: INTERNAL MEDICINE

## 2025-04-08 PROCEDURE — 1036F TOBACCO NON-USER: CPT | Performed by: INTERNAL MEDICINE

## 2025-04-08 PROCEDURE — 3074F SYST BP LT 130 MM HG: CPT | Performed by: INTERNAL MEDICINE

## 2025-04-08 PROCEDURE — 1160F RVW MEDS BY RX/DR IN RCRD: CPT | Performed by: INTERNAL MEDICINE

## 2025-04-08 PROCEDURE — 99214 OFFICE O/P EST MOD 30 MIN: CPT | Performed by: INTERNAL MEDICINE

## 2025-04-08 PROCEDURE — 3078F DIAST BP <80 MM HG: CPT | Performed by: INTERNAL MEDICINE

## 2025-04-08 PROCEDURE — 1157F ADVNC CARE PLAN IN RCRD: CPT | Performed by: INTERNAL MEDICINE

## 2025-04-08 RX ORDER — LISINOPRIL 10 MG/1
10 TABLET ORAL DAILY
COMMUNITY

## 2025-04-08 NOTE — PATIENT INSTRUCTIONS
It was a pleasure seeing you today. Please contact myself or my team with any questions.     To reach Dr. Geiger' office please call [phone] ([place]).   Fax: [phone]   To schedule an appointment call [phone]     If you have any questions or need cardiac medication refills, please call the Heart Failure office at [phone], option [phone]. You may also contact the  Heart Failure Nursing team via email at [email or web address] (Please include your name and date of birth).        1) Continue your current medications  2) If the cough worsens, we can consider going back to losartan  3) Follow up in 6 months at /[facility]

## 2025-04-08 NOTE — PROGRESS NOTES
[facility] Advanced Heart Failure Clinic  Primary Care Physician: Kaye Grey MD  Referring Provider/Cardiologist: Vijay     Date of Visit: 2025  4:00 PM EDT  Location of visit: [address]     HPI:   Ms. Buenrostro is a 69F with a PMHx sig for essential HTN, stage C diastolic HF/HFpEF, high grade AV block s/p ppm (22), cardiac/pulmonary sarcoidosis, obesity, and suspected sleep apnea who returns to the Advanced Heart Failure clinic for ongoing evaluation and management.       Interval Hx:   She was hospitalized at [facility] in [place] for ADHF. Meds at that time were adjusted including an increase in carvedilol and transition from losartan back to lisinopril.     She currently denies chest pain, palpitations, shortness of breath, orthopnea, PND. She denies headaches, dizziness or recent falls.        Hospitalizations: ADHF 2025 - 2025 at [facility]   Medication adherence: Reports adherence       PM/SHx:   Essential HTN, stage C diastolic HF/HFpEF, high grade AV block s/p ppm (22), cardiac/pulmonary sarcoidosis, obesity, suspected sleep apnea    SocHx:   Denies smoking, ETOH, illicits  lives alone in [place]     FamHx:   Mother  of CHF at age 92      Current Outpatient Medications   Medication Sig Dispense Refill    albuterol 90 mcg/actuation inhaler Inhale 2 puffs every 6 hours if needed for wheezing. 18 g 11    amLODIPine (Norvasc) 5 mg tablet TAKE ONE TABLET BY MOUTH DAILY AT 9AM 90 tablet 1    calcitriol (Rocaltrol) 0.25 mcg capsule Take 1 capsule (0.25 mcg) by mouth every other day. 45 capsule 3    carvedilol (Coreg) 25 mg tablet Take 1 tablet (25 mg) by mouth 2 times a day. 60 tablet 1    losartan (Cozaar) 50 mg tablet Take 1 tablet (50 mg) by mouth once daily. 30 tablet 1    melatonin 10 mg tablet Take 1 tablet (10 mg) by mouth once daily.      methotrexate (Trexall) 2.5 mg tablet 8 tablets (20 mg total) 1 (one) time per week.      multivitamin-min-FA-ginkgo  "(One Daily Women 50 Plus) 400-120 mcg-mg tablet Take 1 tablet by mouth once daily.      prednisoLONE (Sterane) 5 mg tablet tablet Take 1 tablet (5 mg) by mouth once daily. 90 tablet 3    simvastatin (Zocor) 20 mg tablet Take 1 tablet (20 mg) by mouth once daily. 30 tablet 1    torsemide (Demadex) 20 mg tablet Take 1 tablet (20 mg) by mouth once daily. 30 tablet 1    vit C/E/zinc/lutein/zeaxanthin (OCUVITE EYE HEALTH ORAL) Take 1 tablet by mouth once daily.       No current facility-administered medications for this visit.       Allergies   Allergen Reactions    Aspirin Unknown     Nose bleed   Per pt. Request        Visit Vitals  /68 (BP Location: Right arm, Patient Position: Sitting)   Pulse 98   Ht 1.651 m (5' 5\")   Wt 110 kg (243 lb)   SpO2 98%   BMI 40.44 kg/m²   OB Status Postmenopausal   Smoking Status Never   BSA 2.25 m²        Physical Exam:  On exam Ms. Buenrostro appears her stated age, is alert and oriented x3, and in no acute distress. Her sclera are anicteric and her oropharynx has moist mucous membranes. Her neck is supple and without thyromegaly. The JVP is ~7 cm of water above the right atrium. Her cardiac exam has regular rhythm, normal S1, S2. No S3/4. There are no murmurs with provocation. Her lungs are clear to auscultation bilaterally and there is no dullness to percussion. Her abdomen is soft, nontender with normoactive bowel sounds. There is no HJR. The extremities are warm and with trace pitting edema. There are vericose veins. The skin is dry. There is no rash present. The distal pulses are 2+ in all four extremities. Her mood and affect are appropriate for todays encounter.       Cardiac Labs/Diagnostics:    Lab Results   Component Value Date    CREATININE 1.45 (H) 12/31/2024    BUN 35 (H) 12/31/2024     12/31/2024    K 4.5 12/31/2024     12/31/2024    CO2 30 12/31/2024        Recent Labs     02/08/24  0721 12/13/22  0807 09/27/22  0805 08/30/22  1455   CHOL 138 151 212* 178 "   LDLF  --  70 116* 84   LDLCALC 63  --   --   --    HDL 61.4 64.3 72.0 73.0   TRIG 70 82 120 107       Recent Labs     08/30/22  1454   *       Echo (2/27/25; [facility]):  * Left ventricular systolic function is normal with an ejection fraction 55%.   * There is moderate concentric left ventricular hypertrophy.   * Normal right ventricular systolic function.   * Normal biatrial size.   * No significant valvular dysfunction.   * No prior study is available for comparison.     PET (1/18/24):  2. Assuming appropriate fasting there is increased metabolic activity throughout the left ventricular wall except within the apical inferior and inferoseptal wall, compatible with an inflammatory process such as myocarditis or sarcoidosis.  3. Normal perfusion of the left ventricular myocardium, without evidence of fibrotic changes.  4. Markedly dilated left ventricular chamber with normal left ventricular motion and a left ventricular ejection fraction of 50%.  5. No evidence of hypermetabolic systemic sarcoidosis. However there is multiple mediastinal and bilateral hilar calcified lymph nodes without FDG uptake indicating chronic granulomatous disease.  6. Hypermetabolic activity seen at distal esophagus/gastroesophageal junction, correlated with minimal wall thickening. Further correlation with upper endoscopy is of value    Echo (6/20/23):  1. Left ventricular systolic function is normal with a 65-70% estimated ejection fraction.  2. Spectral Doppler shows an impaired relaxation pattern of left ventricular diastolic filling.  3. Mild mitral valve regurgitation.  4. RVSP within normal limits.  5. There is no evidence of a patent foramen ovale.    ECG (4/4/23):  A-V paced (HR 70)    cMRI (2/14/23):  1. Dilated left ventricle with moderately depressed systolic function. Ejection fraction 43%.  2. Delayed enhancement imaging reveals diffuse hyperenhancement of the basal anterior septum. Focal confluent hyperenhancement of the  mid anterior ventricular junction 1.3 cm. Findings are consistent with hypertrophic cardiomyopathy. Also consider cardiac sarcoid.    Cardiac cath (1/10/23):  Mild CAD    Echo (8/31/22):  1. Left ventricular systolic function is normal.  2. Spectral Doppler shows an impaired relaxation pattern of left ventricular diastolic filling.  3. There is moderate to severe concentric left ventricular hypertrophy.  4. Aortic valve appears abnormal.  5. Moderate aortic valve stenosis.  6. There is moderate aortic valve cusp calcification.  7. Moderate LVH with normal EF.  8. Moderate aortic stenosis PG 31/17 mmHg.  9. Diastolic dysfunction with moderate tricuspid regurgitation and dilated atria.      Impression/Plan:  Ms. Buenrostro is a 69F with a PMHx sig for essential HTN, stage C diastolic HF/HFpEF, high grade AV block s/p ppm (9/1/22), cardiac/pulmonary sarcoidosis, obesity, and suspected sleep apnea who returns to the Advanced Heart Failure clinic for ongoing evaluation and management. At the current time she has functional class II symptoms and is euvolemic on exam.     1) Sarcoidosis  Discussed at HCM board. Case felt to be cardiac/pulmonary sarcoidosis. Currently on prednisone and methotrexate. A request for a repeat PET scan was denied by her insurance company.   -f/u with pulmonary re: sarcoidosis    2) Stage C chronic diastolic HF/HFpEF (LVEF 55%; 2/2025)  Tolerating her current medications. Transitioned back to lisinopril from losartan during her stay at [facility]. Previously ACEI switched to ARB with concern for a cough.   -c/w lisinopril 10 mg daily, torsemide 10 mg daily    3) HTN  BP stable.  -c/w amlodipine 5 mg daily, carvedilol 25 mg bid, lisinopril 10 mg daily      F/U: 6 months at /[facility]    Dr. Linares,  Thank you for referring Ms. Buenrostro to the  Advanced Heart Failure Clinic. Please let me know if you have any questions.      ____________________________________________________________  Charly Geiger  DO  Section of Advanced Heart Failure and Cardiac Transplantation  Division of Cardiovascular Medicine  [facility]  [facility]

## 2025-04-11 ENCOUNTER — APPOINTMENT (OUTPATIENT)
Facility: CLINIC | Age: 70
End: 2025-04-11
Payer: MEDICARE

## 2025-04-15 ENCOUNTER — APPOINTMENT (OUTPATIENT)
Dept: CARDIOLOGY | Facility: CLINIC | Age: 70
End: 2025-04-15
Payer: MEDICARE

## 2025-04-30 ENCOUNTER — APPOINTMENT (OUTPATIENT)
Dept: OPHTHALMOLOGY | Facility: CLINIC | Age: 70
End: 2025-04-30
Payer: COMMERCIAL

## 2025-04-30 ENCOUNTER — OFFICE VISIT (OUTPATIENT)
Facility: CLINIC | Age: 70
End: 2025-04-30
Payer: MEDICARE

## 2025-04-30 VITALS
BODY MASS INDEX: 40.15 KG/M2 | TEMPERATURE: 97.7 F | DIASTOLIC BLOOD PRESSURE: 50 MMHG | RESPIRATION RATE: 20 BRPM | HEART RATE: 80 BPM | OXYGEN SATURATION: 97 % | SYSTOLIC BLOOD PRESSURE: 88 MMHG | WEIGHT: 241 LBS | HEIGHT: 65 IN

## 2025-04-30 DIAGNOSIS — D86.9 SARCOIDOSIS: Primary | ICD-10-CM

## 2025-04-30 DIAGNOSIS — H35.342 LAMELLAR MACULAR HOLE OF LEFT EYE: ICD-10-CM

## 2025-04-30 DIAGNOSIS — D71: ICD-10-CM

## 2025-04-30 DIAGNOSIS — D86.9 SARCOID: ICD-10-CM

## 2025-04-30 DIAGNOSIS — I50.30 STAGE C DIASTOLIC HEART FAILURE: ICD-10-CM

## 2025-04-30 DIAGNOSIS — T45.1X5D EFFECT OF IMMUNOSUPPRESSANT THERAPY, SUBSEQUENT ENCOUNTER: ICD-10-CM

## 2025-04-30 DIAGNOSIS — D86.9 SARCOIDOSIS: ICD-10-CM

## 2025-04-30 DIAGNOSIS — H35.61 RETINAL HEMORRHAGE NOTED ON EXAMINATION OF RIGHT EYE: Primary | ICD-10-CM

## 2025-04-30 DIAGNOSIS — R73.03 PRE-DIABETES: ICD-10-CM

## 2025-04-30 DIAGNOSIS — D86.85 CARDIAC SARCOIDOSIS: ICD-10-CM

## 2025-04-30 PROCEDURE — 1159F MED LIST DOCD IN RCRD: CPT | Performed by: INTERNAL MEDICINE

## 2025-04-30 PROCEDURE — 1123F ACP DISCUSS/DSCN MKR DOCD: CPT | Performed by: INTERNAL MEDICINE

## 2025-04-30 PROCEDURE — 99213 OFFICE O/P EST LOW 20 MIN: CPT | Performed by: OPHTHALMOLOGY

## 2025-04-30 PROCEDURE — 3078F DIAST BP <80 MM HG: CPT | Performed by: INTERNAL MEDICINE

## 2025-04-30 PROCEDURE — 92134 CPTRZ OPH DX IMG PST SGM RTA: CPT | Performed by: OPHTHALMOLOGY

## 2025-04-30 PROCEDURE — G2211 COMPLEX E/M VISIT ADD ON: HCPCS | Performed by: INTERNAL MEDICINE

## 2025-04-30 PROCEDURE — 3008F BODY MASS INDEX DOCD: CPT | Performed by: INTERNAL MEDICINE

## 2025-04-30 PROCEDURE — 99215 OFFICE O/P EST HI 40 MIN: CPT | Performed by: INTERNAL MEDICINE

## 2025-04-30 PROCEDURE — 1157F ADVNC CARE PLAN IN RCRD: CPT | Performed by: INTERNAL MEDICINE

## 2025-04-30 PROCEDURE — 3074F SYST BP LT 130 MM HG: CPT | Performed by: INTERNAL MEDICINE

## 2025-04-30 RX ORDER — ALBUTEROL SULFATE 90 UG/1
2 INHALANT RESPIRATORY (INHALATION) EVERY 4 HOURS PRN
Qty: 8.5 G | Refills: 11 | Status: SHIPPED | OUTPATIENT
Start: 2025-04-30 | End: 2026-04-30

## 2025-04-30 RX ORDER — ALBUTEROL SULFATE 0.83 MG/ML
3 SOLUTION RESPIRATORY (INHALATION) ONCE
OUTPATIENT
Start: 2025-04-30 | End: 2025-04-30

## 2025-04-30 RX ORDER — ALBUTEROL SULFATE 90 UG/1
1 INHALANT RESPIRATORY (INHALATION) ONCE
OUTPATIENT
Start: 2025-04-30

## 2025-04-30 RX ORDER — BUDESONIDE AND FORMOTEROL FUMARATE DIHYDRATE 80; 4.5 UG/1; UG/1
2 AEROSOL RESPIRATORY (INHALATION)
Qty: 10.2 G | Refills: 11 | Status: SHIPPED | OUTPATIENT
Start: 2025-04-30 | End: 2026-04-30

## 2025-04-30 RX ORDER — SIMVASTATIN 20 MG/1
20 TABLET, FILM COATED ORAL DAILY
Qty: 30 TABLET | Refills: 1 | Status: SHIPPED | OUTPATIENT
Start: 2025-04-30 | End: 2025-06-29

## 2025-04-30 ASSESSMENT — COLUMBIA-SUICIDE SEVERITY RATING SCALE - C-SSRS
1. IN THE PAST MONTH, HAVE YOU WISHED YOU WERE DEAD OR WISHED YOU COULD GO TO SLEEP AND NOT WAKE UP?: NO
2. HAVE YOU ACTUALLY HAD ANY THOUGHTS OF KILLING YOURSELF?: NO
6. HAVE YOU EVER DONE ANYTHING, STARTED TO DO ANYTHING, OR PREPARED TO DO ANYTHING TO END YOUR LIFE?: NO

## 2025-04-30 ASSESSMENT — EXTERNAL EXAM - RIGHT EYE: OD_EXAM: NORMAL

## 2025-04-30 ASSESSMENT — CUP TO DISC RATIO
OS_RATIO: .3
OD_RATIO: .3

## 2025-04-30 ASSESSMENT — SLIT LAMP EXAM - LIDS
COMMENTS: GOOD POSITION
COMMENTS: GOOD POSITION

## 2025-04-30 ASSESSMENT — VISUAL ACUITY
OD_SC: 20/20
OS_SC: 20/20
METHOD: SNELLEN - LINEAR

## 2025-04-30 ASSESSMENT — TONOMETRY
OD_IOP_MMHG: 16
IOP_METHOD: GOLDMANN APPLANATION
OS_IOP_MMHG: 15

## 2025-04-30 ASSESSMENT — ENCOUNTER SYMPTOMS: EYES NEGATIVE: 1

## 2025-04-30 ASSESSMENT — EXTERNAL EXAM - LEFT EYE: OS_EXAM: NORMAL

## 2025-04-30 NOTE — PROGRESS NOTES
Assessment/Plan   Diagnoses and all orders for this visit:  Retinal hemorrhage noted on examination of right eye  -     OCT, Retina - OU - Both Eyes  Pre-diabetes  -     OCT, Retina - OU - Both Eyes  Lamellar macular hole of left eye  -     OCT, Retina - OU - Both Eyes  Granulomatous disease, chronic (Multi)  Sarcoidosis        Impression          1 H25.811 Combined forms of age-related cataract of right eye-Stable     2 H25.812 Combined forms of age-related cataract of left eye-Stable     3 H35.342 Lamellar macular hole of left eye-Stable     4 Q14.1 Congenital hypertrophy of retinal pigment epithelium-Stable     5 H35.61 Retinal hemorrhage noted on examination of right eye-Stable                   Discussion         #Pseudophakic OU   - well centered   - stable      Lamellar macular hole of left eye~H35.342   - appears stable, no role for retinal surgery at this time   -  will not preclude patient from cataract surgery      Congenital hypertrophy of retinal pigment epithelium~Q14.1   Stable, Monitor      Retinal hemorrhage noted on examination of right eye~H35.61   Single microanuersym OD     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

## 2025-05-01 DIAGNOSIS — D86.9 SARCOIDOSIS: Primary | ICD-10-CM

## 2025-05-01 LAB
ALBUMIN SERPL-MCNC: 3.8 G/DL (ref 3.6–5.1)
ALP SERPL-CCNC: 55 U/L (ref 37–153)
ALT SERPL-CCNC: 19 U/L (ref 6–29)
ANION GAP SERPL CALCULATED.4IONS-SCNC: 9 MMOL/L (CALC) (ref 7–17)
AST SERPL-CCNC: 24 U/L (ref 10–35)
BASOPHILS # BLD AUTO: 27 CELLS/UL (ref 0–200)
BASOPHILS NFR BLD AUTO: 0.4 %
BILIRUB SERPL-MCNC: 0.5 MG/DL (ref 0.2–1.2)
BUN SERPL-MCNC: 28 MG/DL (ref 7–25)
CALCIUM SERPL-MCNC: 9 MG/DL (ref 8.6–10.4)
CHLORIDE SERPL-SCNC: 105 MMOL/L (ref 98–110)
CO2 SERPL-SCNC: 23 MMOL/L (ref 20–32)
CREAT SERPL-MCNC: 1.17 MG/DL (ref 0.5–1.05)
EGFRCR SERPLBLD CKD-EPI 2021: 51 ML/MIN/1.73M2
EOSINOPHIL # BLD AUTO: 208 CELLS/UL (ref 15–500)
EOSINOPHIL NFR BLD AUTO: 3.1 %
ERYTHROCYTE [DISTWIDTH] IN BLOOD BY AUTOMATED COUNT: 15.9 % (ref 11–15)
GLUCOSE SERPL-MCNC: 97 MG/DL (ref 65–139)
HCT VFR BLD AUTO: 30.9 % (ref 35–45)
HGB BLD-MCNC: 9.6 G/DL (ref 11.7–15.5)
LYMPHOCYTES # BLD AUTO: 891 CELLS/UL (ref 850–3900)
LYMPHOCYTES NFR BLD AUTO: 13.3 %
MCH RBC QN AUTO: 29.8 PG (ref 27–33)
MCHC RBC AUTO-ENTMCNC: 31.1 G/DL (ref 32–36)
MCV RBC AUTO: 96 FL (ref 80–100)
MONOCYTES # BLD AUTO: 181 CELLS/UL (ref 200–950)
MONOCYTES NFR BLD AUTO: 2.7 %
NEUTROPHILS # BLD AUTO: 5394 CELLS/UL (ref 1500–7800)
NEUTROPHILS NFR BLD AUTO: 80.5 %
PLATELET # BLD AUTO: 295 THOUSAND/UL (ref 140–400)
PMV BLD REES-ECKER: 10.7 FL (ref 7.5–12.5)
POTASSIUM SERPL-SCNC: 4.7 MMOL/L (ref 3.5–5.3)
PROT SERPL-MCNC: 6.4 G/DL (ref 6.1–8.1)
RBC # BLD AUTO: 3.22 MILLION/UL (ref 3.8–5.1)
SODIUM SERPL-SCNC: 137 MMOL/L (ref 135–146)
WBC # BLD AUTO: 6.7 THOUSAND/UL (ref 3.8–10.8)

## 2025-05-01 RX ORDER — METHOTREXATE 2.5 MG/1
15 TABLET ORAL WEEKLY
Qty: 12 TABLET | Refills: 3 | Status: SHIPPED | OUTPATIENT
Start: 2025-05-01 | End: 2025-05-31

## 2025-05-01 NOTE — RESULT ENCOUNTER NOTE
Please call the patient and let her knows I adjusted the dose of methotrexate and put a new order  (6 tablests)/week instead of 8tab

## 2025-05-07 ENCOUNTER — HOSPITAL ENCOUNTER (OUTPATIENT)
Dept: CARDIOLOGY | Facility: HOSPITAL | Age: 70
Discharge: HOME | End: 2025-05-07
Payer: MEDICARE

## 2025-05-07 DIAGNOSIS — I44.2 ATRIOVENTRICULAR BLOCK, COMPLETE (MULTI): ICD-10-CM

## 2025-05-07 DIAGNOSIS — Z95.0 PACEMAKER: ICD-10-CM

## 2025-05-07 PROCEDURE — 93296 REM INTERROG EVL PM/IDS: CPT

## 2025-05-14 ENCOUNTER — HOSPITAL ENCOUNTER (OUTPATIENT)
Dept: RADIOLOGY | Facility: HOSPITAL | Age: 70
Discharge: HOME | End: 2025-05-14
Payer: MEDICARE

## 2025-05-14 DIAGNOSIS — D86.9 SARCOIDOSIS: ICD-10-CM

## 2025-05-14 LAB — GLUCOSE BLD MANUAL STRIP-MCNC: 89 MG/DL (ref 74–99)

## 2025-05-14 PROCEDURE — 78432 MYOCRD IMG PET 2RTRACER: CPT

## 2025-05-14 PROCEDURE — A9526 NITROGEN N-13 AMMONIA: HCPCS | Performed by: INTERNAL MEDICINE

## 2025-05-14 PROCEDURE — A9552 F18 FDG: HCPCS | Performed by: INTERNAL MEDICINE

## 2025-05-14 PROCEDURE — 82947 ASSAY GLUCOSE BLOOD QUANT: CPT

## 2025-05-14 PROCEDURE — 3430000001 HC RX 343 DIAGNOSTIC RADIOPHARMACEUTICALS: Performed by: INTERNAL MEDICINE

## 2025-05-14 RX ORDER — FLUDEOXYGLUCOSE F 18 200 MCI/ML
10.8 INJECTION, SOLUTION INTRAVENOUS
Status: COMPLETED | OUTPATIENT
Start: 2025-05-14 | End: 2025-05-14

## 2025-05-14 RX ORDER — AMMONIA N-13 37.5 MCI/ML
11.1 INJECTION INTRAVENOUS
Status: COMPLETED | OUTPATIENT
Start: 2025-05-14 | End: 2025-05-14

## 2025-05-14 RX ADMIN — FLUDEOXYGLUCOSE F 18 10.8 MILLICURIE: 200 INJECTION, SOLUTION INTRAVENOUS at 12:50

## 2025-05-14 RX ADMIN — AMMONIA N-13 11.1 MILLICURIE: 37.5 INJECTION INTRAVENOUS at 12:05

## 2025-06-10 ENCOUNTER — APPOINTMENT (OUTPATIENT)
Dept: CARDIOLOGY | Facility: CLINIC | Age: 70
End: 2025-06-10
Payer: COMMERCIAL

## 2025-06-20 DIAGNOSIS — I50.30 DIASTOLIC HEART FAILURE, STAGE C: ICD-10-CM

## 2025-06-20 RX ORDER — CARVEDILOL 25 MG/1
25 TABLET ORAL 2 TIMES DAILY
Qty: 60 TABLET | Refills: 11 | Status: SHIPPED | OUTPATIENT
Start: 2025-06-20

## 2025-06-30 ENCOUNTER — HOSPITAL ENCOUNTER (OUTPATIENT)
Dept: RESPIRATORY THERAPY | Facility: HOSPITAL | Age: 70
Discharge: HOME | End: 2025-06-30
Payer: MEDICARE

## 2025-06-30 DIAGNOSIS — D86.9 SARCOIDOSIS: ICD-10-CM

## 2025-06-30 PROCEDURE — 2500000002 HC RX 250 W HCPCS SELF ADMINISTERED DRUGS (ALT 637 FOR MEDICARE OP, ALT 636 FOR OP/ED): Performed by: INTERNAL MEDICINE

## 2025-06-30 PROCEDURE — 94060 EVALUATION OF WHEEZING: CPT | Performed by: INTERNAL MEDICINE

## 2025-06-30 PROCEDURE — 94726 PLETHYSMOGRAPHY LUNG VOLUMES: CPT | Performed by: INTERNAL MEDICINE

## 2025-06-30 PROCEDURE — 94618 PULMONARY STRESS TESTING: CPT

## 2025-06-30 PROCEDURE — 94729 DIFFUSING CAPACITY: CPT | Performed by: INTERNAL MEDICINE

## 2025-06-30 PROCEDURE — 94726 PLETHYSMOGRAPHY LUNG VOLUMES: CPT

## 2025-06-30 RX ORDER — ALBUTEROL SULFATE 90 UG/1
1 INHALANT RESPIRATORY (INHALATION) ONCE
Status: COMPLETED | OUTPATIENT
Start: 2025-06-30 | End: 2025-06-30

## 2025-06-30 RX ORDER — ALBUTEROL SULFATE 0.83 MG/ML
3 SOLUTION RESPIRATORY (INHALATION) ONCE
Status: COMPLETED | OUTPATIENT
Start: 2025-06-30 | End: 2025-06-30

## 2025-06-30 RX ADMIN — ALBUTEROL SULFATE 3 ML: 2.5 SOLUTION RESPIRATORY (INHALATION) at 11:10

## 2025-07-02 LAB
MGC ASCENT PFT - FEV1 - POST: 1.65
MGC ASCENT PFT - FEV1 - PRE: 1.58
MGC ASCENT PFT - FEV1 - PREDICTED: 2.26
MGC ASCENT PFT - FVC - POST: 2.3
MGC ASCENT PFT - FVC - PRE: 2.3
MGC ASCENT PFT - FVC - PREDICTED: 2.9

## 2025-07-14 ENCOUNTER — TELEPHONE (OUTPATIENT)
Facility: CLINIC | Age: 70
End: 2025-07-14
Payer: MEDICARE

## 2025-07-14 NOTE — TELEPHONE ENCOUNTER
LMOM for patient.    Patient is scheduled with  in Sept.     has returned sooner from medical leave and would like to offer patient sooner office visit.    Office phone number given on .

## 2025-07-15 NOTE — PROGRESS NOTES
Pulmonary Sarcoid clinic:  Follow-up for pulmonary sarcoidosis and cardiac sarcoidosis. I have independently interviewed and examined the patien and reviewed available records.    Physician HPI (7/16/2025):  A 69 F PMH cardiac sarcoidosis, HTN, HFpEF, AV block s/p PPM, PVD comes for follow up for cardiac sarcoidosis  She was diagnosed with cardiac sarcoidosis in 2023 she is currently maintained on methotrexate 15 mg , off prednisone    She complains of mild SOB which is less in severity as compared to the last year, She denies any chest wheezes or chest pain. she denies any cough or expectoration of phlegm.   She is maintained on albuterol prn    She was diagnosed with severe  HUGH and she received the cpap machine   she has been using it for the last 3 month regularly with improvement of symptoms   Her weight has been stable after steroid discontinuation.  cardiac PET 5/2025 : showed active sarcoidosis  PFT done showed mild decrease in FVC, FEV1 and air trapping    Documentation of initial encounter in Epic:  She was initially referred to the [facility] clinic and sarcoid clinic for cardiac sarcoidosis.  Patient was seen by Dr Mendoza on 7/2024  She complains of exertional SOB that has been increased during the last few month, she also complains of cough but no expectoration   She denies any chest wheezes or chest pain.  She had a CT chest that was positive for calcified lymphadenopathy and nonspecific scarring.  she underwent EBUS positive histopathology for non caseating granuloma.  She was prescribed steroid  30 mg daily with slow tapering, bactrum and albuterol prn  She had past history of heart block underwent pacemaker     Cardiac PET 1/2024 increased metabolic activity throughout the left ventricular wall except within the apical  inferior and inferoseptal wall, compatible with an inflammatory process such as myocarditis or sarcoidosis  Immunization History:  Immunization History   Administered Date(s) Administered     Flu vaccine (IIV4), preservative free *Check age/dose* 10/09/2017, 10/10/2018, 10/12/2020    Flu vaccine, quadrivalent, high-dose, preservative free, age 65y+ (FLUZONE) 11/10/2023    Flu vaccine, quadrivalent, no egg protein, age 6 month or greater (FLUCELVAX) 11/15/2021    Influenza, Seasonal, Quadrivalent, Adjuvanted 09/30/2022    Influenza, injectable, MDCK, quadrivalent 10/21/2019    Influenza, seasonal, injectable 10/24/2016    Pfizer COVID-19 vaccine, bivalent, age 12 years and older (30 mcg/0.3 mL) 10/20/2022    Pfizer Purple Cap SARS-CoV-2 04/05/2021, 04/30/2021, 12/01/2021    Pneumococcal conjugate vaccine, 20-valent (PREVNAR 20) 12/05/2023    Tdap vaccine, age 7 year and older (BOOSTRIX, ADACEL) 11/15/2022       Family History:  Family History[1]    Social History:  Social History[2]    Current Medications:  Current Outpatient Medications   Medication Instructions    albuterol (ProAir HFA) 90 mcg/actuation inhaler 2 puffs, inhalation, Every 4 hours PRN    albuterol 90 mcg/actuation inhaler 2 puffs, inhalation, Every 6 hours PRN    albuterol 90 mcg/actuation inhaler 2 puffs, inhalation, Every 6 hours PRN    amLODIPine (NORVASC) 5 mg, oral, Daily, Take at 9 AM    budesonide-formoterol (Symbicort) 80-4.5 mcg/actuation inhaler 2 puffs, inhalation, 2 times daily RT, Rinse mouth with water after use to reduce aftertaste and incidence of candidiasis. Do not swallow.    calcitriol (ROCALTROL) 0.25 mcg, oral, Every other day    carvedilol (COREG) 25 mg, oral, 2 times daily    lisinopril 10 mg, Daily    melatonin 10 mg tablet 1 tablet, Daily    methotrexate (TREXALL) 20 mg, oral, Weekly, Follow directions carefully, and ask to explain any part you do not understand. Take exactly as directed.    multivitamin-min-FA-ginkgo (One Daily Women 50 Plus) 400-120 mcg-mg tablet 1 tablet, Daily    prednisoLONE (STERANE) 5 mg, oral, Daily    simvastatin (ZOCOR) 20 mg, oral, Daily    torsemide (DEMADEX) 20 mg, oral, Daily     "vit C/E/zinc/lutein/zeaxanthin (OCUVITE EYE HEALTH ORAL) 1 tablet, Daily        Drug Allergies/Intolerances:  RX Allergies[3]     Review of Systems:  Leg swelling  All other review of systems are negative and/or non-contributory.    Physical Examination:  /74   Pulse 79   Temp 36.5 °C (97.7 °F) (Temporal)   Ht 1.651 m (5' 5\")   Wt 108 kg (237 lb)   SpO2 96%   BMI 39.44 kg/m²      General: ambulated independently;   HEENT: normocephalic; anicteric sclerae; conjunctivae not injected; nasal mucosa was unremarkable; oropharynx was clear without evidence of thrush; dentition was good.  Neck: supple; no lymphadenopathy or thyromegaly.  Chest: clear to auscultation bilaterally; no chest wall deformity.  Cardiac: regular rhythm; no gallop or murmur.  Abdomen: soft; non-tender; non-distended; no hepatosplenomegaly.  Extremities: bilateral leg swelling and redness  Psychiatric: did not appear depressed or anxious.    Pulmonary Function Test Results        Complete Pulmonary Function Test Pre/Post Bronchodilator   (Spirometry Pre/Post/DLCO/Lung Volumes)   6/30/2025  Status: Final result     Scans on Order [id number]    Pulmonary Function Test - Scan on 6/30/2025 11:25 AM            Pulmonary Function Test - Scan on 7/2/2025 10:08 AM            Result History    Complete Pulmonary Function Test Pre/Post Bronchodilator (Spirometry Pre/Post/DLCO/Lung Volumes) (Order #[id number]) on 7/2/2025 - Order Result History Report    Complete Pulmonary Function Test Pre/Post Bronchodilator (Spirometry Pre/Post/DLCO/Lung Volumes): Patient Communication     Add Comments   Add Notifications      Signed by    Signed Time Phone Pager   Kimmie Theodore MD 7/02/2025 10:08 [phone]     Exam Information    Status Exam Begun Exam Ended   Final 6/30/2025 10:30 6/30/2025 11:25     External Results Report    Open External Results Report    Encounter    View Encounter             Recipient List for Orders  Batch Status Sent From " To Cc'd Forwarded To Results   Sent 6/30/2025 11:25 AM Interface, Pft Results In Ana Aguila MD   Complete Pulmonary Function Test Pre/Post Bronchodilator (Spirometry Pre/Post/DLCO/Lung Volumes) [[id number]]            Chest Radiograph     XR chest 2 views 04/07/2025    Narrative  Interpreted By:  Tico Gillette,  STUDY:  XR CHEST 2 VIEWS;  4/7/2025 12:51 pm    INDICATION:  Signs/Symptoms:BREATHING PROBLEMS.    COMPARISON:  09/02/2022    ACCESSION NUMBER(S):  [id number]    ORDERING CLINICIAN:  MALGORZATA PETER    FINDINGS:  CHEST/LUNGS:  The cardiac and mediastinal silhouettes are unchanged in size and  configuration. Dual lead cardiac device overlies the left hemithorax  and is unchanged in position. There is coarsening of the interstitial  markings which is unchanged. Persistent opacities in the lung bases  most likely reflecting atelectasis/scarring. There are calcified  hilar and mediastinal lymph nodes. No new areas of consolidation are  noted. No effusion or pneumothorax is seen.    UPPER ABDOMEN:  No remarkable upper abdominal findings.    OSSEOUS STRUCTURES:  No acute changes.    Impression  No significant change when compared to the prior examination.    MACRO:  None.    Signed by: Tico Gillette 4/8/2025 8:09 AM  Dictation workstation:   [id number]      Echocardiogram     Echocardiogram   3/21/2023    Height: Not recorded   Weight: Not recorded   Blood Pressure: Not recorded    Date of Study: 06/21/2023   Ordering Provider: Charly Geiger DO    Clinical Indications: None Listed       Reading Physicians  Performing Staff    Talat Tavares MD     No performing staff assigned to study.     PACS Images     Show images for Echocardiogram  Interpretation Summary       [facility]  [address] [address]  Tel [phone] Fax [phone]     TRANSTHORACIC ECHOCARDIOGRAM REPORT        Patient Name:     [place]      Reading Physician:  [id number] Talat Tavares MD  [facility]  Study Date:        6/20/2023     Referring           TERENCE OLVERA  Physician:  MRN/PID:          [id number]      PCP:                [id number] Kaye Grey MD  Accession/Order#: [id number]  Department          [facility]  Location:  YOB: 1955    Fellow:  Gender:           F             Nurse:  Admit Date:                     Sonographer:        Lesly [facility]ye [facility]  Admission Status: Outpatient    Additional Staff:  Height:           165.10 cm     CC Report to:  Weight:           105.69 kg     Study Type:         Echocardiogram  BSA:              2.11 m2  Blood Pressure: 137 /83 mmHg     Diagnosis/ICD: I42.2-Other hypertrophic cardiomyopathy  Indication:    HOCM  Procedure/CPT: Echo Complete w Full [id number]     Patient History:  BMI:               Obese >30  Pacer/Defib:       AICD  Pertinent History: HTN and Hyperlipidemia. HOCM; Previous echocardiogram  08-31-22.     Study Detail: The following Echo studies were performed: 2D, M-Mode, Doppler and  color flow.        PHYSICIAN INTERPRETATION:  Left Ventricle: Left ventricular systolic function is normal, with an estimated ejection fraction of 65-70%. There are no regional wall motion abnormalities. The left ventricular cavity size is mildly dilated. The left ventricular septal wall thickness is mildly increased. There is mildly increased left ventricular posterior wall thickness. There is mild concentric left ventricular hypertrophy. Spectral Doppler shows an impaired relaxation pattern of left ventricular diastolic filling.  Left Atrium: The left atrium is mildly dilated. There is no evidence of a patent foramen ovale.  Right Ventricle: The right ventricle is normal in size. There is normal right ventricular global systolic function.  Right Atrium: The right atrium is normal in size.  Aortic Valve: The aortic valve is trileaflet. There is trivial aortic valve regurgitation. The peak instantaneous gradient of the aortic valve is 13.8 mmHg. The mean  gradient of the aortic valve is 8.0 mmHg.  Mitral Valve: The mitral valve is normal in structure. There is mild mitral valve regurgitation.  Tricuspid Valve: The tricuspid valve is structurally normal. There is trace tricuspid regurgitation. The Doppler estimated RVSP is within normal limits at 23.1 mmHg.  Pulmonic Valve: The pulmonic valve is structurally normal. There is no indication of pulmonic valve regurgitation.  Pericardium: There is no pericardial effusion noted.  Aorta: The aortic root is normal.        CONCLUSIONS:  1. Left ventricular systolic function is normal with a 65-70% estimated ejection fraction.  2. Spectral Doppler shows an impaired relaxation pattern of left ventricular diastolic filling.  3. Mild mitral valve regurgitation.  4. RVSP within normal limits.  5. There is no evidence of a patent foramen ovale.     QUANTITATIVE DATA SUMMARY:  2D MEASUREMENTS:  Normal Ranges:  LAs:           4.60 cm    (2.7-4.0cm)  IVSd:          1.34 cm    (0.6-1.1cm)  LVPWd:         1.27 cm    (0.6-1.1cm)  LVIDd:         5.82 cm    (3.9-5.9cm)  LVIDs:         3.10 cm  LV Mass Index: 158.7 g/m2  LV % FS        46.7 %     LA VOLUME:  Normal Ranges:  LA Vol A4C:        63.4 ml    (22+/-6mL/m2)  LA Vol A2C:        74.3 ml  LA Vol BP:         69.7 ml  LA Vol Index A4C:  30.0ml/m2  LA Vol Index A2C:  35.2 ml/m2  LA Vol Index BP:   33.0 ml/m2  LA Area A4C:       21.5 cm2  LA Area A2C:       22.9 cm2  LA Major Axis A4C: 6.2 cm  LA Major Axis A2C: 6.0 cm  LA Volume Index:   34.1 ml/m2  LA Vol A4C:        58.0 ml  LA Vol A2C:        72.0 ml     M-MODE MEASUREMENTS:  Normal Ranges:  Ao Root: 3.40 cm (2.0-3.7cm)     AORTA MEASUREMENTS:  Normal Ranges:  Ao Sinus, d: 3.00 cm (2.1-3.5cm)  Ao STJ, d:   2.90 cm (1.7-3.4cm)  Asc Ao, d:   3.50 cm (2.1-3.4cm)     LV SYSTOLIC FUNCTION BY 2D PLANIMETRY (MOD):  Normal Ranges:  EF-A4C View: 63.0 % (>=55%)  EF-A2C View: 58.2 %  EF-Biplane:  60.0 %     LV DIASTOLIC FUNCTION:  Normal  Ranges:  MV Peak E:        0.51 m/s    (0.7-1.2 m/s)  MV Peak A:        0.98 m/s    (0.42-0.7 m/s)  E/A Ratio:        0.52        (1.0-2.2)  MV lateral e'     0.06 m/s  MV medial e'      0.05 m/s  E/e' Ratio:       8.50        (<8.0)  PulmV Sys Rusty:    63.20 cm/s  PulmV Ervin Rusty:   40.30 cm/s  PulmV A Revs Rusty: 25.10 cm/s  PulmV A Revs Dur: 129.00 msec     MITRAL VALVE:  Normal Ranges:  MV Vmax:    0.98 m/s (<=1.3m/s)  MV peak PG: 3.8 mmHg (<5mmHg)  MV mean P.0 mmHg (<48mmHg)  MV DT:      362 msec (150-240msec)     AORTIC VALVE:  Normal Ranges:  AoV Vmax:                1.86 m/s  (<=1.7m/s)  AoV Peak P.8 mmHg (<20mmHg)  AoV Mean P.0 mmHg  (1.7-11.5mmHg)  LVOT Max Rusty:            0.95 m/s  (<=1.1m/s)  AoV VTI:                 42.60 cm  (18-25cm)  LVOT VTI:                21.60 cm  LVOT Diameter:           2.00 cm   (1.8-2.4cm)  AoV Area, VTI:           1.59 cm2  (2.5-5.5cm2)  AoV Area,Vmax:           1.60 cm2  (2.5-4.5cm2)  AoV Dimensionless Index: 0.51        RIGHT VENTRICLE:  RV 1   3.3 cm  RV 2   3.4 cm  RV 3   7.7 cm  TAPSE: 20.0 mm  RV s'  0.14 m/s     TRICUSPID VALVE/RVSP:  Normal Ranges:  Peak TR Velocity: 2.24 m/s  RV Syst Pressure: 23.1 mmHg (< 30mmHg)     PULMONIC VALVE:  Normal Ranges:  PV Accel Time: 82 msec  (>120ms)  PV Max Rusty:    1.1 m/s  (0.6-0.9m/s)  PV Max P.9 mmHg     Pulmonary Veins:  PulmV A Revs Dur: 129.00 msec  PulmV A Revs Rusty: 25.10 cm/s  PulmV Ervin Rusty:   40.30 cm/s  PulmV Sys Rusty:    63.20 cm/s        [id number] Talat Tavares MD  Electronically signed on 2023 at 4:39:58 PM              Chest CT Scan     CT chest wo IV contrast   3/22/2024  Status: Final result     PACS Images     Show images for CT chest wo IV contrast  Signed by    Signed Time Phone Pager   Jono Ernst MD 3/23/2024 13:01 [phone]     Exam Information    Status Exam Begun Exam Ended   Final 3/22/2024 10:55 3/22/2024 11:11     Study Result    Narrative &  Impression   Interpreted By:  Jono Ernst and Marshall Colin   STUDY:  CT CHEST WO IV CONTRAST;  3/22/2024 11:11 am      INDICATION:  Signs/Symptoms:sarcoid.      COMPARISON:  PET-CT dated 01/18/2024.      ACCESSION NUMBER(S):  [id number]      ORDERING CLINICIAN:  DOMI LOPEZ      TECHNIQUE:  Helical data acquisition of the chest was obtained  without IV  contrast material.  Images were reformatted in axial, coronal, and  sagittal planes.      FINDINGS:  LUNGS AND AIRWAYS:  The trachea and central airways are patent. No endobronchial lesion.      There is mosaic attenuation of the bilateral lungs, likely relating  to small airway disease. There are reticular and bandlike opacities  scattered throughout the bilateral lungs likely reflecting a  combination of subsegmental atelectasis and/or fibrosis. There is  biapical pleuroparenchymal scarring.      There scattered areas of tree-in-bud nodularity predominantly  visualized within the right middle lobe and right upper lobe as  annotated in PACS. No focal consolidation, pleural effusion, or  pneumothorax. No discrete suspicious solid pulmonary nodules.      MEDIASTINUM AND NEVAEH, LOWER NECK AND AXILLA:  The visualized thyroid gland is within normal limits.      There are numerous calcified mediastinal and hilar lymph nodes likely  reflecting sequela of prior granulomatous infection. No thoracic  lymphadenopathy by CT size criteria.      Unchanged small hiatal hernia. The esophagus is otherwise  unremarkable in appearance.      HEART AND VESSELS:  The thoracic aorta is of normal course and caliber with mild vascular  calcifications.      Main pulmonary artery and its branches are normal in caliber.      No coronary artery calcifications are seen. The study is not  optimized for evaluation of coronary arteries.      The cardiac chambers are not enlarged. Stable positioning of a left  chest wall AICD/pacemaker with leads terminating in the right atrium  and right  ventricle. Coarse mitral annular calcifications are noted.      No evidence of pericardial effusion.      UPPER ABDOMEN:  The visualized subdiaphragmatic structures demonstrate no remarkable  findings.      CHEST WALL AND OSSEOUS STRUCTURES:  The chest wall soft tissues are unremarkable. No acute osseous  abnormalities or suspicious osseous lesions.      IMPRESSION:  1. Scattered areas of tree-in-bud nodularity predominantly visualized  within the right middle lobe and right upper lobe. Findings are  compatible with an infectious/inflammatory bronchiolitis.  2. Mosaic attenuation of the bilateral lungs likely reflects sequela  of small airway disease.  3. Calcified mediastinal and hilar lymph nodes compatible with  sequela of prior granulomatous infection.  4. Unchanged small hiatal hernia.      I personally reviewed the images/study and I agree with the resident  findings as stated. This study was interpreted at [place], [place].      MACRO:  None      Signed by: Jono Ernst 3/23/2024 1:01 PM  Dictation workstation:   [id number]        Assessment and Plan / Recommendations:  Problem List Items Addressed This Visit       Pulmonary sarcoidosis (Multi)    CKD stage G3b/A2, GFR 30-44 and albumin creatinine ratio  mg/g (Multi)    Relevant Medications    methotrexate (Trexall) 2.5 mg tablet    SOB (shortness of breath)    Relevant Medications    albuterol 90 mcg/actuation inhaler    Cardiac sarcoidosis - Primary    Relevant Medications    methotrexate (Trexall) 2.5 mg tablet    Other Relevant Orders    CBC and Auto Differential    Comprehensive metabolic panel    Immunosuppressed status    HUGH (obstructive sleep apnea)           Refractory Cardiac sarcoid :  PET 5/2025  PET increased metabolic activity throughout the left ventricular wall   Currently on Methotrexate 15 mg / week increased to 20 mg daily  Restart prednisone 5 mg (could not tolerate higher dose  )  Consider switching to another immunosuppressant as remicade after discussion with Dr Malik    Intrathoracic Sarcoidosis:  Lymphadenopathy  CT chest calcified lymphadenopathy and nonspecific scarring.    EBUS: positive for non necrotizing granuloma  6 min walk test : no desaturation  PFT obstructive   Cw albuterol inhalers     CKD:  Follow up Kidney function        HUGH:  Positive sleep study  She is on CPAP for the last three month  Compliant: Using it regularly      Assessment of systemic involvement:  Annual eye exam  CBC Liver function follow up  Complex diagnosis of refractory cardiac sarcoid and manifestation of active disease on recent PET on immunosupressant treatment and follow up lab /3 month,   Consideration to escalate immunosuppressant,      Ana Aguila MD  07/16/2025         [1]   Family History  Problem Relation Name Age of Onset    Heart disease Mother      Breast cancer Mother      Cancer Mother      Macular degeneration Mother      Cancer Father      Breast cancer Sister  36    Cancer Brother      Cancer Other Multiple Family Member    [2]   Social History  Socioeconomic History    Marital status:    Tobacco Use    Smoking status: Never     Passive exposure: Never    Smokeless tobacco: Never   Vaping Use    Vaping status: Never Used   Substance and Sexual Activity    Alcohol use: Not Currently     Comment: rare    Drug use: Never    Sexual activity: Not Currently     Social Drivers of Health     Financial Resource Strain: Low Risk  (2/27/2025)    Received from [facility]    Overall Financial Resource Strain (CARDIA)     Difficulty of Paying Living Expenses: Not hard at all   Food Insecurity: No Food Insecurity (2/26/2025)    Received from [facility]    Hunger Vital Sign     Within the past 12 months, you worried that your food would run out before you got the money to buy more.: Never true     Within the past 12 months, the  food you bought just didn't last and you didn't have money to get more.: Never true   Transportation Needs: No Transportation Needs (2/27/2025)    Received from [facility]    PRAPARE - Transportation     In the past 12 months, has lack of transportation kept you from medical appointments or from getting medications?: No     In the past 12 months, has lack of transportation kept you from meetings, work, or from getting things needed for daily living?: No   Intimate Partner Violence: Not At Risk (2/26/2025)    Received from [facility]    Humiliation, Afraid, Rape, and Kick questionnaire     Within the last year, have you been afraid of your partner or ex-partner?: No     Within the last year, have you been humiliated or emotionally abused in other ways by your partner or ex-partner?: No     Within the last year, have you been kicked, hit, slapped, or otherwise physically hurt by your partner or ex-partner?: No     Within the last year, have you been raped or forced to have any kind of sexual activity by your partner or ex-partner?: No   Housing Stability: Low Risk  (2/27/2025)    Received from [facility]    Housing Stability Vital Sign     In the last 12 months, was there a time when you were not able to pay the mortgage or rent on time?: No     In the past 12 months, how many times have you moved where you were living?: 0     At any time in the past 12 months, were you homeless or living in a shelter (including now)?: No   [3]   Allergies  Allergen Reactions    Aspirin Unknown     Nose bleed   Per pt. Request

## 2025-07-16 ENCOUNTER — OFFICE VISIT (OUTPATIENT)
Facility: CLINIC | Age: 70
End: 2025-07-16
Payer: MEDICARE

## 2025-07-16 VITALS
HEART RATE: 79 BPM | HEIGHT: 65 IN | SYSTOLIC BLOOD PRESSURE: 103 MMHG | DIASTOLIC BLOOD PRESSURE: 74 MMHG | WEIGHT: 237 LBS | TEMPERATURE: 97.7 F | OXYGEN SATURATION: 96 % | BODY MASS INDEX: 39.49 KG/M2

## 2025-07-16 DIAGNOSIS — R06.02 SOB (SHORTNESS OF BREATH): ICD-10-CM

## 2025-07-16 DIAGNOSIS — D84.9 IMMUNOSUPPRESSED STATUS: ICD-10-CM

## 2025-07-16 DIAGNOSIS — N18.32 CKD STAGE G3B/A2, GFR 30-44 AND ALBUMIN CREATININE RATIO 30-299 MG/G (MULTI): ICD-10-CM

## 2025-07-16 DIAGNOSIS — D86.85 CARDIAC SARCOIDOSIS: Primary | ICD-10-CM

## 2025-07-16 DIAGNOSIS — D86.0 PULMONARY SARCOIDOSIS (MULTI): ICD-10-CM

## 2025-07-16 DIAGNOSIS — G47.33 OSA (OBSTRUCTIVE SLEEP APNEA): ICD-10-CM

## 2025-07-16 PROCEDURE — G2211 COMPLEX E/M VISIT ADD ON: HCPCS | Performed by: INTERNAL MEDICINE

## 2025-07-16 PROCEDURE — 1159F MED LIST DOCD IN RCRD: CPT | Performed by: INTERNAL MEDICINE

## 2025-07-16 PROCEDURE — 3078F DIAST BP <80 MM HG: CPT | Performed by: INTERNAL MEDICINE

## 2025-07-16 PROCEDURE — 99215 OFFICE O/P EST HI 40 MIN: CPT | Performed by: INTERNAL MEDICINE

## 2025-07-16 PROCEDURE — 1036F TOBACCO NON-USER: CPT | Performed by: INTERNAL MEDICINE

## 2025-07-16 PROCEDURE — 3008F BODY MASS INDEX DOCD: CPT | Performed by: INTERNAL MEDICINE

## 2025-07-16 PROCEDURE — 1126F AMNT PAIN NOTED NONE PRSNT: CPT | Performed by: INTERNAL MEDICINE

## 2025-07-16 PROCEDURE — 3074F SYST BP LT 130 MM HG: CPT | Performed by: INTERNAL MEDICINE

## 2025-07-16 RX ORDER — METHOTREXATE 2.5 MG/1
20 TABLET ORAL WEEKLY
Qty: 12 TABLET | Refills: 0 | Status: SHIPPED | OUTPATIENT
Start: 2025-07-16 | End: 2025-08-15

## 2025-07-16 RX ORDER — ALBUTEROL SULFATE 90 UG/1
2 INHALANT RESPIRATORY (INHALATION) EVERY 6 HOURS PRN
Qty: 18 G | Refills: 11 | Status: SHIPPED | OUTPATIENT
Start: 2025-07-16 | End: 2026-07-16

## 2025-07-16 ASSESSMENT — ENCOUNTER SYMPTOMS
LOSS OF SENSATION IN FEET: 0
OCCASIONAL FEELINGS OF UNSTEADINESS: 1
DEPRESSION: 0

## 2025-07-16 ASSESSMENT — PAIN SCALES - GENERAL: PAINLEVEL_OUTOF10: 0-NO PAIN

## 2025-07-17 LAB
ALBUMIN SERPL-MCNC: 4 G/DL (ref 3.6–5.1)
ALP SERPL-CCNC: 59 U/L (ref 37–153)
ALT SERPL-CCNC: 24 U/L (ref 6–29)
ANION GAP SERPL CALCULATED.4IONS-SCNC: 10 MMOL/L (CALC) (ref 7–17)
ANION GAP SERPL CALCULATED.4IONS-SCNC: 7 MMOL/L (CALC) (ref 7–17)
AST SERPL-CCNC: 29 U/L (ref 10–35)
BASOPHILS # BLD AUTO: 21 CELLS/UL (ref 0–200)
BASOPHILS NFR BLD AUTO: 0.3 %
BILIRUB SERPL-MCNC: 0.6 MG/DL (ref 0.2–1.2)
BUN SERPL-MCNC: 29 MG/DL (ref 7–25)
BUN SERPL-MCNC: 29 MG/DL (ref 7–25)
BUN/CREAT SERPL: 26 (CALC) (ref 6–22)
CALCIUM SERPL-MCNC: 9.1 MG/DL (ref 8.6–10.4)
CALCIUM SERPL-MCNC: 9.5 MG/DL (ref 8.6–10.4)
CHLORIDE SERPL-SCNC: 101 MMOL/L (ref 98–110)
CHLORIDE SERPL-SCNC: 103 MMOL/L (ref 98–110)
CO2 SERPL-SCNC: 26 MMOL/L (ref 20–32)
CO2 SERPL-SCNC: 27 MMOL/L (ref 20–32)
CREAT SERPL-MCNC: 1.13 MG/DL (ref 0.5–1.05)
CREAT SERPL-MCNC: 1.13 MG/DL (ref 0.5–1.05)
EGFRCR SERPLBLD CKD-EPI 2021: 53 ML/MIN/1.73M2
EGFRCR SERPLBLD CKD-EPI 2021: 53 ML/MIN/1.73M2
EOSINOPHIL # BLD AUTO: 242 CELLS/UL (ref 15–500)
EOSINOPHIL NFR BLD AUTO: 3.5 %
ERYTHROCYTE [DISTWIDTH] IN BLOOD BY AUTOMATED COUNT: 16.4 % (ref 11–15)
GLUCOSE SERPL-MCNC: 85 MG/DL (ref 65–99)
GLUCOSE SERPL-MCNC: 90 MG/DL (ref 65–99)
HCT VFR BLD AUTO: 33.7 % (ref 35–45)
HGB BLD-MCNC: 10.6 G/DL (ref 11.7–15.5)
LYMPHOCYTES # BLD AUTO: 1270 CELLS/UL (ref 850–3900)
LYMPHOCYTES NFR BLD AUTO: 18.4 %
MCH RBC QN AUTO: 30.5 PG (ref 27–33)
MCHC RBC AUTO-ENTMCNC: 31.5 G/DL (ref 32–36)
MCV RBC AUTO: 97.1 FL (ref 80–100)
MONOCYTES # BLD AUTO: 200 CELLS/UL (ref 200–950)
MONOCYTES NFR BLD AUTO: 2.9 %
NEUTROPHILS # BLD AUTO: 5168 CELLS/UL (ref 1500–7800)
NEUTROPHILS NFR BLD AUTO: 74.9 %
PLATELET # BLD AUTO: 215 THOUSAND/UL (ref 140–400)
PMV BLD REES-ECKER: 10.9 FL (ref 7.5–12.5)
POTASSIUM SERPL-SCNC: 5 MMOL/L (ref 3.5–5.3)
POTASSIUM SERPL-SCNC: 5.1 MMOL/L (ref 3.5–5.3)
PROT SERPL-MCNC: 6.5 G/DL (ref 6.1–8.1)
PTH-INTACT SERPL-MCNC: 56 PG/ML (ref 16–77)
RBC # BLD AUTO: 3.47 MILLION/UL (ref 3.8–5.1)
SODIUM SERPL-SCNC: 137 MMOL/L (ref 135–146)
SODIUM SERPL-SCNC: 137 MMOL/L (ref 135–146)
WBC # BLD AUTO: 6.9 THOUSAND/UL (ref 3.8–10.8)

## 2025-07-25 ENCOUNTER — OFFICE VISIT (OUTPATIENT)
Dept: PRIMARY CARE | Facility: CLINIC | Age: 70
End: 2025-07-25
Payer: MEDICARE

## 2025-07-25 ENCOUNTER — APPOINTMENT (OUTPATIENT)
Dept: NEPHROLOGY | Facility: CLINIC | Age: 70
End: 2025-07-25
Payer: MEDICARE

## 2025-07-25 ENCOUNTER — OFFICE VISIT (OUTPATIENT)
Dept: NEPHROLOGY | Facility: CLINIC | Age: 70
End: 2025-07-25
Payer: MEDICARE

## 2025-07-25 VITALS
SYSTOLIC BLOOD PRESSURE: 119 MMHG | WEIGHT: 234 LBS | HEIGHT: 65 IN | DIASTOLIC BLOOD PRESSURE: 78 MMHG | HEART RATE: 77 BPM | BODY MASS INDEX: 38.99 KG/M2

## 2025-07-25 VITALS
BODY MASS INDEX: 39.02 KG/M2 | DIASTOLIC BLOOD PRESSURE: 78 MMHG | HEIGHT: 65 IN | TEMPERATURE: 97.2 F | WEIGHT: 234.2 LBS | SYSTOLIC BLOOD PRESSURE: 119 MMHG | HEART RATE: 77 BPM

## 2025-07-25 DIAGNOSIS — Z13.89 SCREENING FOR OBESITY: ICD-10-CM

## 2025-07-25 DIAGNOSIS — E66.01 CLASS 2 SEVERE OBESITY DUE TO EXCESS CALORIES WITH SERIOUS COMORBIDITY AND BODY MASS INDEX (BMI) OF 38.0 TO 38.9 IN ADULT: ICD-10-CM

## 2025-07-25 DIAGNOSIS — D86.85 CARDIAC SARCOIDOSIS: ICD-10-CM

## 2025-07-25 DIAGNOSIS — Z12.11 ENCOUNTER FOR SCREENING FOR MALIGNANT NEOPLASM OF COLON: ICD-10-CM

## 2025-07-25 DIAGNOSIS — Z12.31 BREAST CANCER SCREENING BY MAMMOGRAM: ICD-10-CM

## 2025-07-25 DIAGNOSIS — E21.3 HYPERPARATHYROIDISM (MULTI): ICD-10-CM

## 2025-07-25 DIAGNOSIS — Z71.85 VACCINE COUNSELING: ICD-10-CM

## 2025-07-25 DIAGNOSIS — Z71.89 ADVANCED CARE PLANNING/COUNSELING DISCUSSION: ICD-10-CM

## 2025-07-25 DIAGNOSIS — Z13.1 SCREENING FOR DIABETES MELLITUS: ICD-10-CM

## 2025-07-25 DIAGNOSIS — Z00.00 MEDICARE ANNUAL WELLNESS VISIT, SUBSEQUENT: Primary | ICD-10-CM

## 2025-07-25 DIAGNOSIS — D64.9 ANEMIA, UNSPECIFIED TYPE: ICD-10-CM

## 2025-07-25 DIAGNOSIS — E66.812 CLASS 2 SEVERE OBESITY DUE TO EXCESS CALORIES WITH SERIOUS COMORBIDITY AND BODY MASS INDEX (BMI) OF 38.0 TO 38.9 IN ADULT: ICD-10-CM

## 2025-07-25 DIAGNOSIS — I10 HTN (HYPERTENSION), BENIGN: ICD-10-CM

## 2025-07-25 DIAGNOSIS — N18.32 CKD STAGE G3B/A2, GFR 30-44 AND ALBUMIN CREATININE RATIO 30-299 MG/G (MULTI): ICD-10-CM

## 2025-07-25 DIAGNOSIS — I10 ESSENTIAL HYPERTENSION: Primary | ICD-10-CM

## 2025-07-25 DIAGNOSIS — Z13.31 DEPRESSION SCREENING NEGATIVE: ICD-10-CM

## 2025-07-25 DIAGNOSIS — I50.30 STAGE C DIASTOLIC HEART FAILURE: ICD-10-CM

## 2025-07-25 DIAGNOSIS — Z91.81 PERSONAL HISTORY OF FALL: ICD-10-CM

## 2025-07-25 PROCEDURE — 1158F ADVNC CARE PLAN TLK DOCD: CPT | Performed by: INTERNAL MEDICINE

## 2025-07-25 PROCEDURE — 1170F FXNL STATUS ASSESSED: CPT | Performed by: INTERNAL MEDICINE

## 2025-07-25 PROCEDURE — 1160F RVW MEDS BY RX/DR IN RCRD: CPT | Performed by: INTERNAL MEDICINE

## 2025-07-25 PROCEDURE — 3008F BODY MASS INDEX DOCD: CPT | Performed by: INTERNAL MEDICINE

## 2025-07-25 PROCEDURE — 1159F MED LIST DOCD IN RCRD: CPT | Performed by: INTERNAL MEDICINE

## 2025-07-25 PROCEDURE — 3078F DIAST BP <80 MM HG: CPT | Performed by: INTERNAL MEDICINE

## 2025-07-25 PROCEDURE — 99213 OFFICE O/P EST LOW 20 MIN: CPT | Performed by: INTERNAL MEDICINE

## 2025-07-25 PROCEDURE — G0439 PPPS, SUBSEQ VISIT: HCPCS | Performed by: INTERNAL MEDICINE

## 2025-07-25 PROCEDURE — 3074F SYST BP LT 130 MM HG: CPT | Performed by: INTERNAL MEDICINE

## 2025-07-25 PROCEDURE — 1036F TOBACCO NON-USER: CPT | Performed by: INTERNAL MEDICINE

## 2025-07-25 RX ORDER — LISINOPRIL 10 MG/1
10 TABLET ORAL DAILY
Qty: 90 TABLET | Refills: 1 | Status: SHIPPED | OUTPATIENT
Start: 2025-07-25

## 2025-07-25 RX ORDER — TORSEMIDE 20 MG/1
20 TABLET ORAL DAILY
Qty: 90 TABLET | Refills: 1 | Status: SHIPPED | OUTPATIENT
Start: 2025-07-25 | End: 2025-09-23

## 2025-07-25 ASSESSMENT — PATIENT HEALTH QUESTIONNAIRE - PHQ9
2. FEELING DOWN, DEPRESSED OR HOPELESS: NOT AT ALL
1. LITTLE INTEREST OR PLEASURE IN DOING THINGS: NOT AT ALL
SUM OF ALL RESPONSES TO PHQ9 QUESTIONS 1 AND 2: 0
SUM OF ALL RESPONSES TO PHQ9 QUESTIONS 1 AND 2: 0
2. FEELING DOWN, DEPRESSED OR HOPELESS: NOT AT ALL
1. LITTLE INTEREST OR PLEASURE IN DOING THINGS: NOT AT ALL

## 2025-07-25 ASSESSMENT — ACTIVITIES OF DAILY LIVING (ADL)
DOING_HOUSEWORK: INDEPENDENT
MANAGING_FINANCES: INDEPENDENT
BATHING: INDEPENDENT
DRESSING: INDEPENDENT
TAKING_MEDICATION: INDEPENDENT
GROCERY_SHOPPING: INDEPENDENT

## 2025-07-25 NOTE — PROGRESS NOTES
Jacquelyn Buenrostro   69 y.o.    @@  N/Room: [id number]/Room/bed info not found    Subjective:   The patient is being seen for a routine clinic follow-up of chronic kidney disease. Recently, the disease has been stable. Disease complications:  No hyperkalemia, no hypocalcemia, no hyperphosphatemia, no metabolic acidosis, no coagulopathy, no uremic encephalopathy, no neuropathy and no renal osteodystrophy. The patient is currently asymptomatic. No associated symptoms are reported.       Meds:   Current Medications[1]       ROS:  The patient is awake and oriented. No dizziness or lightheadedness. No chills and no fever. No headaches. No nausea and no vomiting. No shortness of breath. No cough. No chest pain. No abdominal pain. No diarrhea. No hematemesis or hemoptysis. No hematuria. No rectal bleeding. No melena. No epistaxis. No urinary symptoms. No flank pain. No leg edema. No itching. Overall, the rest of the review of systems is also negative.  12 point review of systems otherwise negative as stated in HPI.        Physical Examination:        Vitals:    07/25/25 1115   BP: 119/78   Pulse: 77     General: The patient is awake, oriented, and is not in any distress.  Head and Neck: Normocephalic. No periorbital edema.  Eyes: non-icteric  Respiratory: Symmetric chest expansion. No respiratory distress.  Skin: No maculopapular rash.  Musculoskeletal: No peripheral edema.  Neuro Exam: Speech is fluent. Moves extremities.    Imaging:  === 02/15/24 ===    US RENAL COMPLETE    - Impression -  Mildly increased bilateral parenchymal echogenicity of the kidneys  which may indicate medical renal disease.    No significant hydronephrosis.    Signed by: Nicolas Ferreira 2/16/2024 4:02 PM  Dictation workstation:   [id number]       Blood Labs:  No results found for this or any previous visit (from the past 24 hours).   Lab Results   Component Value Date    PTH 56 07/16/2025    PHOS 3.8 09/03/2022      Lab Results   Component Value  Date    GLUCOSE 90 07/16/2025    CALCIUM 9.5 07/16/2025     07/16/2025    K 5.1 07/16/2025    CO2 27 07/16/2025     07/16/2025    BUN 29 (H) 07/16/2025    CREATININE 1.13 (H) 07/16/2025             Assessment and Plan:  1- CKD III: Last Cr level is 1.13.  Stable kidney function. Normal potassium and bicarb level.      2.  Hypertension.  Blood pressure is under control.  Continue the current medications.     3.  Secondary hyperparathyroidism.  On calcitriol with good PTH level.     I will see her in about 6 month for follow-up.             Benito Castillo MD  Senior Attending Physician  Director of Onco-Nephrology Program  Division of Nephrology & Hypertension  [facility]       [1]   Current Outpatient Medications   Medication Sig Dispense Refill    albuterol 90 mcg/actuation inhaler Inhale 2 puffs every 6 hours if needed for wheezing. 18 g 11    amLODIPine (Norvasc) 5 mg tablet TAKE ONE TABLET BY MOUTH DAILY AT 9AM 90 tablet 1    budesonide-formoterol (Symbicort) 80-4.5 mcg/actuation inhaler Inhale 2 puffs 2 times a day. Rinse mouth with water after use to reduce aftertaste and incidence of candidiasis. Do not swallow. 10.2 g 11    calcitriol (Rocaltrol) 0.25 mcg capsule Take 1 capsule (0.25 mcg) by mouth every other day. 45 capsule 3    carvedilol (Coreg) 25 mg tablet TAKE 1 TABLET BY MOUTH TWICE A DAY 60 tablet 11    lisinopril 10 mg tablet Take 1 tablet (10 mg) by mouth once daily. 90 tablet 1    melatonin 10 mg tablet Take 1 tablet (10 mg) by mouth once daily.      methotrexate (Trexall) 2.5 mg tablet Take 8 tablets (20 mg total) by mouth 1 (one) time per week.  Follow directions carefully, and ask to explain any part you do not understand. Take exactly as directed. 12 tablet 0    multivitamin-min-FA-ginkgo (One Daily Women 50 Plus) 400-120 mcg-mg tablet Take 1 tablet by mouth once daily.      prednisoLONE (Sterane) 5 mg tablet tablet Take 1 tablet (5 mg) by mouth  once daily. 90 tablet 3    simvastatin (Zocor) 20 mg tablet Take 1 tablet (20 mg) by mouth once daily. 30 tablet 1    torsemide (Demadex) 20 mg tablet Take 1 tablet (20 mg) by mouth once daily. 90 tablet 1    vit C/E/zinc/lutein/zeaxanthin (OCUVITE EYE HEALTH ORAL) Take 1 tablet by mouth once daily.       No current facility-administered medications for this visit.

## 2025-07-25 NOTE — ASSESSMENT & PLAN NOTE
Orders:    CBC and Auto Differential; Future    Iron and TIBC; Future    Ferritin; Future    Vitamin B12; Future    Folate; Future

## 2025-07-25 NOTE — ASSESSMENT & PLAN NOTE
Hx and PE as mentioned  MDD screening negative  Pt had the accidental fall during the winter season with no injuries  Screening mammogram was ordered  Cologuard for the colon CA screening was ordered  Pt was counseled about the age appropriate vaccination  AWV in 1 year

## 2025-07-25 NOTE — ASSESSMENT & PLAN NOTE
Orders:    CBC and Auto Differential; Future    Comprehensive Metabolic Panel; Future    TSH with reflex to Free T4 if abnormal; Future    Lipid panel; Future    Hemoglobin A1c; Future

## 2025-07-25 NOTE — ASSESSMENT & PLAN NOTE
The discussion about the ACP was held. Pt states that she has the living will, but did not bring the copy of the paperwork for the visit

## 2025-07-25 NOTE — ASSESSMENT & PLAN NOTE
Orders:    lisinopril 10 mg tablet; Take 1 tablet (10 mg) by mouth once daily.    CBC and Auto Differential; Future    Comprehensive Metabolic Panel; Future    TSH with reflex to Free T4 if abnormal; Future    Lipid panel; Future    Hemoglobin A1c; Future    Protein, Urine Random; Future    Albumin-Creatinine Ratio, Urine Random; Future

## 2025-07-25 NOTE — PROGRESS NOTES
Subjective   Reason for Visit: Jacquelyn Buenrostro is an 69 y.o. female here for a Medicare Wellness visit.     Past Medical, Surgical, and Family History reviewed and updated in chart.    Reviewed all medications by prescribing practitioner or clinical pharmacist (such as prescriptions, OTCs, herbal therapies and supplements) and documented in the medical record.    HPIPt is a pleasant 68 yo CF with extensive PMHX who was seen and evaluated during the medicare AWV. Pt states that overall she feels ok. Pt states that she started to walk and has the plan for the trip in a couple weeks. Pt also requested the refill on the several medications. During the clinical encounter pt denies fever, chills, no SOB, no chest pain/tightness, no abdominal pain, no N/V/D reported, no change of urination reported. Pt denies any other health concerns during this visit.  Sohx: non smoker  List of the medications and allergies: reviewed  Fhx and PMHx: reviewed      Patient Care Team:  Kaye Grey MD as PCP - General (Internal Medicine)  Kaye Grey MD as PCP - [employer]age PCP  Brian Quijano MD as Cardiologist (Electrophysiology)  Benito Castillo MD as Nephrologist (Nephrology)  Yazan Lopez MD as Vascular (Vascular Medicine)     Review of Systems  The systems have been reviewed as follows:   Constitutional: no fever, no chills  Eyes: no eyesight problems, no eye redness, no eye pain, no blurred vision  ENT: no ear pain or sore throat, no nasal discharge or congestion, no sinus pressure, no hearing loss  Cardiovascular: no chest pain or tightness,  the heart rate was not fast or slow  Respiratory: no cough, no dyspnea with exertion or with rest, no wheezing  Gastrointestinal: no abdominal pain, no constipation or diarrhea, no heartburn, no nausea or vomiting  Genitourinary: no urine frequency, no dysuria, no urinary urgency, no urinary incontinence or retention  Musculoskeletal: no back pain, no  "arthralgia, no joint swelling or stiffness, no muscle weakness  Integumentary: no skin lesions or rash  Neurological: no headaches, no dizziness or fainting, no limb weakness  Psychiatric: no mood changes, no anxiety or depression, no SI/HI  Endocrine: no weight change, no temperature intolerance, no change of appetite  Hematologic/Lymphatic: no easy bruising or bleeding    Objective   Vitals:  /78 (BP Location: Right arm, Patient Position: Sitting)   Pulse 77   Temp 36.2 °C (97.2 °F)   Ht 1.651 m (5' 5\")   Wt 106 kg (234 lb 3.2 oz)   BMI 38.97 kg/m²       Physical Exam  Constitutional: well hydrated, well nourished, no acute distress. Vital signs reviewed  Head and face: normocephalic, atraumatic  Eyes: no erythema, edema or visible abnormalities of conjunctiva or lids. Pupils are equal, round and reactive to light. Extraocular movement normal  ENT: external ears without deformities, external ear canals without redness, swelling or tenderness. TMs normal color, normal landmarks, no fluid, non-retracted  Neck: full ROM, no adenopathy, neck was supple, thyroid gland not enlarged, no palpable nodules  Pulmonary: normal respiratory rate and effort. Lungs are clear to auscultation, no rales,no rhonchi or wheezing  Cardiovascular: RRR, normal S1 and S2, no murmur, no gallop, posterior tib. pulse normal 2+ bilaterally, trace ankle edema  Abdomen: soft, non tender on palpation, not distended, normal BS in all 4 quadrants, no CVA tenderness  Musculoskeletal: no joint swelling, normal movements of all 4 extremities. Gait and station: normal, Digits and nails: normal without clubbing  Skin: normal color, no rash  Neurologic: Cranial nerves: CN II: visual acuity intact. Source: acuity reported by patient. Pupils reactive to light with normal accommodation. Right and left pupil normal CN III, IV and VI: the EO movements were intact. CN VIII: no hearing loss. Deep tendon reflexes: were 2+ and symmetric: R and L " patella.  Sensory exam: normal to light touch  Psychiatric: Mood and affect: normal, Alert and Oriented x 3  Lymphatic: no cervical lymphadenopathy  Assessment & Plan  Stage C diastolic heart failure    Orders:    torsemide (Demadex) 20 mg tablet; Take 1 tablet (20 mg) by mouth once daily.  Pt FU with the cardiology team on the regular basis  Cardiac sarcoidosis    Orders:    torsemide (Demadex) 20 mg tablet; Take 1 tablet (20 mg) by mouth once daily.    Medicare annual wellness visit, subsequent       Hx and PE as mentioned  MDD screening negative  Pt had the accidental fall during the winter season with no injuries  Screening mammogram was ordered  Cologuard for the colon CA screening was ordered  Pt was counseled about the age appropriate vaccination  AWV in 1 year  Depression screening negative         Personal history of fall         Vaccine counseling         Advanced care planning/counseling discussion       The discussion about the ACP was held. Pt states that she has the living will, but did not bring the copy of the paperwork for the visit  Screening for obesity         Class 2 severe obesity due to excess calories with serious comorbidity and body mass index (BMI) of 38.0 to 38.9 in adult    Orders:    CBC and Auto Differential; Future    Comprehensive Metabolic Panel; Future    TSH with reflex to Free T4 if abnormal; Future    Lipid panel; Future    Hemoglobin A1c; Future    HTN (hypertension), benign    Orders:    lisinopril 10 mg tablet; Take 1 tablet (10 mg) by mouth once daily.    CBC and Auto Differential; Future    Comprehensive Metabolic Panel; Future    TSH with reflex to Free T4 if abnormal; Future    Lipid panel; Future    Hemoglobin A1c; Future    Protein, Urine Random; Future    Albumin-Creatinine Ratio, Urine Random; Future    Breast cancer screening by mammogram    Orders:    BI mammo bilateral screening tomosynthesis; Future    Encounter for screening for malignant neoplasm of  colon    Orders:    Cologuard® colon cancer screening; Future    Anemia, unspecified type    Orders:    CBC and Auto Differential; Future    Iron and TIBC; Future    Ferritin; Future    Vitamin B12; Future    Folate; Future    Screening for diabetes mellitus    Orders:    Hemoglobin A1c; Future       A comprehensive discussion regarding all presenting signs and symptoms was conducted with the patient. The patient demonstrated understanding of the diagnosed health condition and expressed agreement with the proposed clinical management plan as outlined above. The patient is advised to follow up with their primary care provider (PCP) as scheduled, or earlier if clinically indicated.

## 2025-07-25 NOTE — ASSESSMENT & PLAN NOTE
Orders:    torsemide (Demadex) 20 mg tablet; Take 1 tablet (20 mg) by mouth once daily.  Pt FU with the cardiology team on the regular basis

## 2025-07-27 DIAGNOSIS — Z00.00 ROUTINE GENERAL MEDICAL EXAMINATION AT HEALTH CARE FACILITY: ICD-10-CM

## 2025-07-29 RX ORDER — AMLODIPINE BESYLATE 5 MG/1
TABLET ORAL
Qty: 90 TABLET | Refills: 1 | Status: SHIPPED | OUTPATIENT
Start: 2025-07-29

## 2025-08-03 LAB — NONINV COLON CA DNA+OCC BLD SCRN STL QL: NEGATIVE

## 2025-08-04 ENCOUNTER — RESULTS FOLLOW-UP (OUTPATIENT)
Dept: PRIMARY CARE | Facility: CLINIC | Age: 70
End: 2025-08-04
Payer: MEDICARE

## 2025-08-04 NOTE — TELEPHONE ENCOUNTER
----- Message from Kaye Grey sent at 8/4/2025  8:15 AM EDT -----  Clooguard test was negative for the signs of colon cancer. Next Cologuard test in 3 years.  ----- Message -----  From: Anum Song Results In  Sent: 8/3/2025  10:36 PM EDT  To: Kaye Grey MD

## 2025-08-06 ENCOUNTER — APPOINTMENT (OUTPATIENT)
Facility: CLINIC | Age: 70
End: 2025-08-06
Payer: MEDICARE

## 2025-08-11 DIAGNOSIS — I50.30 STAGE C DIASTOLIC HEART FAILURE: ICD-10-CM

## 2025-08-11 DIAGNOSIS — D86.85 CARDIAC SARCOIDOSIS: ICD-10-CM

## 2025-08-11 RX ORDER — SIMVASTATIN 20 MG/1
TABLET, FILM COATED ORAL
Qty: 30 TABLET | Refills: 1 | Status: SHIPPED | OUTPATIENT
Start: 2025-08-11

## 2025-08-13 ENCOUNTER — HOSPITAL ENCOUNTER (OUTPATIENT)
Dept: CARDIOLOGY | Facility: HOSPITAL | Age: 70
Discharge: HOME | End: 2025-08-13
Payer: MEDICARE

## 2025-08-13 DIAGNOSIS — Z95.0 PACEMAKER: ICD-10-CM

## 2025-08-13 DIAGNOSIS — I44.2 ATRIOVENTRICULAR BLOCK, COMPLETE (MULTI): ICD-10-CM

## 2025-08-13 PROCEDURE — 93296 REM INTERROG EVL PM/IDS: CPT

## 2025-08-30 DIAGNOSIS — E21.3 HYPERPARATHYROIDISM (MULTI): ICD-10-CM

## 2025-08-30 DIAGNOSIS — I10 ESSENTIAL HYPERTENSION: ICD-10-CM

## 2025-08-30 DIAGNOSIS — N18.32 CKD STAGE G3B/A2, GFR 30-44 AND ALBUMIN CREATININE RATIO 30-299 MG/G (MULTI): ICD-10-CM

## 2025-09-04 RX ORDER — CALCITRIOL 0.25 UG/1
0.25 CAPSULE ORAL EVERY OTHER DAY
Qty: 45 CAPSULE | Refills: 3 | Status: SHIPPED | OUTPATIENT
Start: 2025-09-04

## 2025-09-10 ENCOUNTER — APPOINTMENT (OUTPATIENT)
Facility: CLINIC | Age: 70
End: 2025-09-10
Payer: MEDICARE

## 2025-09-19 ENCOUNTER — APPOINTMENT (OUTPATIENT)
Dept: OPHTHALMOLOGY | Facility: CLINIC | Age: 70
End: 2025-09-19
Payer: MEDICARE

## 2025-10-13 ENCOUNTER — APPOINTMENT (OUTPATIENT)
Dept: CARDIOLOGY | Facility: CLINIC | Age: 70
End: 2025-10-13
Payer: MEDICARE

## 2025-10-20 ENCOUNTER — APPOINTMENT (OUTPATIENT)
Dept: CARDIOLOGY | Facility: CLINIC | Age: 70
End: 2025-10-20
Payer: MEDICARE

## 2025-10-23 ENCOUNTER — APPOINTMENT (OUTPATIENT)
Dept: CARDIOLOGY | Facility: CLINIC | Age: 70
End: 2025-10-23
Payer: MEDICARE

## 2025-11-05 ENCOUNTER — APPOINTMENT (OUTPATIENT)
Dept: OPHTHALMOLOGY | Facility: CLINIC | Age: 70
End: 2025-11-05
Payer: MEDICARE

## 2025-11-17 ENCOUNTER — APPOINTMENT (OUTPATIENT)
Facility: CLINIC | Age: 70
End: 2025-11-17
Payer: MEDICARE

## 2026-01-23 ENCOUNTER — APPOINTMENT (OUTPATIENT)
Dept: NEPHROLOGY | Facility: CLINIC | Age: 71
End: 2026-01-23
Payer: MEDICARE

## 2026-01-23 ENCOUNTER — APPOINTMENT (OUTPATIENT)
Dept: PRIMARY CARE | Facility: CLINIC | Age: 71
End: 2026-01-23
Payer: MEDICARE

## (undated) DEVICE — CANNULA, ANTERIOR CHAMBER, 27 GA

## (undated) DEVICE — DRAPE, UTILITY, INSTRUMENT PANEL, 46CM X 56CM (18X22"

## (undated) DEVICE — KNIFE, CLEARCUT HP2, DUAL BEVEL, SLIT, 2.4MM ANGLED

## (undated) DEVICE — GOWN, ASTOUND, XL

## (undated) DEVICE — CANNULA, CHANG HYDRO- DISSECT, 27G, FLAT TIP, DISP

## (undated) DEVICE — Device